# Patient Record
Sex: FEMALE | Race: WHITE | Employment: UNEMPLOYED | URBAN - METROPOLITAN AREA
[De-identification: names, ages, dates, MRNs, and addresses within clinical notes are randomized per-mention and may not be internally consistent; named-entity substitution may affect disease eponyms.]

---

## 2017-08-04 ENCOUNTER — TRANSFERRED RECORDS (OUTPATIENT)
Dept: HEALTH INFORMATION MANAGEMENT | Facility: CLINIC | Age: 37
End: 2017-08-04

## 2017-08-06 ENCOUNTER — TRANSFERRED RECORDS (OUTPATIENT)
Dept: HEALTH INFORMATION MANAGEMENT | Facility: CLINIC | Age: 37
End: 2017-08-06

## 2017-08-10 ENCOUNTER — TRANSFERRED RECORDS (OUTPATIENT)
Dept: HEALTH INFORMATION MANAGEMENT | Facility: CLINIC | Age: 37
End: 2017-08-10

## 2017-08-12 ENCOUNTER — TRANSFERRED RECORDS (OUTPATIENT)
Dept: HEALTH INFORMATION MANAGEMENT | Facility: CLINIC | Age: 37
End: 2017-08-12

## 2017-08-16 ENCOUNTER — TRANSFERRED RECORDS (OUTPATIENT)
Dept: HEALTH INFORMATION MANAGEMENT | Facility: CLINIC | Age: 37
End: 2017-08-16

## 2017-09-22 ENCOUNTER — TRANSFERRED RECORDS (OUTPATIENT)
Dept: HEALTH INFORMATION MANAGEMENT | Facility: CLINIC | Age: 37
End: 2017-09-22

## 2017-11-07 ENCOUNTER — CARE COORDINATION (OUTPATIENT)
Dept: SURGERY | Facility: CLINIC | Age: 37
End: 2017-11-07

## 2017-11-07 NOTE — PROGRESS NOTES
Called and left message for patient. We do not have any records, we should reschedule her appointment.     Writer did call:     Dr. Zoraida Medina MD  Doctor in [place], [place]  Address: [address], [place] [address]  Phone: [phone]    They are sending records today however they cannot send images electronically over to us. They have to mail a CD of the images.     If patient would like to hand carry her records, she can also do this. We need to have her call back to discuss.

## 2017-11-08 ENCOUNTER — OFFICE VISIT (OUTPATIENT)
Dept: SURGERY | Facility: CLINIC | Age: 37
End: 2017-11-08

## 2017-11-08 VITALS
DIASTOLIC BLOOD PRESSURE: 79 MMHG | WEIGHT: 289.5 LBS | OXYGEN SATURATION: 97 % | TEMPERATURE: 98.5 F | SYSTOLIC BLOOD PRESSURE: 134 MMHG | HEIGHT: 62 IN | BODY MASS INDEX: 53.27 KG/M2 | HEART RATE: 76 BPM

## 2017-11-08 DIAGNOSIS — K43.2 INCISIONAL HERNIA, WITHOUT OBSTRUCTION OR GANGRENE: Primary | ICD-10-CM

## 2017-11-08 RX ORDER — UBIDECARENONE 100 MG
3000 CAPSULE ORAL EVERY MORNING
COMMUNITY
Start: 2016-10-10

## 2017-11-08 RX ORDER — FLUOXETINE 40 MG/1
40 CAPSULE ORAL EVERY MORNING
COMMUNITY
Start: 2017-10-10 | End: 2020-05-19

## 2017-11-08 RX ORDER — ACETAMINOPHEN 325 MG/1
650 TABLET ORAL
COMMUNITY
Start: 2017-08-22 | End: 2018-07-10

## 2017-11-08 RX ORDER — CHOLECALCIFEROL (VITAMIN D3) 50 MCG
TABLET ORAL
COMMUNITY
Start: 2016-10-10 | End: 2018-07-10

## 2017-11-08 RX ORDER — HYDROXYZINE HYDROCHLORIDE 50 MG/1
50-100 TABLET, FILM COATED ORAL PRN
COMMUNITY

## 2017-11-08 RX ORDER — LEVOTHYROXINE SODIUM 75 UG/1
75 TABLET ORAL EVERY MORNING
COMMUNITY
Start: 2017-10-19

## 2017-11-08 ASSESSMENT — PAIN SCALES - GENERAL: PAINLEVEL: SEVERE PAIN (6)

## 2017-11-08 NOTE — NURSING NOTE
"Chief Complaint   Patient presents with     Consult     Abdominal wall hernia, appt per Cherelle. Dr. Doss referring. BMI 53 CT scan from [facility]       Vitals:    11/08/17 1104   BP: 134/79   Pulse: 76   Temp: 98.5  F (36.9  C)   TempSrc: Oral   SpO2: 97%   Weight: 289 lb 8 oz   Height: 5' 2.21\"       Body mass index is 52.6 kg/(m^2).    Bee Manzano CMA                       "

## 2017-11-08 NOTE — PROGRESS NOTES
"MIS    RE: Brianna Brewer  MR#: [id number]  : 1980      Referring provider: Dr. Doss    Chief Complaint/Reason for visit: Abdominal ventral wall hernia      HISTORY OF PRESENT ILLNESS:  Brianna is a 35 yo female with a hx of obesity (BMI 53), hypothyroidism, and anxiety who presents with abdominal ventral wall hernia after caesarian section (Aug 6). Her  has been complicated by wound dehiscence, recurrent c.diff, hematochezia, and now ventral wall hernia that was diagnosed on CT.     She reports persistent LLQ abdominal pain that is a 5-6 out of 10 at baseline. At its worst, the pain is a 10 out of 10. The pain feels like stabbing and is worst during activity that increase her abdominal pressure such as coughing and holding her baby. She says that she can feel something \"sliding in an out.\" This pain has caused her to have decreased appetite and interrupted sleep. She has intermittent nausea and vomiting. No longer endorses hematochezia. Soft stools, not watery. She was taking oxycodone for her pain but ran out of the medication. Now, she takes 2 Aleve pills a day which provide some, but not complete relief.     Additionally, she complains of RUQ pain that started during 7 mos of pregnancy and has become more persistent for the past few weeks. This also feels like a stabbing with radiation to her right back. She reports that the pain may correlate with eating but she is unsure.     PAST MEDICAL HISTORY:  Obesity BMI 53  Hypothyroidism  Anxiety    PAST SURGICAL HISTORY:   (Aug 6, 2017)    FAMILY HISTORY:   Obesity on both sides of family    SOCIAL HISTORY:   From [place], [place]    ROS:  10 point ROS negative except for what is mentioned in HPI.    MEDICATIONS:  Current Outpatient Prescriptions   Medication     Cholecalciferol (D3-1000) 1000 UNITS CAPS     FLUoxetine (PROZAC) 40 MG capsule     levothyroxine (SYNTHROID/LEVOTHROID) 75 MCG tablet     Prenatal MV-Min-Fe Fum-FA-DHA " "(PRENATAL MULTIVITAMIN + DHA) 28-0.8 & 200 MG MISC     acetaminophen (TYLENOL) 325 MG tablet     hydrOXYzine (ATARAX) 50 MG tablet     No current facility-administered medications for this visit.        ALLERGIES: No known allergies    LABS/IMAGING/MEDICAL RECORDS REVIEW:   CT Abdomen from OS ([facility]) - 10/26/17  Large ventral abdominal hernia similar to the 10/02/2017 exam. No interval acute inflammatory change within the abdomen or pelvis. No bowel obstruction.      CT Abdomen from OS ([facility]) - 10/2/17  Infraumbilical ventral herniation without evidence of incarceration. There are however inflammatory changes about the hernia sac extending caudally with potential fistula to the skin.    PHYSICAL EXAM:  /79  Pulse 76  Temp 98.5  F (36.9  C) (Oral)  Ht 1.58 m (5' 2.21\")  Wt 131.3 kg (289 lb 8 oz)  SpO2 97%  BMI 52.6 kg/m2    General: Alert and oriented, NAD  HEENT: no jaundice, no scleral icterus  CV: regular rate and rhythm  Resp: CTAB  Abdomen: soft, obese, mildly tender to palpation chary in LLQ, no masses felt, RUQ tenderness +Pillai's  Extrem: warm, no edema or obvious joint deformities    Assessment & Plan:  Brianna is a 35 yo female with a hx of obesity (BMI 53), hypothyroidism, and recent C. Diff who presents with abdominal ventral wall hernia after caesarian section (Aug 6).    - Recommend 50 lb weight loss prior to surgical intervention. Will schedule appt with [facility] NPs to discuss pharmaceutical weight loss options  - F/u in MIS clinic in 1 month  - Meet with plastic surgery outpt in 1 month    BONIFACIO De Leon      Patient seen and examined by me. The above noted by the medical student much appreciated and reflects her capacity as described in my seeing this patient. The patient understands the need for significant weight loss and we will have her see plastic surgery as we approached this from a multidisciplinary perspective. The patient understands signs symptoms of " incarceration and strangulation and will see us if necessary earlier. I will see her in 1 month.

## 2017-11-08 NOTE — MR AVS SNAPSHOT
After Visit Summary   11/8/2017    Brianna Brewer    MRN: [id number]           Patient Information     Date Of Birth          1980        Visit Information        Provider Department      11/8/2017 12:00 PM Gwyn Finch MD [facility] Surgery        Care Instructions    It was a pleasure meeting with you today.     Thank you for allowing us the privilege of caring for you. We hope we provided you with the excellent service you deserve.     Please let us know if there is anything else we can do for you so that we can be sure you are leaving completely satisfied with your care experience.      You saw Dr Finch today.     Instructions per today's visit:     Plan to lose 50 lbs from today's weight of 289 lbs. Goal weight is 249 lbs.     This may help with your symptoms.     Please take Ibuprofen for pain. Take stool softeners to help with constipation and discomfort.    Please call to set up with Medical weight management team to assist with weight loss with medications: [phone]- please ask for non-surgical weight management.     Please see the Plastic surgery team to discuss pulling your abdominal wall.    Please see both the plastic surgery team and Dr. Finch on the same day.       **If you have emergent symptoms, please call the hospital at [phone] and ask for the on-call surgeon, or present to the hospital. **    To schedule appointments with our team, please call [phone] option #1    Please call during clinic hours Monday through Friday 8:00a - 4:00p if you have questions or you can contact us via X[employer]ilon at anytime.      Nurses: [phone] Option # 3 for nurse advice line.  Fax: [phone]  Surgery Scheduler: [phone]    Please call the hospital at [phone] to speak with our on call MDs if you have urgent needs after hours, during weekends, or holidays.              Follow-ups after your visit        Who to contact     Please call your  "clinic at [phone] to:    Ask questions about your health    Make or cancel appointments    Discuss your medicines    Learn about your test results    Speak to your doctor   If you have compliments or concerns about an experience at your clinic, or if you wish to file a complaint, please contact [facility] Patient Relations at [phone] or email us at [email or web address]         Additional Information About Your Visit        MyChart Information     [employer] gives you secure access to your electronic health record. If you see a primary care provider, you can also send messages to your care team and make appointments. If you have questions, please call your primary care clinic.  If you do not have a primary care provider, please call [phone] and they will assist you.      Talkbits is an electronic gateway that provides easy, online access to your medical records. With [employer]bits, you can request a clinic appointment, read your test results, renew a prescription or communicate with your care team.     To access your existing account, please contact your [facility] Clinic or call [phone] for assistance.        Care EveryWhere ID     This is your Care EveryWhere ID. This could be used by other organizations to access your [facility] medical records  [id number]        Your Vitals Were     Pulse Temperature Height Pulse Oximetry BMI (Body Mass Index)       76 98.5  F (36.9  C) (Oral) 1.58 m (5' 2.21\") 97% 52.6 kg/m2        Blood Pressure from Last 3 Encounters:   11/08/17 134/79    Weight from Last 3 Encounters:   11/08/17 131.3 kg (289 lb 8 oz)              Today, you had the following     No orders found for display       Primary Care Provider Office Phone # Fax #    Yaya Kramer [phone]       [address] [address]        Equal Access to Services     JANICE SRIVASTAVA: Nabila watts " leonila Zuñiga, julian colindresangelicaha, kwabena jcnicholas hernestoalesha, cydney mirthain hayaan hernestoaidan shreyateresita laskyeludmila aidee. So [facility] [phone].    ATENCIÓN: Si habla español, tiene a parish disposición servicios gratuitos de asistencia lingüística. Des al [phone].    We comply with applicable federal civil rights laws and [place] laws. We do not discriminate on the basis of race, color, national origin, age, disability, sex, sexual orientation, or gender identity.            Thank you!     Thank you for choosing [facility]  for your care. Our goal is always to provide you with excellent care. Hearing back from our patients is one way we can continue to improve our services. Please take a few minutes to complete the written survey that you may receive in the mail after your visit with us. Thank you!             Your Updated Medication List - Protect others around you: Learn how to safely use, store and throw away your medicines at www.disposemymeds.org.          This list is accurate as of: 11/8/17  1:12 PM.  Always use your most recent med list.                   Brand Name Dispense Instructions for use Diagnosis    acetaminophen 325 MG tablet    TYLENOL     Take 650 mg by mouth        D3-1000 1000 UNITS Caps           FLUoxetine 40 MG capsule    PROzac          hydrOXYzine 50 MG tablet    ATARAX     Take  mg by mouth        levothyroxine 75 MCG tablet    SYNTHROID/LEVOTHROID          PRENATAL MULTIVITAMIN + DHA 28-0.8 & 200 MG Misc

## 2017-11-08 NOTE — LETTER
"2017       RE: Brianna Brewer  [address] [place] [id number]     Dear Colleague,    Thank you for referring your patient, Brianna Brewer, to [facility] Please see a copy of my visit note below.    MIS    RE: Brianna Brewer  MR#: [id number]  : 1980      Referring provider: Dr. Doss    Chief Complaint/Reason for visit: Abdominal ventral wall hernia      HISTORY OF PRESENT ILLNESS:  Brianna is a 37 yo female with a hx of obesity (BMI 53), hypothyroidism, and anxiety who presents with abdominal ventral wall hernia after caesarian section (Aug 6). Her  has been complicated by wound dehiscence, recurrent c.diff, hematochezia, and now ventral wall hernia that was diagnosed on CT.     She reports persistent LLQ abdominal pain that is a 5-6 out of 10 at baseline. At its worst, the pain is a 10 out of 10. The pain feels like stabbing and is worst during activity that increase her abdominal pressure such as coughing and holding her baby. She says that she can feel something \"sliding in an out.\" This pain has caused her to have decreased appetite and interrupted sleep. She has intermittent nausea and vomiting. No longer endorses hematochezia. Soft stools, not watery. She was taking oxycodone for her pain but ran out of the medication. Now, she takes 2 Aleve pills a day which provide some, but not complete relief.     Additionally, she complains of RUQ pain that started during 7 mos of pregnancy and has become more persistent for the past few weeks. This also feels like a stabbing with radiation to her right back. She reports that the pain may correlate with eating but she is unsure.     PAST MEDICAL HISTORY:  Obesity BMI 53  Hypothyroidism  Anxiety    PAST SURGICAL HISTORY:   (Aug 6, 2017)    FAMILY HISTORY:   Obesity on both sides of family    SOCIAL HISTORY:   From [place], [place]    ROS:  10 " "point ROS negative except for what is mentioned in HPI.    MEDICATIONS:  Current Outpatient Prescriptions   Medication     Cholecalciferol (D3-1000) 1000 UNITS CAPS     FLUoxetine (PROZAC) 40 MG capsule     levothyroxine (SYNTHROID/LEVOTHROID) 75 MCG tablet     Prenatal MV-Min-Fe Fum-FA-DHA (PRENATAL MULTIVITAMIN + DHA) 28-0.8 & 200 MG MISC     acetaminophen (TYLENOL) 325 MG tablet     hydrOXYzine (ATARAX) 50 MG tablet     No current facility-administered medications for this visit.        ALLERGIES: No known allergies    LABS/IMAGING/MEDICAL RECORDS REVIEW:   CT Abdomen from OS ([facility]) - 10/26/17  Large ventral abdominal hernia similar to the 10/02/2017 exam. No interval acute inflammatory change within the abdomen or pelvis. No bowel obstruction.      CT Abdomen from OS ([facility]) - 10/2/17  Infraumbilical ventral herniation without evidence of incarceration. There are however inflammatory changes about the hernia sac extending caudally with potential fistula to the skin.    PHYSICAL EXAM:  /79  Pulse 76  Temp 98.5  F (36.9  C) (Oral)  Ht 1.58 m (5' 2.21\")  Wt 131.3 kg (289 lb 8 oz)  SpO2 97%  BMI 52.6 kg/m2    General: Alert and oriented, NAD  HEENT: no jaundice, no scleral icterus  CV: regular rate and rhythm  Resp: CTAB  Abdomen: soft, obese, mildly tender to palpation chary in LLQ, no masses felt, RUQ tenderness +Pillai's  Extrem: warm, no edema or obvious joint deformities    Assessment & Plan:  Brianna is a 37 yo female with a hx of obesity (BMI 53), hypothyroidism, and recent C. Diff who presents with abdominal ventral wall hernia after caesarian section (Aug 6).    - Recommend 50 lb weight loss prior to surgical intervention. Will schedule appt with [facility] NPs to discuss pharmaceutical weight loss options  - F/u in MIS clinic in 1 month  - Meet with plastic surgery outpt in 1 month    Mi Gant MS3      Patient seen and examined by me. The above noted by the medical student " much appreciated and reflects her capacity as described in my seeing this patient. The patient understands the need for significant weight loss and we will have her see plastic surgery as we approached this from a multidisciplinary perspective. The patient understands signs symptoms of incarceration and strangulation and will see us if necessary earlier. I will see her in 1 month.      Again, thank you for allowing me to participate in the care of your patient.      Sincerely,    Gwyn Finch MD

## 2017-11-09 ASSESSMENT — ENCOUNTER SYMPTOMS
SEIZURES: 0
HEARTBURN: 1
CHILLS: 1
LOSS OF CONSCIOUSNESS: 0
TREMORS: 0
DIARRHEA: 1
NERVOUS/ANXIOUS: 1
ALTERED TEMPERATURE REGULATION: 1
SWOLLEN GLANDS: 0
SPEECH CHANGE: 0
DOUBLE VISION: 0
DIZZINESS: 1
POLYDIPSIA: 1
INSOMNIA: 1
POLYPHAGIA: 1
WEIGHT GAIN: 1
NAUSEA: 1
DEPRESSION: 1
EYE IRRITATION: 0
SKIN CHANGES: 0
NAIL CHANGES: 1
BLOATING: 1
BOWEL INCONTINENCE: 1
RECTAL PAIN: 1
TINGLING: 1
MEMORY LOSS: 0
EYE WATERING: 0
POOR WOUND HEALING: 0
CONSTIPATION: 1
NUMBNESS: 1
FATIGUE: 1
INCREASED ENERGY: 1
ABDOMINAL PAIN: 1
WEAKNESS: 1
EYE PAIN: 0
HEADACHES: 1
FEVER: 0
DECREASED APPETITE: 0
PARALYSIS: 0
DECREASED CONCENTRATION: 1
EYE REDNESS: 0
WEIGHT LOSS: 0
BRUISES/BLEEDS EASILY: 0
HALLUCINATIONS: 0
DISTURBANCES IN COORDINATION: 0
JAUNDICE: 0
BLOOD IN STOOL: 1
VOMITING: 1
NIGHT SWEATS: 1
PANIC: 1

## 2017-11-17 ENCOUNTER — ALLIED HEALTH/NURSE VISIT (OUTPATIENT)
Dept: SURGERY | Facility: CLINIC | Age: 37
End: 2017-11-17

## 2017-11-17 ENCOUNTER — OFFICE VISIT (OUTPATIENT)
Dept: ENDOCRINOLOGY | Facility: CLINIC | Age: 37
End: 2017-11-17

## 2017-11-17 VITALS
WEIGHT: 290.1 LBS | OXYGEN SATURATION: 98 % | BODY MASS INDEX: 53.39 KG/M2 | DIASTOLIC BLOOD PRESSURE: 77 MMHG | HEART RATE: 80 BPM | HEIGHT: 62 IN | SYSTOLIC BLOOD PRESSURE: 120 MMHG

## 2017-11-17 DIAGNOSIS — E66.01 MORBID OBESITY (H): Primary | ICD-10-CM

## 2017-11-17 RX ORDER — TOPIRAMATE 25 MG/1
TABLET, FILM COATED ORAL
Qty: 90 TABLET | Refills: 1 | Status: SHIPPED | OUTPATIENT
Start: 2017-11-17 | End: 2018-01-19

## 2017-11-17 RX ORDER — TOPIRAMATE 25 MG/1
TABLET, FILM COATED ORAL
Qty: 90 TABLET | Refills: 3 | Status: SHIPPED | OUTPATIENT
Start: 2017-11-17 | End: 2018-01-19
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
## 2017-11-17 NOTE — PROGRESS NOTES
"Brianna RebeccaAnetaKenny Brewer is a 36 year old female presents today for new weight management nutrition consultation.  Patient referred by Dr Huang(11/17/17).    Estimated body mass index is 52.7 kg/(m^2) as calculated from the following:    Height as of an earlier encounter on 11/17/17: 1.58 m (5' 2.21\").    Weight as of an earlier encounter on 11/17/17: 131.6 kg (290 lb 1.6 oz).     Goal to loose 50 pounds for surgery (goal 240 pounds)    Nutrition history  See MD note for details.  Weight watchers, atkins    B: Breakfast sandwich at [employer], cheese and pretzels  Chips or crackers  L: snacking throughout the day  D 5-6: mac and cheese, frozen pizza, chicken, cheese tacos, cereal(1% milk)  Popcorn or chips  Beverages: pop 8 cans per day on average or nestea, water, occ milk, rarely juice  Dislikes fruit and vegetables    Nutrition Prescription  Volumetric diet (per MD)    Nutrition Diagnosis  Food and nutrition related knowledge deficit r/t lack of prior exposure to volumetric diet and nutrition education aeb pt unable to verbalize understanding of volumetric diet for weight loss.    Nutrition Intervention  Materials/education provided on Volumetric eating to help satiety level on fewer calories; portion control and healthy food choices (Volumetrics handouts), 100 calorie snack choices, sources of protein, meal and snack planning and websites, sample meal plans    Patient Understanding: good  Expected Compliance: good  Follow-Up Plans: Physical activity     Nutrition Goals  1) Cut out pop, cut back by half each week  2) Add protein at three meals  3) Three full meals, reduce snacking during the day  4) Weight loss    Follow-Up:  PRN    Time spent with patient: 45 minutes.  Bernie Dalton, RD, LD        "

## 2017-11-17 NOTE — MR AVS SNAPSHOT
MRN:[id number]                      After Visit Summary   11/17/2017    Brianna Brewer    MRN: [id number]           Visit Information        Provider Department      11/17/2017 2:00 PM Bernie Dalton RD [facility]        Your next 10 appointments already scheduled     Nov 17, 2017  2:00 PM CST   (Arrive by 1:45 PM)   NUTRITION VISIT with Bernie Dalton RD   [facility] ([facility])    [address] [place] [id number] [phone]            Dec 13, 2017 10:00 AM CST   (Arrive by 9:45 AM)   New Plastic Surgery with MAGDIEL Katz MD   [facility] ([facility])    [address] [place] [id number] [phone]           Do not wear perfume.            Dec 13, 2017 11:00 AM CST   (Arrive by 10:45 AM)   NEW HERNIA SURGERY with Gwyn Finch MD   [facility] General Surgery ([facility])    [address] [place] [id number] [phone]           Do not wear perfume.              Care Instructions    Nutrition Goals  1) Cut out pop, cut back by half each week  2) Add protein at three meals  3) Three full meals, reduce snacking during the day  4) Weight loss    Bernie Dalton RD,     If you need to schedule or reschedule with a dietitian please call [phone].           [employer] Information     [employer] gives you secure access to your electronic health record. If you see a primary care provider, you can also send messages to your care team and make appointments. If you have questions, please call your primary care clinic.  If you do not have a primary care provider, please call [phone] and they will assist you.      [employer] is an electronic gateway that provides easy, online access to your medical records. With [employer], you can request a  clinic appointment, read your test results, renew a prescription or communicate with your care team.     To access your existing account, please contact your [facility] or call [phone] for assistance.        Care EveryWhere ID     This is your Care EveryWhere ID. This could be used by other organizations to access your [facility] medical records  [id number]        Equal Access to Services     JANICE HARDIN : Nabila Zuñiga, julian crockett, cydney tinsley. So [facility] [phone].    ATENCIÓN: Si habla español, tiene a parish disposición servicios gratuitos de asistencia lingüística. Llame al [phone].    We comply with applicable federal civil rights laws and [place] laws. We do not discriminate on the basis of race, color, national origin, age, disability, sex, sexual orientation, or gender identity.
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
## 2017-11-17 NOTE — NURSING NOTE
"Chief Complaint   Patient presents with     Weight Problem     NMWM       Vitals:    11/17/17 1123   BP: 120/77   Pulse: 80   SpO2: 98%   Weight: 290 lb 1.6 oz   Height: 5' 2.21\"       Body mass index is 52.7 kg/(m^2).    Bee Manzano CMA                       "

## 2017-11-17 NOTE — PATIENT INSTRUCTIONS
Dietitian visit for education.  Return in about 2-3 months with Dr. Huang or with Renetta Rossi      EDICATION STARTED AT THIS APPOINTMENT  We are starting topiramate at bedtime.  Start one tab, 25 mg, for a week. Go up to 50 mg (2 tabs) for the next week. At the third week, take   3 tabs (75 mg).  Stay at 3 tabs until you are seen again. Call the nurse at [phone] if you have any questions or concerns. (Do not stop taking it if you don't think it's working. For some people it works even though they do not feel much different.)    Topiramate (Topamax) is a medication that is used most often to treat migraine headaches or for seizures. It has also been found to help with weight loss. Although it's not currently FDA approved for weight loss, it has been used safely for a number of years to help people who are carrying extra weight.     Just how topiramate helps with weight loss has not been exactly determined. However it seems to work on areas of the brain to quiet down signals related to eating.      Topiramate may make you:    >feel less interest in eating in between meals   >think less about food and eating   >find it easier to push the plate away   >find giving up pop easier    >have an easier time eating less    For some of our patients, the pills work right away. They feel and think quite differently about food. Other patients don't feel much of a change but find in fact they have lost weight! Like all weight loss medications, topiramate works best when you help it work.  This means:    1) Have less tempting high calorie (fattening) food around the house or office    2) Have lower calorie food (fruits, vegetables,low fat meats and dairy) for snacks    3) Eat out only one time or less each week.   4) Eat your meals at a table with the TV or computer off.    Side-effects. Topiramate is generally well tolerated. The main side-effects we see are:   Tingling in hands,feet, or face (usually not very  troublesome)   Mental confusion and word finding trouble (about 10% of patients have this.)     Feeling sleepy or a bit dopey- this goes away very soon after starting.    One of the dangers of topiramate is the possibility of birth defects--if you get pregnant when you are on it, there is the risk that your baby will be born with a cleft lip or palate.  If you are on topiramate and of child bearing age, you need to be on a reliable form of birth control or refrain from sexual intercourse.     Please refer to the pharmacy insert for more information on side-effects. Since many pharmacists are not familiar with the use of topiramate in weight loss, calling the clinic will get you the most accurate information on the use of this medication for weight loss.     In order to get refills of this or any medication we prescribe you must be seen in the medical weight mgmt clinic every 2-3 months. Please have your pharmacy fax a refill request to [phone].

## 2017-11-17 NOTE — MR AVS SNAPSHOT
After Visit Summary   11/17/2017    Brianna Brewer    MRN: [id number]           Patient Information     Date Of Birth          1980        Visit Information        Provider Department      11/17/2017 12:00 PM Eloina Huang MD [facility] Medical Weight Management        Today's Diagnoses     Morbid obesity (H)    -  1      Care Instructions    Dietitian visit for education.  Return in about 2-3 months with Dr. Huang or with Renetta Rossi      EDICATION STARTED AT THIS APPOINTMENT  We are starting topiramate at bedtime.  Start one tab, 25 mg, for a week. Go up to 50 mg (2 tabs) for the next week. At the third week, take   3 tabs (75 mg).  Stay at 3 tabs until you are seen again. Call the nurse at [phone] if you have any questions or concerns. (Do not stop taking it if you don't think it's working. For some people it works even though they do not feel much different.)    Topiramate (Topamax) is a medication that is used most often to treat migraine headaches or for seizures. It has also been found to help with weight loss. Although it's not currently FDA approved for weight loss, it has been used safely for a number of years to help people who are carrying extra weight.     Just how topiramate helps with weight loss has not been exactly determined. However it seems to work on areas of the brain to quiet down signals related to eating.      Topiramate may make you:    >feel less interest in eating in between meals   >think less about food and eating   >find it easier to push the plate away   >find giving up pop easier    >have an easier time eating less    For some of our patients, the pills work right away. They feel and think quite differently about food. Other patients don't feel much of a change but find in fact they have lost weight! Like all weight loss medications, topiramate works best when you help it work.  This means:    1) Have less tempting high calorie (fattening)  food around the house or office    2) Have lower calorie food (fruits, vegetables,low fat meats and dairy) for snacks    3) Eat out only one time or less each week.   4) Eat your meals at a table with the TV or computer off.    Side-effects. Topiramate is generally well tolerated. The main side-effects we see are:   Tingling in hands,feet, or face (usually not very troublesome)   Mental confusion and word finding trouble (about 10% of patients have this.)     Feeling sleepy or a bit dopey- this goes away very soon after starting.    One of the dangers of topiramate is the possibility of birth defects--if you get pregnant when you are on it, there is the risk that your baby will be born with a cleft lip or palate.  If you are on topiramate and of child bearing age, you need to be on a reliable form of birth control or refrain from sexual intercourse.     Please refer to the pharmacy insert for more information on side-effects. Since many pharmacists are not familiar with the use of topiramate in weight loss, calling the clinic will get you the most accurate information on the use of this medication for weight loss.     In order to get refills of this or any medication we prescribe you must be seen in the medical weight mgmt clinic every 2-3 months. Please have your pharmacy fax a refill request to [phone].                  Follow-ups after your visit        Follow-up notes from your care team     Return in about 2 months (around 1/17/2018).      Your next 10 appointments already scheduled     Nov 17, 2017  2:00 PM CST   (Arrive by 1:45 PM)   NUTRITION VISIT with Bernie Dalton RD   [facility] Surgical Weight Management ([facility])    [address] [place] [id number]   [phone]            Dec 13, 2017 10:00 AM CST   (Arrive by 9:45 AM)   New Plastic Surgery with MAGDIEL Katz MD   [facility] Plastic and Reconstructive Surgery ([facility])    [address] [facility] [id number] [phone]           Do not wear perfume.            Dec 13, 2017 11:00 AM CST   (Arrive by 10:45 AM)   NEW HERNIA SURGERY with Gwyn Finch MD   [facility] General Surgery ([facility])    [address]  [address] [facility] [id number] [phone]           Do not wear perfume.              Who to contact     Please call your clinic at [phone] to:    Ask questions about your health    Make or cancel appointments    Discuss your medicines    Learn about your test results    Speak to your doctor   If you have compliments or concerns about an experience at your clinic, or if you wish to file a complaint, please contact [facility] Patient Relations at [phone] or email us at [email or web address]         Additional Information About Your Visit        [email or web address] Information     [employer] gives you secure access to your electronic health record. If you see a primary care provider, you can also send messages to your care team and make appointments. If you have questions, please call your primary care clinic.  If you do not have a primary care provider, please call [phone] and they will assist you.      [employer] is an electronic gateway that provides easy, online access to your medical records. With [employer], you can request a clinic appointment, read your test results, renew a prescription or communicate with your care team.     To access your existing account, please contact your [facility] Clinic or call [phone] for assistance.        Care EveryWhere ID     This is your Care EveryWhere ID. This could be used by other organizations to access your [place] medical records  [id number]        Your Vitals Were     Pulse Height Pulse Oximetry BMI (Body Mass Index)          80 1.58 m (5' 2.21\") 98% 52.7 kg/m2         Blood " Pressure from Last 3 Encounters:   11/17/17 120/77   11/08/17 134/79    Weight from Last 3 Encounters:   11/17/17 131.6 kg (290 lb 1.6 oz)   11/08/17 131.3 kg (289 lb 8 oz)              Today, you had the following     No orders found for display         Today's Medication Changes          These changes are accurate as of: 11/17/17  1:09 PM.  If you have any questions, ask your nurse or doctor.               Start taking these medicines.        Dose/Directions    topiramate 25 MG tablet   Commonly known as:  TOPAMAX   Used for:  Morbid obesity (H)   Started by:  Eloina Huang MD        25mg at bedtime for week 1, 50mg at bedtime for 1 week, and 75mg at bedtime thereafter   Quantity:  90 tablet   Refills:  1            Where to get your medicines      These medications were sent to [id number] - [place], [place] - [address]  [address] [facility], [place], [facility] [address]     Phone:  [phone]     topiramate 25 MG tablet                Primary Care Provider Office Phone # Fax #    Yaya PAYNE [place] [phone]       [address] [address]        Equal Access to Services     JANICE HARDIN AH: Hadii jazmine watts hadasho Soomaali, waaxda luqadaha, qaybta kaalmada adeegyada, cydney hoskins. So [facility] [phone].    ATENCIÓN: Si habla español, tiene a parish disposición servicios gratuitos de asistencia lingüística. Llame al [phone].    We comply with applicable federal civil rights laws and [place] laws. We do not discriminate on the basis of race, color, national origin, age, disability, sex, sexual orientation, or gender identity.            Thank you!     Thank you for choosing [facility] WEIGHT MANAGEMENT  for your care. Our goal is always to provide you with excellent care. Hearing back from our patients is one way we can continue to improve our services. Please take a few minutes to complete the written survey that  you may receive in the mail after your visit with us. Thank you!             Your Updated Medication List - Protect others around you: Learn how to safely use, store and throw away your medicines at www.disposemymeds.org.          This list is accurate as of: 11/17/17  1:09 PM.  Always use your most recent med list.                   Brand Name Dispense Instructions for use Diagnosis    acetaminophen 325 MG tablet    TYLENOL     Take 650 mg by mouth        D3-1000 1000 UNITS Caps           FLUoxetine 40 MG capsule    PROzac          hydrOXYzine 50 MG tablet    ATARAX     Take  mg by mouth        levothyroxine 75 MCG tablet    SYNTHROID/LEVOTHROID          PRENATAL MULTIVITAMIN + DHA 28-0.8 & 200 MG Misc           topiramate 25 MG tablet    TOPAMAX    90 tablet    25mg at bedtime for week 1, 50mg at bedtime for 1 week, and 75mg at bedtime thereafter    Morbid obesity (H)

## 2017-11-17 NOTE — PATIENT INSTRUCTIONS
Nutrition Goals  1) Cut out pop, cut back by half each week  2) Add protein at three meals  3) Three full meals, reduce snacking during the day  4) Weight loss    Bernie Dalton, SREEKANTH, LD    If you need to schedule or reschedule with a dietitian please call [phone].

## 2017-12-13 ENCOUNTER — OFFICE VISIT (OUTPATIENT)
Dept: SURGERY | Facility: CLINIC | Age: 37
End: 2017-12-13
Payer: COMMERCIAL

## 2017-12-13 ENCOUNTER — OFFICE VISIT (OUTPATIENT)
Dept: PLASTIC SURGERY | Facility: CLINIC | Age: 37
End: 2017-12-13
Payer: COMMERCIAL

## 2017-12-13 VITALS
HEIGHT: 62 IN | TEMPERATURE: 97.7 F | SYSTOLIC BLOOD PRESSURE: 135 MMHG | DIASTOLIC BLOOD PRESSURE: 79 MMHG | HEART RATE: 97 BPM | OXYGEN SATURATION: 96 % | BODY MASS INDEX: 53.92 KG/M2 | WEIGHT: 293 LBS

## 2017-12-13 VITALS
HEIGHT: 62 IN | BODY MASS INDEX: 53.92 KG/M2 | TEMPERATURE: 97.7 F | OXYGEN SATURATION: 96 % | DIASTOLIC BLOOD PRESSURE: 79 MMHG | SYSTOLIC BLOOD PRESSURE: 135 MMHG | WEIGHT: 293 LBS | HEART RATE: 97 BPM

## 2017-12-13 DIAGNOSIS — K43.2 INCISIONAL HERNIA, WITHOUT OBSTRUCTION OR GANGRENE: Primary | ICD-10-CM

## 2017-12-13 DIAGNOSIS — K43.9 VENTRAL HERNIA WITHOUT OBSTRUCTION OR GANGRENE: Primary | ICD-10-CM

## 2017-12-13 ASSESSMENT — PAIN SCALES - GENERAL: PAINLEVEL: SEVERE PAIN (6)

## 2017-12-13 NOTE — LETTER
2017       RE: Brianna Brewer  [address] [place] [id number]     Dear Colleague,    Thank you for referring your patient, Brianna Brewer, to the [facility] PLASTIC AND RECONSTRUCTIVE SURGERY at [facility]. Please see a copy of my visit note below.    REFERRING PROVIDER:  Gwyn Finch MD, [facility]      PRESENTING COMPLAINT:  Consultation for ventral hernia repair and abdominal wall reconstruction.      HISTORY OF PRESENTING COMPLAINT:  Ms. Brewer is 37 years old.  She underwent a  in 2017 and after that developed a wound infection and ultimately a hernia has developed in the  scar area.  She was seen by Dr. Finch who has recommended weight loss of about 50 pounds and then potential repair.  She is here to discuss options for that combined surgical repair in the future.      PAST MEDICAL HISTORY:  Hypothyroidism.      PAST SURGICAL HISTORY:  .      MEDICATIONS:     1. Topamax.   2. Prozac.   3. Synthroid.      ALLERGIES:  Nil.      SOCIAL HISTORY:  Does not currently smoke.  She used to smoke 1 pack a day for 20 years, quit 2 years ago.  Rarely drinks alcohol.  Works at a desk.      REVIEW OF SYSTEMS:  Denies chest pain, shortness of breath, MI, CVA, DVT and PE.      PHYSICAL EXAMINATION:  On exam vital signs stable.  She is afebrile in no obvious distress.  She is 5 feet 2-1/2 inches, 294 pounds with a BMI of 54 kg.  On examination of her abdomen, she has a rotund abdomen with a lower abdominal panniculus.  Difficult to assess the hernia given her size.  CAT scan shows a large ventral hernia in the lower abdomen with loss of domain.      ASSESSMENT AND PLAN:  Based on above findings, a diagnosis of a ventral hernia was made with a background of morbid obesity.  Had a long discussion with the patient about ventral hernias, their repair, abdominal wall reconstruction with or without component separation with  the help of buttressing with mesh.  Had a long conversation about the risk factors in her case being morbid obesity and the loss of domain and how important it is to lose weight.  Her target is 50 pounds.  Once she reaches that target, either through medical or surgical weight loss I will be happy to help out with coordinating abdominal wall repair.  I will see her back when the timing is right.  She understood the plan and agreed.  All questions were answered.  All exam and discussion done in the presence of my nurse.      Total time spent with patient 20 minutes, more than half was counseling.         MAGDIEL KATZ MD             D: 2017 19:11   T: 2017 20:24   MT: nh      Name:     TRISTIN ERVIN   MRN:      [id number]        Account:      [id number]   :      1980           Service Date: 2017      Document: [id number]       Again, thank you for allowing me to participate in the care of your patient.      Sincerely,    MAGDIEL Katz MD    cc:   Gwyn Finch MD   [facility]

## 2017-12-13 NOTE — PROGRESS NOTES
MIS    RE: Brianna Brewer  MR#: [id number]  : 1980  VISIT DATE: Dec 13, 2017      CHIEF COMPLAINT: Abdominal ventral pichardo hernia without obstruction or gangrene    HISTORY OF PRESENT ILLNESS:  Brianna is a 35 yo female with a hx of obesity (BMI 53), hypothyroidism, and anxiety who presents with abdominal ventral wall hernia after caesarian section (Aug 6). Her  has been complicated by wound dehiscence, recurrent c.diff, hematochezia, and now ventral wall hernia that was diagnosed on CT.     Since her last clinic visit in 17, the patient has not reported any changes in her symptoms. She continues to have 5/10 abdominal pain at baseline with intermittent episodes of 10/10 pain. She reports some nausea and has vomiting once a week. She is currently taking 2 pills of hydrocodone/day prescribed by her PCP for pain relief. She has had one day of hematochezia after taking a stool softener, but this has resolved after she stopped the medication.    Since starting Topiramate on , she has endorsed decreased appetite and cravings. Additionally, she has stopped drinking soda, increased her water intake, and made healthier dietary changes. However, she is frustrated because she has gained 5 pounds since her last visit.      Weight History:  Last clinic weight: 289 lb 8oz 17  Current Weight: Weight: 133.6 kg (294 lb 9.6 oz)    Medications:  Current Outpatient Prescriptions   Medication     topiramate (TOPAMAX) 25 MG tablet     topiramate (TOPAMAX) 25 MG tablet     Cholecalciferol (D3-1000) 1000 UNITS CAPS     FLUoxetine (PROZAC) 40 MG capsule     levothyroxine (SYNTHROID/LEVOTHROID) 75 MCG tablet     Prenatal MV-Min-Fe Fum-FA-DHA (PRENATAL MULTIVITAMIN + DHA) 28-0.8 & 200 MG MISC     acetaminophen (TYLENOL) 325 MG tablet     hydrOXYzine (ATARAX) 50 MG tablet     No current facility-administered medications for this visit.      ROS:  10 point ROS was negative except for what was mentioned  "in HPI    LABS/IMAGING/MEDICAL RECORDS REVIEW: none to review    PHYSICAL EXAMINATION:  /79  Pulse 97  Temp 97.7  F (36.5  C) (Oral)  Ht 1.58 m (5' 2.21\")  Wt 133.6 kg (294 lb 9.6 oz)  LMP 11/30/2017 (Approximate)  SpO2 96%  BMI 53.52 kg/m2   General: alert & oriented x3, NAD  HEENT: no jaundice, no scleral icterus, moist mucous membranes  Resp: breathing comfortably on room air  Abdomen: soft, obese  Extrem: warm, no edema or obvious joint abnormalities    ASSESSMENT AND PLAN:      1. Schedule to meet with Renetta for new bariatric surgery at next available opening  2. Stop taking opioids for pain control. Encourage NSAIDs 400mg 2-3x/day.   3. Continue to lose 30-50 lbs before ventral hernia repair surgery.    Mi Gant, MS3    Scribe Disclosure:   I, Mi Gant MS3, am serving as a scribe; to document services personally performed by Dr. Finch- -based on data collection and the provider's statements to me.     Provider Disclosure:  I agree with above History, Review of Systems, Physical exam and Plan.  I have reviewed the content of the documentation and have edited it as needed. I have personally performed the services documented here and the documentation accurately represents those services and the decisions I have made.      Electronically signed by:    Gwyn Finch    "

## 2017-12-13 NOTE — NURSING NOTE
"Chief Complaint   Patient presents with     Consult     abdominal hernia per Shelby       Vitals:    12/13/17 1035   BP: 135/79   BP Location: Left arm   Patient Position: Sitting   Cuff Size: Adult Large   Pulse: 97   Temp: 97.7  F (36.5  C)   TempSrc: Oral   SpO2: 96%   Weight: 294 lb 9.6 oz   Height: 5' 2.21\"       Body mass index is 53.52 kg/(m^2).      Darlyn Robertson                          "

## 2017-12-13 NOTE — MR AVS SNAPSHOT
After Visit Summary   12/13/2017    Brianna Brewer    MRN: [id number]           Patient Information     Date Of Birth          1980        Visit Information        Provider Department      12/13/2017 10:00 AM MAGDIEL Katz MD [facility] Plastic and Reconstructive Surgery        Today's Diagnoses     Ventral hernia without obstruction or gangrene    -  1       Follow-ups after your visit        Follow-up notes from your care team     Return if symptoms worsen or fail to improve.      Your next 10 appointments already scheduled     Jan 19, 2018  9:15 AM CST   (Arrive by 9:00 AM)   Return Weight Management Visit with VERONICA Pascal [facility] Medical Weight Management ([facility])    [address]  [address] [facility] [address] [phone]              Who to contact     Please call your clinic at [phone] to:    Ask questions about your health    Make or cancel appointments    Discuss your medicines    Learn about your test results    Speak to your doctor   If you have compliments or concerns about an experience at your clinic, or if you wish to file a complaint, please contact [facility] Patient Relations at [phone] or email us at [email or web address]         Additional Information About Your Visit        MyChart Information     [employer] gives you secure access to your electronic health record. If you see a primary care provider, you can also send messages to your care team and make appointments. If you have questions, please call your primary care clinic.  If you do not have a primary care provider, please call [phone] and they will assist you.      [employer] is an electronic gateway that provides easy, online access to your medical records. With [employer], you can request a clinic appointment, read your test results, renew a prescription or communicate with your care team.     To  "access your existing account, please contact your [facility] or call [phone] for assistance.        Care EveryWhere ID     This is your Care EveryWhere ID. This could be used by other organizations to access your [place] medical records  [id number]        Your Vitals Were     Pulse Temperature Height Last Period Pulse Oximetry BMI (Body Mass Index)    97 97.7  F (36.5  C) (Oral) 5' 2.21\" 11/30/2017 (Approximate) 96% 53.52 kg/m2       Blood Pressure from Last 3 Encounters:   12/13/17 135/79   12/13/17 135/79   11/17/17 120/77    Weight from Last 3 Encounters:   12/13/17 294 lb 9.6 oz   12/13/17 294 lb 9.6 oz   11/17/17 290 lb 1.6 oz              Today, you had the following     No orders found for display       Primary Care Provider Office Phone # Fax #    Yaya ELMER Kramer [phone]       [address] [address]        Equal Access to Services     [facility]: Hadii aad ku hadasho Soomaali, waaxda luqadaha, qaybta kaalmada adeegyada, waxay toi haydarnell han . So [facility] [phone].    ATENCIÓN: Si habla español, tiene a parish disposición servicios gratuitos de asistencia lingüística. [facility] [phone].    We comply with applicable federal civil rights laws and [place] laws. We do not discriminate on the basis of race, color, national origin, age, disability, sex, sexual orientation, or gender identity.            Thank you!     Thank you for choosing [facility] PLASTIC AND RECONSTRUCTIVE SURGERY  for your care. Our goal is always to provide you with excellent care. Hearing back from our patients is one way we can continue to improve our services. Please take a few minutes to complete the written survey that you may receive in the mail after your visit with us. Thank you!             Your Updated Medication List - Protect others around you: Learn how to safely use, store and throw away your medicines " at www.disposemymeds.org.          This list is accurate as of: 12/13/17 11:59 PM.  Always use your most recent med list.                   Brand Name Dispense Instructions for use Diagnosis    acetaminophen 325 MG tablet    TYLENOL     Take 650 mg by mouth        D3-1000 1000 UNITS Caps           FLUoxetine 40 MG capsule    PROzac          hydrOXYzine 50 MG tablet    ATARAX     Take  mg by mouth        levothyroxine 75 MCG tablet    SYNTHROID/LEVOTHROID          PRENATAL MULTIVITAMIN + DHA 28-0.8 & 200 MG Misc           * topiramate 25 MG tablet    TOPAMAX    90 tablet    25mg at bedtime for week 1, 50mg at bedtime for 1 week, and 75mg at bedtime thereafter    Morbid obesity (H)       * topiramate 25 MG tablet    TOPAMAX    90 tablet    25 mg at bedtime for 1 week, 50 mg at bedtime for 1 week and 75 mg daily at bedtime thereafter    Morbid obesity (H)       * Notice:  This list has 2 medication(s) that are the same as other medications prescribed for you. Read the directions carefully, and ask your doctor or other care provider to review them with you.

## 2017-12-13 NOTE — LETTER
2017       RE: Brianna Brewer  [address] [place] [address]     Dear Colleague,    Thank you for referring your patient, Brianna Brewer, to the [facility] GENERAL SURGERY at [facility]. Please see a copy of my visit note below.    MIS    RE: Brianna Brewer  MR#: [id number]  : 1980  VISIT DATE: Dec 13, 2017      CHIEF COMPLAINT: Abdominal ventral pichardo hernia without obstruction or gangrene    HISTORY OF PRESENT ILLNESS:  Brianna is a 37 yo female with a hx of obesity (BMI 53), hypothyroidism, and anxiety who presents with abdominal ventral wall hernia after caesarian section (Aug 6). Her  has been complicated by wound dehiscence, recurrent c.diff, hematochezia, and now ventral wall hernia that was diagnosed on CT.     Since her last clinic visit in 17, the patient has not reported any changes in her symptoms. She continues to have 5/10 abdominal pain at baseline with intermittent episodes of 10/10 pain. She reports some nausea and has vomiting once a week. She is currently taking 2 pills of hydrocodone/day prescribed by her PCP for pain relief. She has had one day of hematochezia after taking a stool softener, but this has resolved after she stopped the medication.    Since starting Topiramate on , she has endorsed decreased appetite and cravings. Additionally, she has stopped drinking soda, increased her water intake, and made healthier dietary changes. However, she is frustrated because she has gained 5 pounds since her last visit.      Weight History:  Last clinic weight: 289 lb 8oz 17  Current Weight: Weight: 133.6 kg (294 lb 9.6 oz)    Medications:  Current Outpatient Prescriptions   Medication     topiramate (TOPAMAX) 25 MG tablet     topiramate (TOPAMAX) 25 MG tablet     Cholecalciferol (D3-1000) 1000 UNITS CAPS     FLUoxetine (PROZAC) 40 MG capsule     levothyroxine (SYNTHROID/LEVOTHROID) 75 MCG tablet      "Prenatal MV-Min-Fe Fum-FA-DHA (PRENATAL MULTIVITAMIN + DHA) 28-0.8 & 200 MG MISC     acetaminophen (TYLENOL) 325 MG tablet     hydrOXYzine (ATARAX) 50 MG tablet     No current facility-administered medications for this visit.      ROS:  10 point ROS was negative except for what was mentioned in HPI    LABS/IMAGING/MEDICAL RECORDS REVIEW: none to review    PHYSICAL EXAMINATION:  /79  Pulse 97  Temp 97.7  F (36.5  C) (Oral)  Ht 1.58 m (5' 2.21\")  Wt 133.6 kg (294 lb 9.6 oz)  LMP 11/30/2017 (Approximate)  SpO2 96%  BMI 53.52 kg/m2   General: alert & oriented x3, NAD  HEENT: no jaundice, no scleral icterus, moist mucous membranes  Resp: breathing comfortably on room air  Abdomen: soft, obese  Extrem: warm, no edema or obvious joint abnormalities    ASSESSMENT AND PLAN:      1. Schedule to meet with Renetta for new bariatric surgery at next available opening  2. Stop taking opioids for pain control. Encourage NSAIDs 400mg 2-3x/day.   3. Continue to lose 30-50 lbs before ventral hernia repair surgery.    Mi Gant, MS3    Scribe Disclosure:   I, Mi Gant MS3, am serving as a scribe; to document services personally performed by Dr. Finch- -based on data collection and the provider's statements to me.     Provider Disclosure:  I agree with above History, Review of Systems, Physical exam and Plan.  I have reviewed the content of the documentation and have edited it as needed. I have personally performed the services documented here and the documentation accurately represents those services and the decisions I have made.        Again, thank you for allowing me to participate in the care of your patient.      Sincerely,    Gwyncorinna Finch MD      "

## 2017-12-13 NOTE — NURSING NOTE
"Chief Complaint   Patient presents with     RECHECK     Abdominal wall hernia, CT scan from [facility]. BMI 53       Vitals:    12/13/17 1042   BP: 135/79   Pulse: 97   Temp: 97.7  F (36.5  C)   TempSrc: Oral   SpO2: 96%   Weight: 294 lb 9.6 oz   Height: 5' 2.21\"       Body mass index is 53.52 kg/(m^2).    Patient medication list, drug allergies and history was reviewed today [facility].  Bee Manzano CMA                            "

## 2017-12-14 NOTE — PROGRESS NOTES
REFERRING PROVIDER:  Gwyn Finch MD, [facility] Surgery      PRESENTING COMPLAINT:  Consultation for ventral hernia repair and abdominal wall reconstruction.      HISTORY OF PRESENTING COMPLAINT:  Ms. Brewer is 37 years old.  She underwent a  in 2017 and after that developed a wound infection and ultimately a hernia has developed in the  scar area.  She was seen by Dr. Finch who has recommended weight loss of about 50 pounds and then potential repair.  She is here to discuss options for that combined surgical repair in the future.      PAST MEDICAL HISTORY:  Hypothyroidism.      PAST SURGICAL HISTORY:  .      MEDICATIONS:     1. Topamax.   2. Prozac.   3. Synthroid.      ALLERGIES:  Nil.      SOCIAL HISTORY:  Does not currently smoke.  She used to smoke 1 pack a day for 20 years, quit 2 years ago.  Rarely drinks alcohol.  Works at a desk.      REVIEW OF SYSTEMS:  Denies chest pain, shortness of breath, MI, CVA, DVT and PE.      PHYSICAL EXAMINATION:  On exam vital signs stable.  She is afebrile in no obvious distress.  She is 5 feet 2-1/2 inches, 294 pounds with a BMI of 54 kg.  On examination of her abdomen, she has a rotund abdomen with a lower abdominal panniculus.  Difficult to assess the hernia given her size.  CAT scan shows a large ventral hernia in the lower abdomen with loss of domain.      ASSESSMENT AND PLAN:  Based on above findings, a diagnosis of a ventral hernia was made with a background of morbid obesity.  Had a long discussion with the patient about ventral hernias, their repair, abdominal wall reconstruction with or without component separation with the help of buttressing with mesh.  Had a long conversation about the risk factors in her case being morbid obesity and the loss of domain and how important it is to lose weight.  Her target is 50 pounds.  Once she reaches that target, either through medical or surgical weight loss I will be happy to help out with  coordinating abdominal wall repair.  I will see her back when the timing is right.  She understood the plan and agreed.  All questions were answered.  All exam and discussion done in the presence of my nurse.      Total time spent with patient 20 minutes, more than half was counseling.      cc:   Gwyn Finch MD   [facility]          MARGARITA DUNLAP MD             D: 2017 19:11   T: 2017 20:24   MT: nh      Name:     TRISTIN ERVIN   MRN:      [id number]        Account:      [id number]   :      1980           Service Date: 2017      Document: [id number]

## 2017-12-19 ENCOUNTER — TELEPHONE (OUTPATIENT)
Dept: ENDOCRINOLOGY | Facility: CLINIC | Age: 37
End: 2017-12-19

## 2017-12-19 NOTE — TELEPHONE ENCOUNTER
Called patient to discuss follow up from opthamologist and plan of care. Will await for further orders from Dr Huang

## 2017-12-26 ENCOUNTER — E-VISIT (OUTPATIENT)
Dept: SURGERY | Facility: CLINIC | Age: 37
End: 2017-12-26

## 2017-12-26 DIAGNOSIS — Z53.9 ERRONEOUS ENCOUNTER--DISREGARD: Primary | ICD-10-CM

## 2017-12-27 ENCOUNTER — HOSPITAL ENCOUNTER (EMERGENCY)
Facility: CLINIC | Age: 37
Discharge: HOME | End: 2017-12-28
Attending: EMERGENCY MEDICINE | Admitting: EMERGENCY MEDICINE
Payer: COMMERCIAL

## 2017-12-27 ENCOUNTER — APPOINTMENT (OUTPATIENT)
Dept: ULTRASOUND IMAGING | Facility: CLINIC | Age: 37
End: 2017-12-27
Attending: EMERGENCY MEDICINE
Payer: COMMERCIAL

## 2017-12-27 DIAGNOSIS — R10.13 ABDOMINAL PAIN, EPIGASTRIC: ICD-10-CM

## 2017-12-27 DIAGNOSIS — R10.84 ABDOMINAL PAIN, GENERALIZED: ICD-10-CM

## 2017-12-27 LAB
ALBUMIN SERPL-MCNC: 3.2 G/DL (ref 3.4–5)
ALBUMIN UR-MCNC: 10 MG/DL
ALP SERPL-CCNC: 93 U/L (ref 40–150)
ALT SERPL W P-5'-P-CCNC: 33 U/L (ref 0–50)
ANION GAP SERPL CALCULATED.3IONS-SCNC: 7 MMOL/L (ref 3–14)
APPEARANCE UR: CLEAR
AST SERPL W P-5'-P-CCNC: 20 U/L (ref 0–45)
BACTERIA #/AREA URNS HPF: ABNORMAL /HPF
BASOPHILS # BLD AUTO: 0 10E9/L (ref 0–0.2)
BASOPHILS NFR BLD AUTO: 0.4 %
BILIRUB SERPL-MCNC: 0.3 MG/DL (ref 0.2–1.3)
BILIRUB UR QL STRIP: NEGATIVE
BUN SERPL-MCNC: 17 MG/DL (ref 7–30)
C DIFF TOX B STL QL: NEGATIVE
CALCIUM SERPL-MCNC: 8.4 MG/DL (ref 8.5–10.1)
CHLORIDE SERPL-SCNC: 114 MMOL/L (ref 94–109)
CO2 SERPL-SCNC: 24 MMOL/L (ref 20–32)
COLOR UR AUTO: YELLOW
CREAT SERPL-MCNC: 0.95 MG/DL (ref 0.52–1.04)
DIFFERENTIAL METHOD BLD: NORMAL
EOSINOPHIL # BLD AUTO: 0.1 10E9/L (ref 0–0.7)
EOSINOPHIL NFR BLD AUTO: 2.3 %
ERYTHROCYTE [DISTWIDTH] IN BLOOD BY AUTOMATED COUNT: 14.4 % (ref 10–15)
GFR SERPL CREATININE-BSD FRML MDRD: 67 ML/MIN/1.7M2
GLUCOSE SERPL-MCNC: 83 MG/DL (ref 70–99)
GLUCOSE UR STRIP-MCNC: NEGATIVE MG/DL
HCG SERPL QL: NEGATIVE
HCT VFR BLD AUTO: 40.8 % (ref 35–47)
HGB BLD-MCNC: 13.1 G/DL (ref 11.7–15.7)
HGB UR QL STRIP: ABNORMAL
IMM GRANULOCYTES # BLD: 0 10E9/L (ref 0–0.4)
IMM GRANULOCYTES NFR BLD: 0.2 %
INR PPP: 1.01 (ref 0.86–1.14)
KETONES UR STRIP-MCNC: NEGATIVE MG/DL
LACTATE BLD-SCNC: 0.6 MMOL/L (ref 0.7–2)
LEUKOCYTE ESTERASE UR QL STRIP: NEGATIVE
LIPASE SERPL-CCNC: 144 U/L (ref 73–393)
LYMPHOCYTES # BLD AUTO: 1.9 10E9/L (ref 0.8–5.3)
LYMPHOCYTES NFR BLD AUTO: 36.6 %
MCH RBC QN AUTO: 27.6 PG (ref 26.5–33)
MCHC RBC AUTO-ENTMCNC: 32.1 G/DL (ref 31.5–36.5)
MCV RBC AUTO: 86 FL (ref 78–100)
MONOCYTES # BLD AUTO: 0.6 10E9/L (ref 0–1.3)
MONOCYTES NFR BLD AUTO: 11.8 %
MUCOUS THREADS #/AREA URNS LPF: PRESENT /LPF
NEUTROPHILS # BLD AUTO: 2.5 10E9/L (ref 1.6–8.3)
NEUTROPHILS NFR BLD AUTO: 48.7 %
NITRATE UR QL: NEGATIVE
NRBC # BLD AUTO: 0 10*3/UL
NRBC BLD AUTO-RTO: 0 /100
PH UR STRIP: 6.5 PH (ref 5–7)
PLATELET # BLD AUTO: 255 10E9/L (ref 150–450)
POTASSIUM SERPL-SCNC: 3.8 MMOL/L (ref 3.4–5.3)
PROT SERPL-MCNC: 7.4 G/DL (ref 6.8–8.8)
RBC # BLD AUTO: 4.74 10E12/L (ref 3.8–5.2)
RBC #/AREA URNS AUTO: 3 /HPF (ref 0–2)
SODIUM SERPL-SCNC: 144 MMOL/L (ref 133–144)
SOURCE: ABNORMAL
SP GR UR STRIP: 1.02 (ref 1–1.03)
SPECIMEN SOURCE: NORMAL
SQUAMOUS #/AREA URNS AUTO: 4 /HPF (ref 0–1)
TRANS CELLS #/AREA URNS HPF: <1 /HPF (ref 0–1)
UROBILINOGEN UR STRIP-MCNC: 2 MG/DL (ref 0–2)
WBC # BLD AUTO: 5.2 10E9/L (ref 4–11)
WBC #/AREA URNS AUTO: 1 /HPF (ref 0–2)

## 2017-12-27 PROCEDURE — 85025 COMPLETE CBC W/AUTO DIFF WBC: CPT | Performed by: EMERGENCY MEDICINE

## 2017-12-27 PROCEDURE — 76705 ECHO EXAM OF ABDOMEN: CPT

## 2017-12-27 PROCEDURE — 25000125 ZZHC RX 250: Performed by: EMERGENCY MEDICINE

## 2017-12-27 PROCEDURE — 99285 EMERGENCY DEPT VISIT HI MDM: CPT | Mod: Z6 | Performed by: EMERGENCY MEDICINE

## 2017-12-27 PROCEDURE — 80053 COMPREHEN METABOLIC PANEL: CPT | Performed by: EMERGENCY MEDICINE

## 2017-12-27 PROCEDURE — 83605 ASSAY OF LACTIC ACID: CPT | Performed by: EMERGENCY MEDICINE

## 2017-12-27 PROCEDURE — 84703 CHORIONIC GONADOTROPIN ASSAY: CPT | Performed by: EMERGENCY MEDICINE

## 2017-12-27 PROCEDURE — 25000132 ZZH RX MED GY IP 250 OP 250 PS 637: Performed by: EMERGENCY MEDICINE

## 2017-12-27 PROCEDURE — 81001 URINALYSIS AUTO W/SCOPE: CPT | Performed by: EMERGENCY MEDICINE

## 2017-12-27 PROCEDURE — 87493 C DIFF AMPLIFIED PROBE: CPT | Performed by: EMERGENCY MEDICINE

## 2017-12-27 PROCEDURE — 85610 PROTHROMBIN TIME: CPT | Performed by: EMERGENCY MEDICINE

## 2017-12-27 PROCEDURE — 83690 ASSAY OF LIPASE: CPT | Performed by: EMERGENCY MEDICINE

## 2017-12-27 PROCEDURE — 87506 IADNA-DNA/RNA PROBE TQ 6-11: CPT | Performed by: EMERGENCY MEDICINE

## 2017-12-27 PROCEDURE — 99285 EMERGENCY DEPT VISIT HI MDM: CPT | Mod: 25 | Performed by: EMERGENCY MEDICINE

## 2017-12-27 RX ADMIN — LIDOCAINE HYDROCHLORIDE 30 ML: 20 SOLUTION ORAL; TOPICAL at 22:19

## 2017-12-27 ASSESSMENT — ENCOUNTER SYMPTOMS
NAUSEA: 1
CHILLS: 0
ABDOMINAL PAIN: 1
DIARRHEA: 1
BLOOD IN STOOL: 0
FEVER: 0
SHORTNESS OF BREATH: 0
VOMITING: 1

## 2017-12-27 NOTE — ED AVS SNAPSHOT
[facility], [place], Emergency Department    [address] [place] [address]    Phone:  [phone]                                       Brianna Brewer   MRN: [id number]    Department:  [facility], Emergency Department   Date of Visit:  12/27/2017           After Visit Summary Signature Page     I have received my discharge instructions, and my questions have been answered. I have discussed any challenges I see with this plan with the nurse or doctor.    ..........................................................................................................................................  Patient/Patient Representative Signature      ..........................................................................................................................................  Patient Representative Print Name and Relationship to Patient    ..................................................               ................................................  Date                                            Time    ..........................................................................................................................................  Reviewed by Signature/Title    ...................................................              ..............................................  Date                                                            Time

## 2017-12-27 NOTE — ED AVS SNAPSHOT
[facility], Emergency Department    [address] [address]    Phone:  [phone]                                       Brianna Brewer   MRN: [id number]    Department:  [facility], Emergency Department   Date of Visit:  12/27/2017           Patient Information     Date Of Birth          1980        Your diagnoses for this visit were:     Abdominal pain, generalized     Abdominal pain, epigastric        You were seen by Daniela Delvalle MD.        Discharge Instructions       TODAY'S VISIT:  You were seen today for abdominal pain.   - Your labs and imaging were generally reassuring today, and the Surgical team also thought it was safe for your to be able to go home and follow-up with them in their clinic.   - Your additional stool testing beyond the C. Diff test that was negative is still pending. Someone from the hospital should call you if your stool studies come back positive.     FOLLOW-UP:  Please make an appointment to follow up with:  - Your Primary Care Provider and  Surgery (General) (phone: [phone]) as soon as possible.    OTHER INSTRUCTIONS:  - Do your best to stay hydrated.     RETURN TO THE EMERGENCY DEPARTMENT  Return to the Emergency Department at any time for new/worsening symptoms.         *Abdominal Pain, Unknown Cause (Female)    The exact cause of your abdominal (stomach) pain is not certain. This does not mean that this is something to worry about, or the right tests were not done. Everyone likes to know the exact cause of the problem, but sometimes with abdominal pain, there is no clear-cut cause, and this could be a good thing. The good news is that your symptoms can be treated, and you will feel better.   Your condition does not seem serious now; however, sometimes the signs of a serious problem may take more time to appear. For this reason, it is important for you to watch for any new symptoms, problems, or worsening of your condition.  Over the next  few days, the abdominal pain may come and go, or be continuous. Other common symptoms can include nausea and vomiting. Sometimes it can be difficult to tell if you feel nauseous, you may just feel bad and not associate that feeling with nausea. Constipation, diarrhea, and a fever may go along with the pain.  The pain may continue even if treated correctly over the following days. Depending on how things go, sometimes the cause can become clear and may require further or different treatment. Additional evaluations, medications, or tests may be needed.  Home care  Your health care provider may prescribe medications for pain, symptoms, or an infection.  Follow the health care provider's instructions for taking these medications.  General care    Rest until your next exam. No strenuous activities.    Try to find positions that ease discomfort. A small pillow placed on the abdomen may help relieve pain.    Something warm on your abdomen (such as a heating pad) may help, but be careful not to burn yourself.  Diet    Do not force yourself to eat, especially if having cramps, vomiting, or diarrhea.    Water is important so you do not get dehydrated. Soup may also be good. Sports drinks may also help, especially if they are not too acidic. Make sure you don't drink sugary drinks as this can make things worse. Take liquids in small amounts. Do not guzzle them.    Caffeine sometimes makes the pain and cramping worse.    Avoid dairy products if you have vomiting or diarrhea.    Don't eat large amounts at a time. Wait a few minutes between bites.    Eat a diet low in fiber (called a low-residue diet). Foods allowed include refined breads, white rice, fruit and vegetable juices without pulp, tender meats. These foods will pass more easily through the intestine.    Avoid fried or fatty foods, dairy, alcohol and spicy foods until your symptoms go away.  Follow-up care  Follow up with your health care provider as instructed, or if  your pain does not begin to improve in the next 24 hours.  When to seek medical care  Seek prompt medical care if any of the following occur:    Pain gets worse or moves to the right lower abdomen    New or worsening vomiting or diarrhea    Swelling of the abdomen    Unable to pass stool for more than three days    New fever over 101  F (38.3 C), or rising fever    Blood in vomit or bowel movements (dark red or black color)    Jaundice (yellow color of eyes and skin)    Weakness, dizziness    Chest, arm, back, neck or jaw pain    Unexpected vaginal bleeding or missed period  Call 911  Call emergency services if any of the following occur:    Trouble breathing    Confusion    Fainting or loss of consciousness    Rapid heart rate    Seizure    [id number] [facility], [address] [place] [address], [place] [id number]. All rights reserved. This information is not intended as a substitute for professional medical care. Always follow your healthcare professional's instructions.      Epigastric Pain (Uncertain Cause)     Epigastric pain can be a sign of disease in the upper abdomen. Common causes include:    Acid reflux (stomach acid flowing up into the esophagus)    Gastritis (irritation of the stomach lining)    Peptic Ulcer Disease    Inflammation of the pancreas    Gallstone    Infection in the gallbladder  Pain may be dull or burning. It may spread upward to the chest or to the back. There may be other symptoms such as belching, bloating, cramps or hunger pains. There may be weight loss or poor appetite, nausea or vomiting.  Since the diagnosis of your pain is not certain yet, further tests may sometimes be needed. Sometimes the doctor will treat you for the most likely condition to see if there is improvement before doing further tests.  Home care  Medicines    Antacids help neutralize the normal acids in your stomach. Examples are Maalox, Mylanta, Rolaids, and Tums. If you don t like the liquid, you can also try a  chewable one. You may find one works better than another for you. Overuse can cause diarrhea or constipation.    Acid blockers (H2 blockers) decrease acid production. Examples are cimetidine (Tagamet), famotidine (Pepcid) and ranitidine (Zantac).    Acid inhibitors (PPIs) decrease acid production in a different way than the blockers. You may find they work better, but can take a little longer to take effect.  Examples are omeprazole (Prilosec), lansoprazole (Prevacid), pantoprazole (Protonix), rabeprazole (Aciphex), and esomeprazole (Nexium).    Take an antacid 30-60 minutes after eating and at bedtime, but not at the same time as an acid blocker.    Try not to take NSAIDs. Aspirin may also cause problems, but if taking it for your heart or other medical reasons, talk to your doctor before stopping it; you do not want to cause a worse problem, like a heart attack or stroke.  Diet    If certain foods seem to cause your spasm, try to avoid them.     Eat slowly and chew food well before swallowing. Symptoms of gastritis can be worsened by certain foods. Limit or avoid fatty, fried, and spicy foods, as well as coffee, chocolate, mint, and foods with high acid content such as tomatoes and citrus fruit and juices (orange, grapefruit, lemon).    Avoid alcohol, caffeine, and tobacco, which can delay healing and worsen your problem.    Try eating smaller meals with snacks in between  Follow-up care  Follow up with your healthcare provider or as advised.  When to seek medical advice  Call your healthcare provider right away if any of the following occur:    Stomach pain worsens or moves to the right lower part of the abdomen    Chest pain appears, or if it worsens or spreads to the chest, back, neck, shoulder, or arm    Frequent vomiting (can t keep down liquids)    Blood in the stool or vomit (red or black color)    Feeling weak or dizzy, fainting, or having trouble breathing    Fever of 100.4 F (38 C) or higher, or as  directed by your healthcare provider    Abdominal swelling  Date Last Reviewed: 9/25/2015 2000-2017 [employer] [address] [place] [address], [place] [address]. All rights reserved. This information is not intended as a substitute for professional medical care. Always follow your healthcare professional's instructions.              Future Appointments        Provider Department Dept Phone Center    1/19/2018 9:15 AM VERONICA Pascal [facility] Medical Weight Management [phone] [facility]      24 Hour Appointment Hotline       To make an appointment at any [facility], call [phone] ([phone]). If you don't have a family doctor or clinic, we will help you find one. [facility] are conveniently located to serve the needs of you and your family.             Review of your medicines      Our records show that you are taking the medicines listed below. If these are incorrect, please call your family doctor or clinic.        Dose / Directions Last dose taken    acetaminophen 325 MG tablet   Commonly known as:  TYLENOL   Dose:  650 mg        Take 650 mg by mouth   Refills:  0        D3-1000 1000 UNITS Caps        Refills:  0        FLUoxetine 40 MG capsule   Commonly known as:  PROzac        Refills:  0        hydrOXYzine 50 MG tablet   Commonly known as:  ATARAX   Dose:   mg        Take  mg by mouth   Refills:  0        levothyroxine 75 MCG tablet   Commonly known as:  SYNTHROID/LEVOTHROID        Refills:  0        PRENATAL MULTIVITAMIN + DHA 28-0.8 & 200 MG Misc        Refills:  0        * topiramate 25 MG tablet   Commonly known as:  TOPAMAX   Quantity:  90 tablet        25mg at bedtime for week 1, 50mg at bedtime for 1 week, and 75mg at bedtime thereafter   Refills:  1        * topiramate 25 MG tablet   Commonly known as:  TOPAMAX   Quantity:  90 tablet        25 mg at bedtime for 1 week, 50 mg at bedtime for 1 week and 75 mg daily at bedtime thereafter   Refills:   3        * Notice:  This list has 2 medication(s) that are the same as other medications prescribed for you. Read the directions carefully, and ask your doctor or other care provider to review them with you.            Procedures and tests performed during your visit     Abdomen US, limited (RUQ only)    CBC with platelets differential    Clostridium difficile toxin B PCR    Comprehensive metabolic panel    Enteric Bacteria and Virus Panel by LEANA Stool    HCG qualitative    INR    Lactic acid whole blood    Lipase    UA with Microscopic reflex to Culture      Orders Needing Specimen Collection     None      Pending Results     Date and Time Order Name Status Description    12/27/2017 2153 Abdomen US, limited (RUQ only) Preliminary     12/27/2017 2153 Enteric Bacteria and Virus Panel by LEANA Stool In process             Pending Culture Results     Date and Time Order Name Status Description    12/27/2017 2153 Enteric Bacteria and Virus Panel by LEANA Stool In process             Pending Results Instructions     If you had any lab results that were not finalized at the time of your Discharge, you can call the ED Lab Result RN at [phone]. You will be contacted by this team for any positive Lab results or changes in treatment. The nurses are available 7 days a week from 10A to 6:30P.  You can leave a message 24 hours per day and they will return your call.        Thank you for choosing [facility]       Thank you for choosing [facility] for your care. Our goal is always to provide you with excellent care. Hearing back from our patients is one way we can continue to improve our services. Please take a few minutes to complete the written survey that you may receive in the mail after you visit with us. Thank you!        [employer] Information     [employer] gives you secure access to your electronic health record. If you see a primary care provider, you can also send messages to your care team and make appointments. If you have  questions, please call your primary care clinic.  If you do not have a primary care provider, please call [phone] and they will assist you.        Care EveryWhere ID     This is your Care EveryWhere ID. This could be used by other organizations to access your [facility] medical records  [id number]        Equal Access to Services     JANICE HARDIN : Nabila Zuñiga, wafito crockett, kwabena greenealnic minor, cydney hoskins. So [facility] [phone].    ATENCIÓN: Si habla español, tiene a parish disposición servicios gratuitos de asistencia lingüística. Llame al [phone].    We comply with applicable federal civil rights laws and [place] laws. We do not discriminate on the basis of race, color, national origin, age, disability, sex, sexual orientation, or gender identity.            After Visit Summary       This is your record. Keep this with you and show to your community pharmacist(s) and doctor(s) at your next visit.

## 2017-12-27 NOTE — ED NOTES
"Triage Assessment & Note:    /76  Pulse 90  Temp 97.4  F (36.3  C) (Oral)  Resp 16  Ht 1.575 m (5' 2\")  Wt 133.8 kg (295 lb)  LMP 12/23/2017 (Exact Date)  SpO2 98%  BMI 53.96 kg/m2    Patient presents with: c/o increasing abdominal pain since the 19th. Vomiting x 1 week. Was advised to come to ED my \"My Chart MD\"    Home Treatments/Remedies: Pain meds.    Febrile / Afebrile? Afebrile    Duration of C/o: 1-2 weeks    Dane Moore  December 27, 2017      "

## 2017-12-28 ENCOUNTER — TELEPHONE (OUTPATIENT)
Dept: EMERGENCY MEDICINE | Facility: CLINIC | Age: 37
End: 2017-12-28

## 2017-12-28 VITALS
RESPIRATION RATE: 20 BRPM | OXYGEN SATURATION: 100 % | HEIGHT: 62 IN | BODY MASS INDEX: 53.92 KG/M2 | HEART RATE: 90 BPM | DIASTOLIC BLOOD PRESSURE: 74 MMHG | TEMPERATURE: 98.3 F | WEIGHT: 293 LBS | SYSTOLIC BLOOD PRESSURE: 125 MMHG

## 2017-12-28 LAB
C COLI+JEJUNI+LARI FUSA STL QL NAA+PROBE: NOT DETECTED
EC STX1 GENE STL QL NAA+PROBE: NOT DETECTED
EC STX2 GENE STL QL NAA+PROBE: NOT DETECTED
ENTERIC PATHOGEN COMMENT: ABNORMAL
NOROV GI+II ORF1-ORF2 JNC STL QL NAA+PR: NOT DETECTED
RVA NSP5 STL QL NAA+PROBE: ABNORMAL
SALMONELLA SP RPOD STL QL NAA+PROBE: NOT DETECTED
SHIGELLA SP+EIEC IPAH STL QL NAA+PROBE: NOT DETECTED
V CHOL+PARA RFBL+TRKH+TNAA STL QL NAA+PR: NOT DETECTED
Y ENTERO RECN STL QL NAA+PROBE: NOT DETECTED

## 2017-12-28 NOTE — ED PROVIDER NOTES
"  History     Chief Complaint   Patient presents with     Abdominal Pain     Vomiting     HPI  Brianna Brewer is a 37 year old female with a history of morbid obesity, hypothyroidism, anxiety and ventral wall hernia following  section (with associated subsequent  wound dehiscence that also need repair), who presents to the emergency department today for abdominal pain and vomiting.      Patient reports that she has \"usual hernia pain\" in the setting of a known ventral hernia that is throughout most of her midline abdomen.  What is new is a upper abdominal discomfort, located in the epigastric region that is in addition to her usual mild midline \"hernia pain\" throughout the whole of her abdomen. Additionally reports that she began having nonbloody vomiting and watery stools on 2017 accompanied by nausea, dry heaving, and the new upper abdominal pain abdominal pain.  She notes that her abdominal pain is epigastric and notes that she has had a \"pulling sensation\" on the abdomen; she has had this for the past 1 week.  She reports that this pain is different than her past hernia pain which has been \"annoying\", constant, worse with standing, worse with extended exertion.  She had an episode of a \"shart\" yesterday.  Patient denies chance of pregnancy and notes that her LMP was just this week.  She denies having any blood in her stool or vomit.  However, she does have a history of bloody stools and last had this a couple weeks ago.  She has a history of C. Difficile, and now thinks maybe this reminds her somewhat of that.  She denies fever, chills, shortness of breath.  She denies vaginal bleeding or discharge.  She has had no falls or trauma.  She denies any skin changes or rashes.  No other new symptoms or complaints at this time.  Please see ROS for further details. Her pain is well controlled, taking her home hydrocodone that was previously prescribed.      Patient was seen in the [facility] Emergency Department on 17 for epigastric abdominal pain.  Patient was discharged home with recommendation to follow-up with the [facility] surgery to discuss her symptoms.    CT abdomen/pelvis with contrast on 17:  Impression: Stable large ventral hernia.  Small hiatal hernia.  No acute inflammatory changes within the abdomen or pelvis.    I have reviewed the Medications, Allergies, Past Medical and Surgical History, and Social History in the [employer] system.  Past Medical History:   Diagnosis Date     Anxiety      Depressive disorder      History of blood transfusion 2017    3 units of blood during      Hypothyroidism      Morbid obesity with BMI of 50.0-59.9, adult (H)        Past Surgical History:   Procedure Laterality Date     ABDOMEN SURGERY  2017         COLONOSCOPY  2017       Family History   Problem Relation Age of Onset     Thyroid Disease Mother      Obesity Mother      Thyroid Disease Maternal Grandmother        Social History   Substance Use Topics     Smoking status: Former Smoker     Packs/day: 1.00     Years: 20.00     Types: Cigarettes     Start date: 1998     Quit date: 2016     Smokeless tobacco: Never Used     Alcohol use Yes       No current facility-administered medications for this encounter.      Current Outpatient Prescriptions   Medication     topiramate (TOPAMAX) 25 MG tablet     topiramate (TOPAMAX) 25 MG tablet     Cholecalciferol (D3-1000) 1000 UNITS CAPS     FLUoxetine (PROZAC) 40 MG capsule     levothyroxine (SYNTHROID/LEVOTHROID) 75 MCG tablet     Prenatal MV-Min-Fe Fum-FA-DHA (PRENATAL MULTIVITAMIN + DHA) 28-0.8 & 200 MG MISC     acetaminophen (TYLENOL) 325 MG tablet     hydrOXYzine (ATARAX) 50 MG tablet        No Known Allergies   Review of Systems   Constitutional: Negative for chills and fever.   Respiratory: Negative for cough and shortness of breath.    Cardiovascular: Negative for chest pain and  "palpitations.   Gastrointestinal: Positive for abdominal pain (epigastric), diarrhea, nausea and vomiting. Negative for blood in stool.   Genitourinary: Negative for vaginal bleeding and vaginal discharge.   Musculoskeletal: Negative.    Skin: Negative for color change and rash.   Allergic/Immunologic: Negative for immunocompromised state.   All other systems reviewed and are negative.      Physical Exam   BP: 131/76  Pulse: 90  Temp: 97.4  F (36.3  C)  Resp: 16  Height: 157.5 cm (5' 2\")  Weight: 133.8 kg (295 lb)  SpO2: 98 %      Physical Exam  CONSTITUTIONAL: Well-developed and well-nourished. Awake and alert. Non-toxic appearance. No acute distress.   HENT:   - Head: Normocephalic and atraumatic.   - Ears: Hearing and external ear grossly normal.   - Nose: Nose normal. No rhinorrhea. No epistaxis.   - Mouth/Throat: Oropharynx is clear and MMM  EYES: Conjunctivae and lids are normal. No scleral icterus.   NECK: Normal range of motion and phonation normal. Neck supple.  No tracheal deviation, no stridor. No edema or erythema noted.  CARDIOVASCULAR: Normal rate, regular rhythm and no appreciable abnormal heart sounds.  PULMONARY/CHEST: Effort normal. No accessory muscle usage or stridor. No respiratory distress.  No appreciable abnormal breath sounds.  ABDOMEN: Soft, obese but not frankly non-distended or peritonitic. No rigidity, rebound or guarding. Mild upper abdominal pain to discomfort, but no palpable masses or peritoneal findings. No apparent incarcerated/strangulated hernia findings currently.  MUSCULOSKELETAL: Extremities warm and seemingly well perfused. No edema or calf tenderness.  NEUROLOGIC: Awake, alert. Not disoriented. Normal tone. No seizure activity. Coordination normal. GCS 15  SKIN: Skin is warm and dry. No rash noted. No diaphoresis. No pallor.   PSYCHIATRIC: Normal mood and affect. Speech and behavior normal. Thought processes linear. Cognition and memory are normal.     ED Course   9:34 PM  " "The patient was seen and examined by Daniela Delvalle MD in Room 11.     ED Course     Procedures           Labs Ordered and Resulted from Time of ED Arrival Up to the Time of Departure from the ED - No data to display         Assessments & Plan (with Medical Decision Making)   IMPRESSION: PMH notable for large ventral hernia without without obstruction based on most recent CT, small umbilical hernia, again without evidence of obstruction or acute findings, previous  5 months ago with subsequent  wound dehiscence about 9 days later which needed operative repair, who subsequently developed multiple bouts of C. Diff; presenting today at recommendation from her local facility for evaluation and possible surgical consult for acute on chronic abdominal discomfort, now worse in the upper abdomen over the last week associated with nausea, non-bloody emesis, watery diarrhea (non-bloody), worsening upper abdominal pain in addition to her chronic \"hernia pain\" as described above in the HPI/ROS. Clinically, patient appears non-toxic, NAD. Vitals grossly WNL. Per care everywhere,CT A/P with contrast on  (3 days ago) showed a stable large ventral hernia, small hiatal hernia.  No acute inflammatory changes.    Patient is obese but abdomen is soft, is tender particularly in the upper abdomen but no focal/point tenderness, diffusely tender throughout the rest of the abdomen the mostly midline. No peritoneal findings, no palpable masses or pulsatility.    DDX includes but not limited to recurrent C. diff colitis, other infectious gastroenteritis, gastritis/PUD, pancreatitis, hepatobiliary discomfort, biliary colic, abdominal wall discomfort from hernia, pain related to the hernia itself, though there is no clear symptomatolgy for strangulation, incarceration, or bowel obstruction.  Does not sound consistent with an occult thoracic cause. Also consider other infectious etiologies consider, gastritis, neurovirus, " that be a bit less likely though it is making the rounds in the community currently.  No urinary symptoms for pyelonephritis.    PLAN: Laboratory studies, urine studies, abdominal imaging with ultrasound given that was not yet evaluated and does have the new epigastric component to her symptomatology. Discussed with Surgery. Symptom management as needed with these pain under control now with patient homehydrocodone being taken.  Will continue to monitor.     RESULTS:  - Labs: CBC and CMP unremarkable, lipase normal, lactate 0.6.  Negative pregnancy.  --- See ED Course section above for particular pertinent findings and comments  - Urine: 1 WBC, 3 RBC, few bacteria, 4 squamous epithelia cells, no ketones  - Stool: C. diff and enteric panel pending    - Imaging: Images and written preliminary reports reviewed by myself and revealed:  --- Abdominal US: No acute findings    INTERVENTIONS:   - The patient took one of her home hydrocodone at beginning of visit   -- GI cocktail  -- Surgical Consult    RE-EVALUATION:  See ED Course section above for particular pertinent findings and comments  - The patient continued to do well here in the ED. No concerning change in clinical appearance, exam or vitals. Patient reports comfortable going home. Even jokes that she was hoping to get out in time before bar close.     DISCUSSIONS:  - w/ Surgery: Resident and attending have both seen/evaluated the patient here in the ED. They think OK to go home and F/U w/ Surgery as an outpatient. No emergent recommendations at this time.  - w/ Patient: I have reviewed the available findings, plan, need for close follow up, and strict return instructions with the patient and her family/loved one. They expressed understanding and agreement with this plan. All questions answered to the best of our ability at this time.     DISPOSITION/PLANNING:  - FINAL IMPRESSION: Abdominal pain  - DISPOSITION: D/C to home  --- Follow-up: Surgery Clinic and  PCP  --- Pending: Stool enteric panel  --- Recommendations: Conservative symptom management, strict return instructions      ______________________________________________________________________________    - I have reviewed the available nursing notes.      New Prescriptions    No medications on file       Final diagnoses:   None   Kaleigh DYER, am serving as a trained medical scribe to document services personally performed by Daniela Delvalle MD, based on the provider's statements to me.      Daniela DYRE MD, was physically present and have reviewed and verified the accuracy of this note documented by Kaleigh Hernandez.         12/27/2017   [facility] [place], EMERGENCY DEPARTMENT     Daniela Delvalle MD  12/29/17 0252

## 2017-12-28 NOTE — TELEPHONE ENCOUNTER
"[facility] Lab result notification:    [place] ED lab result protocol used  Enteric bacteria and virus panel    Reason for call  Notify of lab results, assess symptoms,  review ED providers recommendations/discharge instructions (if necessary) and advise per ED lab result f/u protocol    Lab Result  Rotavirus A by LEANA NDET^Not Detected Detected, Abnormal Result (A)     Information table from ED Provider visit on 17  Symptoms reported at ED visit (Chief complaint, HPI) Patient presents with     Abdominal Pain     Vomiting      HPI  Brianna Brewer is a 37 year old female with a history of morbid obesity, hypothyroidism, anxiety and ventral wall hernia following  section who presents to the emergency department today for abdominal pain and vomiting.  Patient reports that she began having vomiting and watery stools on 2017 accompanied by nausea, dry heaving, and abdominal pain.  She notes that her abdominal pain is epigastric and notes that she has had a \"pulling sensation\" on the abdomen; she has had this for the past 1 week.  She reports that this pain is different than her past hernia pain which has been \"annoying\", constant, worse with standing, worse with extended exertion.  She had an episode of a \"shart\" yesterday.  Patient denies chance of pregnancy and notes that her LMP was just this week.  He denies having any blood in her stool or vomit.  However, she does have a history of bloody stools and last had this a couple weeks ago.  She has a history of C. difficile.  She denies fever, chills, shortness of breath.  She denies vaginal bleeding or discharge.  She has had no falls or trauma.  She denies any skin changes or rashes.  Her pain is well controlled, taking her home hydrocodone here in the ED.      Patient was seen in the [facility] Emergency Department on 17 for epigastric abdominal pain.  Patient was discharged home with recommendation " "to follow-up with the [facility] surgery to discuss her symptoms.     ED providers Impression and Plan (applicable information) IMPRESSION: PMH notable for large ventral hernia without without obstruction based on most recent CT, small umbilical hernia, again without evidence of obstruction or acute findings, the previous hysterectomy , previous  5 months ago with the birth of her child, with subsequent wound dehiscence about 9 days later which needed operative repair, who subsequently developed multiple bouts of C. diff presenting today at recommendation from her local facility for evaluation and possible surgical consult for acute on chronic abdominal discomfort, now worse in the upper abdomen over the last week associated with nausea, non-bloody emesis, watery diarrhea (non-bloody), worsening upper abdominal pain in addition to her chronic \"hernia pain\" as described above in the HPI/ROS. Clinically, patient appears non-toxic, NAD. Vitals grossly WNL. Per care everywhere,CT A/P with contrast on  (3 days ago) showed a stable large ventral hernia, small hiatal hernia.  No acute inflammatory changes.     Patient is obese but abdomen is soft, is tender particularly in the upper abdomen but no focal/point tenderness, diffusely tender throughout the rest of the abdomen the mostly midline. No peritoneal findings, no palpable masses or pulsatility.     DDX includes but not limited to recurrent C. diff colitis, gastric/PUD, pancreatitis, hepatobiliary discomfort, biliary colic, abdominal wall discomfort from hernia, pain related to the hernia itself, though there is no clear symptomatolgy for strangulation, incarceration, or bowel obstruction.  Does not sound consistent with an occult thoracic cause. Also consider other infectious etiologies consider, gastritis, neurovirus, that be a bit less likely though it is making the rounds in the community currently.  No urinary symptoms for " pyelonephritis.     PLAN: Laboratory studies, urine studies, abdominal imaging with ultrasound given that was not yet evaluated and does have the new epigastric component to her symptomatology. Discussed with Surgery. Symptom management as needed with these pain under control now with patient homehydrocodone being taken.  Will continue to monitor.   Miscellaneous information DISPOSITION/PLANNING:  - FINAL IMPRESSION: Abdominal pain  - DISPOSITION: D/C to home  --- Follow-up: Surgery Clinic and PCP  --- Recommendations: Conservative symptom management, strict return instructions     RN Assessment (Patient s current Symptoms), include time called.  [Insert Left message here if message left]  12:17 pm Message left to call us back at [phone], between 10 am and 6 pm, seven days a week. May leave a message 24/7, if no one available.     PCP follow-up Questions asked: NO    [RN Name]  Dominique Lopez RN  [facility] RN  Lung Nodule and ED Lab Result F/u RN  Epic pool (ED late result f/u RN): [id number]  # [phone]

## 2017-12-28 NOTE — DISCHARGE INSTRUCTIONS
TODAY'S VISIT:  You were seen today for abdominal pain.   - Your labs and imaging were generally reassuring today, and the Surgical team also thought it was safe for your to be able to go home and follow-up with them in their clinic.   - Your additional stool testing beyond the C. Diff test that was negative is still pending. Someone from the hospital should call you if your stool studies come back positive.     FOLLOW-UP:  Please make an appointment to follow up with:  - Your Primary Care Provider and  Surgery (General) (phone: [phone]) as soon as possible.    OTHER INSTRUCTIONS:  - Do your best to stay hydrated.     RETURN TO THE EMERGENCY DEPARTMENT  Return to the Emergency Department at any time for new/worsening symptoms.         *Abdominal Pain, Unknown Cause (Female)    The exact cause of your abdominal (stomach) pain is not certain. This does not mean that this is something to worry about, or the right tests were not done. Everyone likes to know the exact cause of the problem, but sometimes with abdominal pain, there is no clear-cut cause, and this could be a good thing. The good news is that your symptoms can be treated, and you will feel better.   Your condition does not seem serious now; however, sometimes the signs of a serious problem may take more time to appear. For this reason, it is important for you to watch for any new symptoms, problems, or worsening of your condition.  Over the next few days, the abdominal pain may come and go, or be continuous. Other common symptoms can include nausea and vomiting. Sometimes it can be difficult to tell if you feel nauseous, you may just feel bad and not associate that feeling with nausea. Constipation, diarrhea, and a fever may go along with the pain.  The pain may continue even if treated correctly over the following days. Depending on how things go, sometimes the cause can become clear and may require further or different treatment. Additional  evaluations, medications, or tests may be needed.  Home care  Your health care provider may prescribe medications for pain, symptoms, or an infection.  Follow the health care provider's instructions for taking these medications.  General care    Rest until your next exam. No strenuous activities.    Try to find positions that ease discomfort. A small pillow placed on the abdomen may help relieve pain.    Something warm on your abdomen (such as a heating pad) may help, but be careful not to burn yourself.  Diet    Do not force yourself to eat, especially if having cramps, vomiting, or diarrhea.    Water is important so you do not get dehydrated. Soup may also be good. Sports drinks may also help, especially if they are not too acidic. Make sure you don't drink sugary drinks as this can make things worse. Take liquids in small amounts. Do not guzzle them.    Caffeine sometimes makes the pain and cramping worse.    Avoid dairy products if you have vomiting or diarrhea.    Don't eat large amounts at a time. Wait a few minutes between bites.    Eat a diet low in fiber (called a low-residue diet). Foods allowed include refined breads, white rice, fruit and vegetable juices without pulp, tender meats. These foods will pass more easily through the intestine.    Avoid fried or fatty foods, dairy, alcohol and spicy foods until your symptoms go away.  Follow-up care  Follow up with your health care provider as instructed, or if your pain does not begin to improve in the next 24 hours.  When to seek medical care  Seek prompt medical care if any of the following occur:    Pain gets worse or moves to the right lower abdomen    New or worsening vomiting or diarrhea    Swelling of the abdomen    Unable to pass stool for more than three days    New fever over 101  F (38.3 C), or rising fever    Blood in vomit or bowel movements (dark red or black color)    Jaundice (yellow color of eyes and skin)    Weakness, dizziness    Chest,  arm, back, neck or jaw pain    Unexpected vaginal bleeding or missed period  Call 911  Call emergency services if any of the following occur:    Trouble breathing    Confusion    Fainting or loss of consciousness    Rapid heart rate    Seizure    [id number] Vesna Mcdonald, [address] [facility], [place] [address]. All rights reserved. This information is not intended as a substitute for professional medical care. Always follow your healthcare professional's instructions.      Epigastric Pain (Uncertain Cause)     Epigastric pain can be a sign of disease in the upper abdomen. Common causes include:    Acid reflux (stomach acid flowing up into the esophagus)    Gastritis (irritation of the stomach lining)    Peptic Ulcer Disease    Inflammation of the pancreas    Gallstone    Infection in the gallbladder  Pain may be dull or burning. It may spread upward to the chest or to the back. There may be other symptoms such as belching, bloating, cramps or hunger pains. There may be weight loss or poor appetite, nausea or vomiting.  Since the diagnosis of your pain is not certain yet, further tests may sometimes be needed. Sometimes the doctor will treat you for the most likely condition to see if there is improvement before doing further tests.  Home care  Medicines    Antacids help neutralize the normal acids in your stomach. Examples are Maalox, Mylanta, Rolaids, and Tums. If you don t like the liquid, you can also try a chewable one. You may find one works better than another for you. Overuse can cause diarrhea or constipation.    Acid blockers (H2 blockers) decrease acid production. Examples are cimetidine (Tagamet), famotidine (Pepcid) and ranitidine (Zantac).    Acid inhibitors (PPIs) decrease acid production in a different way than the blockers. You may find they work better, but can take a little longer to take effect.  Examples are omeprazole (Prilosec), lansoprazole (Prevacid), pantoprazole (Protonix), rabeprazole  (Aciphex), and esomeprazole (Nexium).    Take an antacid 30-60 minutes after eating and at bedtime, but not at the same time as an acid blocker.    Try not to take NSAIDs. Aspirin may also cause problems, but if taking it for your heart or other medical reasons, talk to your doctor before stopping it; you do not want to cause a worse problem, like a heart attack or stroke.  Diet    If certain foods seem to cause your spasm, try to avoid them.     Eat slowly and chew food well before swallowing. Symptoms of gastritis can be worsened by certain foods. Limit or avoid fatty, fried, and spicy foods, as well as coffee, chocolate, mint, and foods with high acid content such as tomatoes and citrus fruit and juices (orange, grapefruit, lemon).    Avoid alcohol, caffeine, and tobacco, which can delay healing and worsen your problem.    Try eating smaller meals with snacks in between  Follow-up care  Follow up with your healthcare provider or as advised.  When to seek medical advice  Call your healthcare provider right away if any of the following occur:    Stomach pain worsens or moves to the right lower part of the abdomen    Chest pain appears, or if it worsens or spreads to the chest, back, neck, shoulder, or arm    Frequent vomiting (can t keep down liquids)    Blood in the stool or vomit (red or black color)    Feeling weak or dizzy, fainting, or having trouble breathing    Fever of 100.4 F (38 C) or higher, or as directed by your healthcare provider    Abdominal swelling  Date Last Reviewed: 9/25/2015 2000-2017 [employer]. [address] [place] [address], [place] [address]. All rights reserved. This information is not intended as a substitute for professional medical care. Always follow your healthcare professional's instructions.

## 2017-12-28 NOTE — CONSULTS
SURGERY CONSULT NOTE  2017    ASSESSMENT: Brianna Brewer is a 37 year old female h/o DMI 53, hypothyroidism, who underwent a low transverse  in August of this year, complicated by dehisence and recurrent C diff. Went on to develop a low ventral hernia without obstructive symptoms. Presents today with intermittent nausea and epigastric abdominal pain    RECOMMENDATIONS:   -no obstructive symptoms or concern for compromised bowel  -needs further optimization and weight loss before ventral hernia repair  -can follow up with Dr. Finch as planned    -no evidence of cholecystitis on US  -C diff was negative, symptoms may be related to gastroenteritis    Patient seen, findings and plan discussed with staff Dr. Sanchez, unable to reach Dr. Swanson.    Melodie Foote MD  PGY-3 General Surgery  [phone]    HISTORY PRESENTING ILLNESS: This is a 37 year old female h/o DMI 53, hypothyroidism, who underwent a low transverse  in August of this year, complicated by dehiscence and recurrent C diff. Went on to develop a low ventral hernia without obstructive symptoms. Seen in Dr. Finch's clinic in November of this year for possible repair. She was recommended to lose 50 lbs before repair and was being worked up for a possible sleeve gastrectomy.      she presented to [facility] with epigastric abdominal pain, CT scan showed stable large ventral hernia without obstructed bowel. Labs normal. (called [facility] to have these CT images pushed to us)    Since that time she has developed nausea worse with standing and sitting then laying. Denies any lower abdominal pain, has mainly periodic flares of epigastric abdominal pain. Still having soft BMs, one episodes of nausea. Less PO intake, can tolerate water.     Review of systems: 10 point ROS neg other than the symptoms noted above in the HPI.     PAST MEDICAL HISTORY:  Past Medical History:   Diagnosis Date     Anxiety      Depressive  disorder 2000     History of blood transfusion 2017    3 units of blood during      Hypothyroidism      Morbid obesity with BMI of 50.0-59.9, adult (H)        PAST SURGICAL HISTORY:  Past Surgical History:   Procedure Laterality Date     ABDOMEN SURGERY  2017         COLONOSCOPY  2017       FAMILY HISTORY:  Obesity, thyroid disease    SOCIAL HISTORY:  - 20 pack year history of smoking, quit two years ago. No ETOH  Has two children    ALLERGIES:  No Known Allergies    MEDICATIONS:    No current facility-administered medications on file prior to encounter.   Current Outpatient Prescriptions on File Prior to Encounter:  topiramate (TOPAMAX) 25 MG tablet 25mg at bedtime for week 1, 50mg at bedtime for 1 week, and 75mg at bedtime thereafter   topiramate (TOPAMAX) 25 MG tablet 25 mg at bedtime for 1 week, 50 mg at bedtime for 1 week and 75 mg daily at bedtime thereafter   Cholecalciferol (D3-1000) 1000 UNITS CAPS    FLUoxetine (PROZAC) 40 MG capsule    levothyroxine (SYNTHROID/LEVOTHROID) 75 MCG tablet    Prenatal MV-Min-Fe Fum-FA-DHA (PRENATAL MULTIVITAMIN + DHA) 28-0.8 & 200 MG MISC    acetaminophen (TYLENOL) 325 MG tablet Take 650 mg by mouth   hydrOXYzine (ATARAX) 50 MG tablet Take  mg by mouth     PHYSICAL EXAMINATION:  Temp:  [97.4  F (36.3  C)] 97.4  F (36.3  C)  Pulse:  [90] 90  Heart Rate:  [76] 76  Resp:  [16] 16  BP: (111-131)/(64-78) 111/64  SpO2:  [97 %-98 %] 97 %  General: no acute distress   Neuro: AAOx3  CV: RRR  Pulm: Non labored breathing on RA  Abd: soft, obese, non-tender, non-distended, no rebound or guarding  Pfannenstiel incision hypertrophic, no surrounding erythema edema or exudate  Ex: wwp, no pedal edema, 2+ peripheral pulses b/l    LABS:  LABS: Reviewed.   Arterial Blood Gases   No lab results found in last 7 days.  Complete Blood Count     Recent Labs  Lab 17   WBC 5.2   HGB 13.1        Basic Metabolic Panel    Recent Labs  Lab 17       POTASSIUM 3.8   CHLORIDE 114*   CO2 24   BUN 17   CR 0.95   GLC 83     Liver Function Tests    Recent Labs  Lab 12/27/17 2028   AST 20   ALT 33   ALKPHOS 93   BILITOTAL 0.3   ALBUMIN 3.2*   INR 1.01     Pancreatic Enzymes    Recent Labs  Lab 12/27/17 2028   LIPASE 144     Coagulation Profile    Recent Labs  Lab 12/27/17 2028   INR 1.01     Lactate  Invalid input(s): LACTATE    IMAGING:  Recent Results (from the past 24 hour(s))   Abdomen US, limited (RUQ only)    Narrative    EXAMINATION: Limited Abdominal Ultrasound, 12/27/2017 11:00 PM     COMPARISON: Outside CT abdomen pelvis on 9/22/2017    HISTORY: epigastric pain, ? hepatobiliary cause, epigastric pain, N/V,  loose stools;     FINDINGS:   Fluid: No evidence of ascites or pleural effusions.    Liver: The liver demonstrates normal echotexture, measuring 16.1 cm in  craniocaudal dimension. There is no focal mass.     Gallbladder: There is no wall thickening, pericholecystic fluid,  positive sonographic Pillai's sign or evidence for cholelithiasis.    Bile Ducts: Both the intra- and extrahepatic biliary system are of  normal caliber.  The common bile duct measures 2 mm in diameter.    Pancreas: Partially obscured by overlying bowel gas    Kidney: The right kidney measures 10.3 cm long. There is no  hydronephrosis or hydroureter, no shadowing renal calculi, cystic  lesion or mass.       Impression    IMPRESSION: Normal right upper quadrant ultrasound.

## 2017-12-29 ASSESSMENT — ENCOUNTER SYMPTOMS
PALPITATIONS: 0
COUGH: 0
MUSCULOSKELETAL NEGATIVE: 1
COLOR CHANGE: 0

## 2017-12-29 NOTE — TELEPHONE ENCOUNTER
"[facility]  Emergency Department Lab result notification     Patient/parent Name  Brianna Osman GALLOWAY Assessment (Patient s current Symptoms), include time called.  [Insert Left message here if message left]  Today \"feeling not too bad\".  Did review results, home care and infection control related to rotavirus.     Please Contact your PCP clinic or return to the Emergency department if your:    Symptoms return.    Symptoms worsen or other concerning symptom's.    PCP follow-up Questions asked: YES       Nara Castle RN    [facility] RN  Lung Nodule and ED Lab Results F/U RN  Epic pool (ED late result f/u RN) : [id number]   # [phone]  "

## 2018-01-04 ENCOUNTER — TELEPHONE (OUTPATIENT)
Dept: ENDOCRINOLOGY | Facility: CLINIC | Age: 38
End: 2018-01-04

## 2018-01-09 ASSESSMENT — ENCOUNTER SYMPTOMS
PANIC: 1
FEVER: 0
WEIGHT GAIN: 1
EYE PAIN: 0
EYE REDNESS: 0
ABDOMINAL PAIN: 1
HEARTBURN: 1
INCREASED ENERGY: 1
JAUNDICE: 0
NIGHT SWEATS: 1
EYE IRRITATION: 0
WEIGHT LOSS: 0
DOUBLE VISION: 1
DEPRESSION: 1
NERVOUS/ANXIOUS: 1
INSOMNIA: 1
POLYPHAGIA: 1
EYE WATERING: 0
BLOOD IN STOOL: 1
DECREASED APPETITE: 1
POLYDIPSIA: 1
RECTAL PAIN: 1
NAUSEA: 1
VOMITING: 1
BOWEL INCONTINENCE: 1
DIARRHEA: 1
HALLUCINATIONS: 0
CONSTIPATION: 0
ALTERED TEMPERATURE REGULATION: 1
FATIGUE: 1
BLOATING: 1
DECREASED CONCENTRATION: 1
CHILLS: 0

## 2018-01-19 ENCOUNTER — OFFICE VISIT (OUTPATIENT)
Dept: ENDOCRINOLOGY | Facility: CLINIC | Age: 38
End: 2018-01-19
Payer: COMMERCIAL

## 2018-01-19 ENCOUNTER — ALLIED HEALTH/NURSE VISIT (OUTPATIENT)
Dept: SURGERY | Facility: CLINIC | Age: 38
End: 2018-01-19
Payer: COMMERCIAL

## 2018-01-19 VITALS
DIASTOLIC BLOOD PRESSURE: 82 MMHG | WEIGHT: 293 LBS | OXYGEN SATURATION: 100 % | SYSTOLIC BLOOD PRESSURE: 139 MMHG | HEIGHT: 62 IN | HEART RATE: 68 BPM | BODY MASS INDEX: 53.92 KG/M2

## 2018-01-19 DIAGNOSIS — E66.01 MORBID OBESITY (H): ICD-10-CM

## 2018-01-19 RX ORDER — PHENTERMINE HYDROCHLORIDE 15 MG/1
15 CAPSULE ORAL EVERY MORNING
Qty: 30 CAPSULE | Refills: 3 | Status: SHIPPED | OUTPATIENT
Start: 2018-01-19 | End: 2018-05-18

## 2018-01-19 RX ORDER — TOPIRAMATE 25 MG/1
50 TABLET, FILM COATED ORAL 2 TIMES DAILY
Qty: 120 TABLET | Refills: 3 | Status: SHIPPED | OUTPATIENT
Start: 2018-01-19 | End: 2018-05-16

## 2018-01-19 ASSESSMENT — ENCOUNTER SYMPTOMS
PARALYSIS: 0
WEIGHT GAIN: 1
JOINT SWELLING: 0
TINGLING: 0
DEPRESSION: 1
DIZZINESS: 0
POSTURAL DYSPNEA: 0
LOSS OF CONSCIOUSNESS: 0
BLOATING: 1
DECREASED LIBIDO: 0
PANIC: 1
NIGHT SWEATS: 1
FATIGUE: 1
SMELL DISTURBANCE: 0
SKIN CHANGES: 0
ABDOMINAL PAIN: 1
MUSCLE CRAMPS: 0
SWOLLEN GLANDS: 0
STIFFNESS: 0
NAUSEA: 1
HYPOTENSION: 0
DECREASED APPETITE: 1
EXTREMITY NUMBNESS: 0
CHILLS: 0
NUMBNESS: 0
DECREASED CONCENTRATION: 1
BREAST PAIN: 0
FLANK PAIN: 0
LEG PAIN: 0
LIGHT-HEADEDNESS: 0
CONSTIPATION: 0
NERVOUS/ANXIOUS: 1
RECTAL PAIN: 1
SINUS CONGESTION: 0
EYE REDNESS: 0
HOT FLASHES: 0
SYNCOPE: 0
TREMORS: 0
ALTERED TEMPERATURE REGULATION: 1
NAIL CHANGES: 0
SORE THROAT: 0
HEMATURIA: 0
TROUBLE SWALLOWING: 0
SINUS PAIN: 0
BLOOD IN STOOL: 1
EYE PAIN: 0
DIFFICULTY URINATING: 0
EYE WATERING: 0
WEAKNESS: 0
CLAUDICATION: 0
BACK PAIN: 0
DYSURIA: 0
HOARSE VOICE: 0
NECK MASS: 0
HEARTBURN: 1
NECK PAIN: 0
BREAST MASS: 0
ARTHRALGIAS: 0
COUGH: 0
PALPITATIONS: 0
EYE IRRITATION: 0
HALLUCINATIONS: 0
BOWEL INCONTINENCE: 1
SPUTUM PRODUCTION: 0
COUGH DISTURBING SLEEP: 0
JAUNDICE: 0
WHEEZING: 0
VOMITING: 1
POOR WOUND HEALING: 0
DISTURBANCES IN COORDINATION: 0
WEIGHT LOSS: 0
TACHYCARDIA: 0
POLYPHAGIA: 1
RESPIRATORY PAIN: 0
DIARRHEA: 1
FEVER: 0
INSOMNIA: 1
EXERCISE INTOLERANCE: 0
SHORTNESS OF BREATH: 0
BRUISES/BLEEDS EASILY: 0
HEMOPTYSIS: 0
ORTHOPNEA: 0
MEMORY LOSS: 0
POLYDIPSIA: 1
HYPERTENSION: 0
DYSPNEA ON EXERTION: 0
DOUBLE VISION: 1
MYALGIAS: 0
INCREASED ENERGY: 1
TASTE DISTURBANCE: 0
LEG SWELLING: 0
SEIZURES: 0
SLEEP DISTURBANCES DUE TO BREATHING: 0
SNORES LOUDLY: 0
SPEECH CHANGE: 0
MUSCLE WEAKNESS: 0
HEADACHES: 0

## 2018-01-19 ASSESSMENT — PAIN SCALES - GENERAL: PAINLEVEL: NO PAIN (0)

## 2018-01-19 NOTE — PATIENT INSTRUCTIONS
"Nutrition Goals  1) Add protein at three meals  2) Three full meals, reduce snacking during the day  3) Eat slowly (20-30 minutes per meal), chewing foods well (25 chews per bite)  4) Eliminate calorie-containing beverages(sweet tea) and  from meals   5) 9\" Plate method (1/2 non-starchy vegetables/fruit, 1/4 lean protein, 1/4 whole grain starch - no more than 1 cup carb/meal)    Bernie Dalton, SREEKANTH, LD  If you need to schedule or reschedule with a dietitian please call [phone].    "

## 2018-01-19 NOTE — MR AVS SNAPSHOT
After Visit Summary   1/19/2018    Brianna Brewer    MRN: [id number]           Patient Information     Date Of Birth          1980        Visit Information        Provider Department      1/19/2018 9:15 AM Renetta Rossi PA-C [facility]        Today's Diagnoses     Morbid obesity (H)          Care Instructions    Increase topiramate to 50mg twice daily  Start phentermine 15mg every morning  Need to watch bariatric seminar [email or web address]  See dietitian in 1 month  See Renetta Rossi in 1 month for NBS visit   Goal weight is 290 lbs.   Psychiatric evaluation    MEDICATION STARTED AT THIS APPOINTMENT    We are starting Phentermine. Take one tablet in the morning.  Call the nurse at [phone] if you have any questions or concerns. (Do not stop taking it if you don't think it's working. For some people it works without them knowing it.)    Phentermine is being prescribed because you identified hunger as one of the main causes for your extra weight.      Our patients on Phentermine find that they:    >feel less hunger    >find it easier to push the plate away   >have an easier time eating less    For some of our patients, these feelings are very real and immediate. For other patients, the feelings are less obvious. They don't feel much of a change but find they've lost weight. Like all weight loss medications, Phentermine  works best when you help it work. This means:  1. Having less tempting high calorie (fattening) food around the house or office. (For people with strong cravings this is very important.)   2. Staying away from situations or people that may trigger your cravings .   3. Eating out only one time or less each week.  4. Eating your meals at a table with the TV or computer off.    Side-effects. Phentermine is generally well tolerated. The main side-effects we see are feelings of racing pulse or rapid heart beat. Some people can get an elevated  blood pressure. Because of this we may have you come back within a week or so of starting the medication for a blood pressure check.         In order to get refills of this or any medication we prescribe you must be seen in the medical weight mgmt clinic every 2-3 months. Please have your pharmacy fax a refill request to [phone].              Follow-ups after your visit        Your next 10 appointments already scheduled     Jan 19, 2018  9:15 AM CST   (Arrive by 9:00 AM)   Return Weight Management Visit with Renetta Rossi PA-C   [facility] ([facility] [place])    [address]  [address] [facility] [address] [phone]              Who to contact     Please call your clinic at [phone] to:    Ask questions about your health    Make or cancel appointments    Discuss your medicines    Learn about your test results    Speak to your doctor   If you have compliments or concerns about an experience at your clinic, or if you wish to file a complaint, please contact [facility] Patient Relations at [phone] or email us at [email or web address]         Additional Information About Your Visit        [employer] Information     [facility] gives you secure access to your electronic health record. If you see a primary care provider, you can also send messages to your care team and make appointments. If you have questions, please call your primary care clinic.  If you do not have a primary care provider, please call [phone] and they will assist you.      [facility] is an electronic gateway that provides easy, online access to your medical records. With [facility], you can request a clinic appointment, read your test results, renew a prescription or communicate with your care team.     To access your existing account, please contact your [facility] or call [phone] for assistance.       "  Care EveryWhere ID     This is your Care EveryWhere ID. This could be used by other organizations to access your [facility] medical records  [id number]        Your Vitals Were     Pulse Height Last Period Pulse Oximetry BMI (Body Mass Index)       68 5' 2\" 12/23/2017 (Exact Date) 100% 56.26 kg/m2        Blood Pressure from Last 3 Encounters:   01/19/18 139/82   12/28/17 125/74   12/13/17 135/79    Weight from Last 3 Encounters:   01/19/18 (!) 307 lb 9.6 oz   12/27/17 295 lb   12/13/17 294 lb 9.6 oz              Today, you had the following     No orders found for display         Today's Medication Changes          These changes are accurate as of: 1/19/18  9:07 AM.  If you have any questions, ask your nurse or doctor.               Start taking these medicines.        Dose/Directions    phentermine 15 MG capsule   Used for:  Morbid obesity (H)   Started by:  Renetta Rossi PA-C        Dose:  15 mg   Take 1 capsule (15 mg) by mouth every morning   Quantity:  30 capsule   Refills:  3         These medicines have changed or have updated prescriptions.        Dose/Directions    topiramate 25 MG tablet   Commonly known as:  TOPAMAX   This may have changed:    - how much to take  - how to take this  - when to take this  - additional instructions  - Another medication with the same name was removed. Continue taking this medication, and follow the directions you see here.   Used for:  Morbid obesity (H)   Changed by:  Renetta Rossi PA-C        Dose:  50 mg   Take 2 tablets (50 mg) by mouth 2 times daily   Quantity:  120 tablet   Refills:  3            Where to get your medicines      These medications were sent to [id number] - [place], [place] - [address] [facility]  [address] [facility] [place] [address]     Phone:  [phone]     topiramate 25 MG tablet         Some of these will need a paper prescription and others can be bought over the counter.  Ask your nurse if you have questions.     Bring " a paper prescription for each of these medications     phentermine 15 MG capsule                Primary Care Provider Office Phone # Fax #    Yaya Kramer [phone]       [address] [address]        Equal Access to Services     JANICE HARDIN : Hadii jazmine watts hadjayesho Soomaali, waaxda luqadaha, qaybta kaalmada adeegyada, waxashley cm karoludmila cashdebidebbie hoskins. So [facility] [phone].    ATENCIÓN: Si habla español, tiene a parish disposición servicios gratuitos de asistencia lingüística. Llame al [phone].    We comply with applicable federal civil rights laws and [place] laws. We do not discriminate on the basis of race, color, national origin, age, disability, sex, sexual orientation, or gender identity.            Thank you!     Thank you for choosing [facility]  for your care. Our goal is always to provide you with excellent care. Hearing back from our patients is one way we can continue to improve our services. Please take a few minutes to complete the written survey that you may receive in the mail after your visit with us. Thank you!             Your Updated Medication List - Protect others around you: Learn how to safely use, store and throw away your medicines at www.disposemymeds.org.          This list is accurate as of: 1/19/18  9:07 AM.  Always use your most recent med list.                   Brand Name Dispense Instructions for use Diagnosis    acetaminophen 325 MG tablet    TYLENOL     Take 650 mg by mouth        D3-1000 1000 UNITS Caps           FLUoxetine 40 MG capsule    PROzac          hydrOXYzine 50 MG tablet    ATARAX     Take  mg by mouth        levothyroxine 75 MCG tablet    SYNTHROID/LEVOTHROID          phentermine 15 MG capsule     30 capsule    Take 1 capsule (15 mg) by mouth every morning    Morbid obesity (H)       PRENATAL MULTIVITAMIN + DHA 28-0.8 & 200 MG Misc           topiramate 25 MG tablet     TOPAMAX    120 tablet    Take 2 tablets (50 mg) by mouth 2 times daily    Morbid obesity (H)

## 2018-01-19 NOTE — MR AVS SNAPSHOT
"                  MRN:[id number]                      After Visit Summary   1/19/2018    Brianna Brewer    MRN: [id number]           Visit Information        Provider Department      1/19/2018 10:00 AM Bernie Dalton RD [facility]        Your next 10 appointments already scheduled     Mar 02, 2018 10:15 AM CST   (Arrive by 10:00 AM)   Return Weight Management Visit with VERONICA Pascal [facility] ([facility] [place])    [address] [place] [id number] [phone]            Mar 02, 2018 11:00 AM CST   (Arrive by 10:45 AM)   NUTRITION VISIT with SREEKANTH Wakefield [facility] ([facility])    [address] [place] [id number] [phone]              Care Instructions    Nutrition Goals  1) Add protein at three meals  2) Three full meals, reduce snacking during the day  3) Eat slowly (20-30 minutes per meal), chewing foods well (25 chews per bite)  4) Eliminate calorie-containing beverages(sweet tea) and  from meals   5) 9\" Plate method (1/2 non-starchy vegetables/fruit, 1/4 lean protein, 1/4 whole grain starch - no more than 1 cup carb/meal)    Bernie Dalton RD, LD  If you need to schedule or reschedule with a dietitian please call [phone].           [employer] Information     [employer] gives you secure access to your electronic health record. If you see a primary care provider, you can also send messages to your care team and make appointments. If you have questions, please call your primary care clinic.  If you do not have a primary care provider, please call [phone] and they will assist you.      [employer] is an electronic gateway that provides easy, online access to your medical records. With [employer] you can request a clinic appointment, read your test results, renew a prescription or communicate " with your care team.     To access your existing account, please contact your [facility] or call [phone] for assistance.        Care EveryWhere ID     This is your Care EveryWhere ID. This could be used by other organizations to access your [facility] medical records  [id number]        Equal Access to Services     JANICE HARDIN : Nabila Zuñiga, julian crockett, kwabena minor, cydney hoskins. So [facility] [phone].    ATENCIÓN: Si habla español, tiene a parish disposición servicios gratuitos de asistencia lingüística. Llame al [phone].    We comply with applicable federal civil rights laws and [place] laws. We do not discriminate on the basis of race, color, national origin, age, disability, sex, sexual orientation, or gender identity.

## 2018-01-19 NOTE — PROGRESS NOTES
"Return Medical Weight Management Note     Brianna Brewer  MRN:  [id number]  :  1980  SINCERE:  2018    Dear Yaya Kramer,    I had the pleasure of seeing your patient Brianna Brewer.  She is a 37 year old female who I am continuing to see for treatment of obesity related to:       2017   I have the following co-morbidities associated with obesity: Lower Extremity Edema, GERD (Reflux)     \"37 year old female h/o BMI 53, hypothyroidism, who underwent a low transverse  in August of this year, complicated by dehisence and recurrent C diff. Went on to develop a low ventral hernia without obstructive symptoms.\"    INTERVAL HISTORY:  Return MW visit.  Last saw Dr Finch in Dec and plan for weight loss before ventral hernia repair.  Saw Dr Huang 17 and topiramate started.    CURRENT WEIGHT:   307 lbs 9.6 oz    Wt Readings from Last 4 Encounters:   18 (!) 307 lb 9.6 oz   17 295 lb   17 294 lb 9.6 oz   17 294 lb 9.6 oz       Height:  5' 2\"  Body Mass Index:  Body mass index is 56.26 kg/(m^2).  Vitals:  /82  Pulse 68  Ht 5' 2\"  Wt (!) 307 lb 9.6 oz  LMP 2017 (Exact Date)  SpO2 100%  BMI 56.26 kg/m2    Initial consult weight was 290 on 17.  Weight change since last seen is up 17 pounds.   Total gain is 17 pounds.    Diet and Activity Changes Since Last Visit Reviewed With Patient 2018   I have made the following changes to my diet since my last visit: Stopped drinking soda completely   With regards to my diet, I am still struggling with: Eating snack foods   For breakfast, I typically eat: Peanut butter toast   For lunch, I typically eat: Smart one microwave meal   For supper, I typically eat: Snack food   For snack(s), I typically eat: Chips and dip   I have made the following changes to my activity/exercise since my last visit: None   With regards to my activity/exercise, I am still struggling with: I can't exercise "       Review of Systems     Constitutional:  Positive for weight gain, fatigue, decreased appetite, night sweats, recent stressors, post-operative complications, incisional pain and increased energy. Negative for fever, chills, weight loss, height loss, hallucinations, hyperactivity and confused.   HENT:  Negative for ear pain, hearing loss, tinnitus, nosebleeds, trouble swallowing, hoarse voice, mouth sores, sore throat, ear discharge, tooth pain, gum tenderness, taste disturbance, smell disturbance, hearing aid, bleeding gums, dry mouth, sinus pain, sinus congestion and neck mass.    Eyes:  Positive for double vision, spots and floaters. Negative for pain, redness, eye pain, decreased vision, eye watering, eye bulging, eye dryness, flashing lights, strabismus, tunnel vision, jaundice and eye irritation.   Respiratory:   Negative for cough, hemoptysis, sputum production, shortness of breath, wheezing, sleep disturbances due to breathing, snores loudly, respiratory pain, dyspnea on exertion, cough disturbing sleep and postural dyspnea.    Cardiovascular:  Negative for chest pain, dyspnea on exertion, palpitations, orthopnea, claudication, leg swelling, fingers/toes turn blue, hypertension, hypotension, syncope, history of heart murmur, chest pain on exertion, chest pain at rest, pacemaker, few scattered varicosities, leg pain, sleep disturbances due to breathing, tachycardia, light-headedness, exercise intolerance and edema.   Gastrointestinal:  Positive for heartburn, nausea, vomiting, abdominal pain, diarrhea, blood in stool, rectal pain, bloating, bowel incontinence and change in stool. Negative for constipation, melena, jaundice and coffee ground emesis.   Genitourinary:  Negative for bladder incontinence, dysuria, urgency, hematuria, flank pain, vaginal discharge, difficulty urinating, genital sores, dyspareunia, decreased libido, nocturia, voiding less frequently, arousal difficulty, abnormal vaginal bleeding,  excessive menstruation, menstrual changes, hot flashes, vaginal dryness and postmenopausal bleeding.   Musculoskeletal:  Negative for myalgias, back pain, joint swelling, arthralgias, stiffness, muscle cramps, neck pain, bone pain, muscle weakness and fracture.   Skin:  Negative for nail changes, itching, poor wound healing, rash, hair changes, skin changes, acne, warts, poor wound healing, scarring, flaky skin, Raynaud's phenomenon, sensitivity to sunlight and skin thickening.   Neurological:  Negative for dizziness, tingling, tremors, speech change, seizures, loss of consciousness, weakness, light-headedness, numbness, headaches, disturbances in coordination, extremity numbness, memory loss, difficulty walking and paralysis.   Endo/Heme:  Negative for anemia, swollen glands and bruises/bleeds easily.   Psychiatric/Behavioral:  Positive for depression, decreased concentration, mood swings and panic attacks. Negative for hallucinations and memory loss.    Breast:  Negative for breast discharge, breast mass, breast pain and nipple retraction.   Endocrine:  Positive for altered temperature regulation, polyphagia and polydipsia.Negative for unwanted hair growth and change in facial hair.      MEDICATIONS:   Current Outpatient Prescriptions   Medication     topiramate (TOPAMAX) 25 MG tablet     topiramate (TOPAMAX) 25 MG tablet     Cholecalciferol (D3-1000) 1000 UNITS CAPS     FLUoxetine (PROZAC) 40 MG capsule     levothyroxine (SYNTHROID/LEVOTHROID) 75 MCG tablet     Prenatal MV-Min-Fe Fum-FA-DHA (PRENATAL MULTIVITAMIN + DHA) 28-0.8 & 200 MG MISC     acetaminophen (TYLENOL) 325 MG tablet     hydrOXYzine (ATARAX) 50 MG tablet     No current facility-administered medications for this visit.        Weight Loss Medication History Reviewed With Patient 1/9/2018   Which weight loss medications are you currently taking on a regular basis?  Topamax (topiramate)   Are you having any side effects from the weight loss medication  that we have prescribed you? No       ASSESSMENT:   37 y.o. Female here for MWM follow up and to discuss possible sleeve gastrectomy.  She has gained weight and hasn't noticed a difference in hunger with the topiramate but she has stopped drinking soda completely.      PLAN:   Increase topiramate to 50mg twice daily  Start phentermine 15mg every morning. Risks and side effects reviewed  Need to watch bariatric seminar [email or web address]  See dietitian in 1 month  See Renetta Rossi in 1 month for NBS visit   Goal weight is 290 lbs.   Psychiatric evaluation, schedule as soon as possible  Surgery timing will be dependent on weight loss, see RD today and possible partial liquid diet?      FOLLOW-UP:    4 weeks.    Time: 20 min spent on evaluation, management, counseling, education, & motivational interviewing with greater than 50 % of the total time was spent on counseling and coordinating care    Sincerely,    Renetta Rossi PA-C

## 2018-01-19 NOTE — NURSING NOTE
"(   Chief Complaint   Patient presents with     RECHECK     f/u     )    ( Weight: (!) 307 lb 9.6 oz )  ( Height: 5' 2\" )  ( BMI (Calculated): 56.38 )  (   )  (   )  (   )  (   )  (   )  (   )    ( BP: 139/82 )  (   )  (   )  (   )  ( Pulse: 68 )  (   )  ( SpO2: 100 % )    (   Patient Active Problem List   Diagnosis     Ventral hernia without obstruction or gangrene    )  (   Current Outpatient Prescriptions   Medication Sig Dispense Refill     topiramate (TOPAMAX) 25 MG tablet 25mg at bedtime for week 1, 50mg at bedtime for 1 week, and 75mg at bedtime thereafter 90 tablet 1     topiramate (TOPAMAX) 25 MG tablet 25 mg at bedtime for 1 week, 50 mg at bedtime for 1 week and 75 mg daily at bedtime thereafter 90 tablet 3     Cholecalciferol (D3-1000) 1000 UNITS CAPS        FLUoxetine (PROZAC) 40 MG capsule        levothyroxine (SYNTHROID/LEVOTHROID) 75 MCG tablet        Prenatal MV-Min-Fe Fum-FA-DHA (PRENATAL MULTIVITAMIN + DHA) 28-0.8 & 200 MG MISC        acetaminophen (TYLENOL) 325 MG tablet Take 650 mg by mouth       hydrOXYzine (ATARAX) 50 MG tablet Take  mg by mouth      )  ( Diabetes Eval:    )    ( Pain Eval:  No Pain (0) )    ( Wound Eval:       )    (   History   Smoking Status     Former Smoker     Packs/day: 1.00     Years: 20.00     Types: Cigarettes     Start date: 1/1/1998     Quit date: 5/8/2016   Smokeless Tobacco     Never Used    )    ( Signed By:  Lenin Marsh; January 19, 2018; 8:41 AM )    "

## 2018-01-19 NOTE — PROGRESS NOTES
"Brianna Brewer (Jen) is a 36 year old female presents today for return weight management nutrition consultation.  Patient referred by Dr Huang(11/17/17).  Patient is interested in a gastric sleeve, patient needs three visits this is patient's second visit.      Estimated body mass index is 56.26 kg/(m^2) as calculated from the following:    Height as of an earlier encounter on 1/19/18: 1.575 m (5' 2\").    Weight as of an earlier encounter on 1/19/18: 139.5 kg (307 lb 9.6 oz).     Goal weight for bariatric surgery of 290 lbs or less    Goal to loose 50 pounds for hernia surgery (goal 240 pounds)    Nutrition history  Dislikes fruit and vegetables  Recent diet recall:  Breakfast- pb toast  Lunch- (at work) smart ones, bagels with cheese  Dinner- mac and cheese, tortillas and cheese, microwave dinners (smart ones)  Snack- turkey, cheese, pepperoni     Progress with previous goals:  1) Cut out pop, cut back by half each week - stopped completely(started topiramate)  2) Add protein at three meals - struggling with this, has a hard time planning a meal and eats bites of different foods throughout the evening  3) Three full meals, reduce snacking during the day - continues  4) Weight loss    Nutrition Prescription  Volumetric diet (per MD)    Nutrition Diagnosis  Obesity r/t long history of self-monitoring deficit and excessive energy intake aeb BMI >30.    Nutrition Intervention  Intervention Provided/Education Provided on post-op diet guidelines, vitamins/minerals essential post-operatively, GI anatomy of bariatric surgeries, ways to help prepare for post-op diet guidelines pre-operatively, portion/calorie-control, and sources of protein.  Discussed the modified liquid diet with patient, patient is interested in trying the modified liquid det over the next month.  Patient purchased one box of meal replacements from clinic today.  Provided pt with list of goals and RD contact information.    Patient Understanding: " "good  Expected Compliance: good  Follow-Up Plans: Physical activity -none     Nutrition Goals  1) Add protein at three meals  2) Three full meals, reduce snacking during the day  3) Eat slowly (20-30 minutes per meal), chewing foods well (25 chews per bite)  4) Eliminate calorie-containing beverages(sweet tea) and  from meals   5) 9\" Plate method (1/2 non-starchy vegetables/fruit, 1/4 lean protein, 1/4 whole grain starch - no more than 1 cup carb/meal)    Follow the Modified Liquid Diet for weight loss:  Breakfast: Protein Shake  Lunch: Protein Shake  Supper: 3 oz lean protein + non-starchy vegetables  Snack: non-starchy vegetables (no calorie-containing dips/condiments)  Beverages: at least 48-64 oz water between meals daily    *Protein Shake Criteria: no more than 250 Calories, at least 20 grams of protein, and less than 10 grams of sugar     Meal Replacement Shake Options:   [facility] Meal Replacement (250 Calories, 35 g protein)   Premier Protein (160 Calories, 30 g protein)  Slim Fast Advanced Nutrition (180 Calories, 20 g protein)  Muscle Milk, lactose-free, 17 oz bottle (210 Calories, 30 g protein)  Integrated Supplements, no artificial sugars (110 Calories, 20 g protein)  Quest Protein Bars (190 Calories, 20 g protein)  No Cow Protein Bar, gluten, dairy, and soy free (200 Calories, 20 g protein)    Frozen Meal Replacements  Healthy Choice  Lean Cuisine  Atkins Meals  Smart Ones      Follow-Up:  PRN    Time spent with patient: 30 minutes.  Bernie Dalton, RD, LD          "

## 2018-01-26 PROBLEM — E66.01 MORBID OBESITY (H): Status: ACTIVE | Noted: 2018-01-26

## 2018-01-26 PROBLEM — F32.A DEPRESSION: Status: ACTIVE | Noted: 2018-01-26

## 2018-01-28 ENCOUNTER — HEALTH MAINTENANCE LETTER (OUTPATIENT)
Age: 38
End: 2018-01-28

## 2018-02-01 NOTE — TELEPHONE ENCOUNTER
APPT INFO    Date /Time: 2/15/18 at 1:30PM   Reason for Appt: NBS   Ref Provider/Clinic: Renetta Rossi   Are there internal records? Yes/No?  IF YES, list clinic names: [facility]  [facility] ED 12/27/17   Are there outside records? Yes/No? [facility] - records scanned into [employer]   Patient Contact (Y/N) & Call Details: No   Action: Chart reviewed

## 2018-02-14 ENCOUNTER — CARE COORDINATION (OUTPATIENT)
Dept: SURGERY | Facility: CLINIC | Age: 38
End: 2018-02-14

## 2018-02-15 ENCOUNTER — APPOINTMENT (OUTPATIENT)
Dept: LAB | Facility: CLINIC | Age: 38
End: 2018-02-15
Payer: COMMERCIAL

## 2018-02-15 ENCOUNTER — ALLIED HEALTH/NURSE VISIT (OUTPATIENT)
Dept: SURGERY | Facility: CLINIC | Age: 38
End: 2018-02-15
Payer: COMMERCIAL

## 2018-02-15 ENCOUNTER — PRE VISIT (OUTPATIENT)
Dept: SURGERY | Facility: CLINIC | Age: 38
End: 2018-02-15

## 2018-02-15 ENCOUNTER — OFFICE VISIT (OUTPATIENT)
Dept: SURGERY | Facility: CLINIC | Age: 38
End: 2018-02-15
Payer: COMMERCIAL

## 2018-02-15 VITALS
DIASTOLIC BLOOD PRESSURE: 76 MMHG | SYSTOLIC BLOOD PRESSURE: 125 MMHG | HEART RATE: 90 BPM | HEIGHT: 62 IN | WEIGHT: 287.3 LBS | BODY MASS INDEX: 52.87 KG/M2 | OXYGEN SATURATION: 96 % | TEMPERATURE: 98.4 F

## 2018-02-15 DIAGNOSIS — E66.01 MORBID OBESITY (H): Primary | ICD-10-CM

## 2018-02-15 LAB
ALBUMIN SERPL-MCNC: 3.7 G/DL (ref 3.4–5)
ALP SERPL-CCNC: 84 U/L (ref 40–150)
ALT SERPL W P-5'-P-CCNC: 18 U/L (ref 0–50)
ANION GAP SERPL CALCULATED.3IONS-SCNC: 10 MMOL/L (ref 3–14)
AST SERPL W P-5'-P-CCNC: 12 U/L (ref 0–45)
BILIRUB SERPL-MCNC: 0.2 MG/DL (ref 0.2–1.3)
BUN SERPL-MCNC: 18 MG/DL (ref 7–30)
CALCIUM SERPL-MCNC: 9 MG/DL (ref 8.5–10.1)
CHLORIDE SERPL-SCNC: 109 MMOL/L (ref 94–109)
CO2 SERPL-SCNC: 21 MMOL/L (ref 20–32)
CREAT SERPL-MCNC: 0.94 MG/DL (ref 0.52–1.04)
DEPRECATED CALCIDIOL+CALCIFEROL SERPL-MC: 33 UG/L (ref 20–75)
ERYTHROCYTE [DISTWIDTH] IN BLOOD BY AUTOMATED COUNT: 14.5 % (ref 10–15)
GFR SERPL CREATININE-BSD FRML MDRD: 67 ML/MIN/1.7M2
GLUCOSE SERPL-MCNC: 84 MG/DL (ref 70–99)
HCT VFR BLD AUTO: 42.5 % (ref 35–47)
HGB BLD-MCNC: 13.9 G/DL (ref 11.7–15.7)
MCH RBC QN AUTO: 28.1 PG (ref 26.5–33)
MCHC RBC AUTO-ENTMCNC: 32.7 G/DL (ref 31.5–36.5)
MCV RBC AUTO: 86 FL (ref 78–100)
PLATELET # BLD AUTO: 326 10E9/L (ref 150–450)
POTASSIUM SERPL-SCNC: 4.1 MMOL/L (ref 3.4–5.3)
PROT SERPL-MCNC: 8 G/DL (ref 6.8–8.8)
PTH-INTACT SERPL-MCNC: 88 PG/ML (ref 12–72)
RBC # BLD AUTO: 4.95 10E12/L (ref 3.8–5.2)
SODIUM SERPL-SCNC: 139 MMOL/L (ref 133–144)
WBC # BLD AUTO: 6.9 10E9/L (ref 4–11)

## 2018-02-15 NOTE — MR AVS SNAPSHOT
"              After Visit Summary   2/15/2018    Brianna Brewer    MRN: [id number]           Patient Information     Date Of Birth          1980        Visit Information        Provider Department      2/15/2018 1:30 PM Renetta Rossi PA-C [facility] Surgical Weight Management        Today's Diagnoses     Morbid obesity (H)    -  1      Care Instructions    See dietitian in 1 month    See Dr Swanson after psych eval complete. Call Esdras De Jesus to help schedule if needed.    Call to schedule psych eval ASAP    Continue topiramate and phentermine current doses.    Bariatric Task List  Status:  Is patient a candidate for bariatric surgery?:  Yes -     Cleared to schedule surgeon consult?:    -     Status:  surgery evaluation in process -     Surgeon: Dr Swanson -     Tentative surgery month/year: May 2018 -        Insurance: Insurance:  [employer] -        Patient Info: Initial Weight:  289 when seeing Dr Finch for hernia consult. 287 at [facility]  -     Date of Initial Weight/Height:  11/8/2017 -     Goal Weight (lbs):  279 -     Required Weight Loss:  10 -     Surgery Type:  sleeve gastrectomy -        Dietician Visits: Structured weight loss required by insurance?:  Yes -     Dietician Visit 1:  Completed - 11/17/17   Dietician Visit 2:  Completed - 1/19/18   Dietician Visit 3:  Completed - 2/15/18      Psychological Evaluation: Psych eval:  Needed -        Lab Work: Complete Blood Count:  Needed -     Comprehensive Metabolic Panel:  Needed -     Vitamin D:  Needed -     Hgb A1c:  Needed -     PTH:  Needed -        PCP: Establish care with PCP:  Completed -     PCP letter of support:  Needed -        Patient Education:  Information Session:  Needed -     Given \"Making your decision\" handout?:  Yes -     Given support group information?:  Yes -     Support plan in place?:  Completed -     Research consents signed?:  Yes -        Final Tasks:  Before surgery online class:  Needed -     Before surgery online " class website link:  [email or web address]   After surgery online class:  Needed -     After surgery online class website link:  [email or web address]   Nurse visit for weigh-in and information:  Needed -     Pre-assessment clinic visit with anesthesia team for H&P:  Needed -     Final labs (Hgb, plt, T&S, UA):  Needed -        Notes:   -                     Follow-ups after your visit        Your next 10 appointments already scheduled     Feb 15, 2018  2:00 PM CST   (Arrive by 1:45 PM)   NUTRITION VISIT with SREEKANTH [facility] Surgical [facility] Management ([facility])    [address] [place] [id number] [phone]            Mar 15, 2018 10:00 AM CDT   (Arrive by 9:45 AM)   NUTRITION VISIT with SREEKANTH [facility] Management ([facility])    [address] [place] [address] [phone]              Who to contact     Please call your clinic at [phone] to:    Ask questions about your health    Make or cancel appointments    Discuss your medicines    Learn about your test results    Speak to your doctor            Additional Information About Your Visit        [employer] Information     [employer] gives you secure access to your electronic health record. If you see a primary care provider, you can also send messages to your care team and make appointments. If you have questions, please call your primary care clinic.  If you do not have a primary care provider, please call [phone] and they will assist you.      [employer] is an electronic gateway that provides easy, online access to your medical records. With [employer], you can request a clinic appointment, read your test results, renew a prescription or communicate with your care team.     To access your existing account, please contact your [facility] or call  "[phone] for assistance.        Care EveryWhere ID     This is your Care EveryWhere ID. This could be used by other organizations to access your [facility] medical records  [id number]        Your Vitals Were     Pulse Temperature Height Pulse Oximetry BMI (Body Mass Index)       90 98.4  F (36.9  C) (Oral) 5' 2.4\" 96% 51.87 kg/m2        Blood Pressure from Last 3 Encounters:   02/15/18 125/76   01/19/18 139/82   12/28/17 125/74    Weight from Last 3 Encounters:   02/15/18 287 lb 4.8 oz   01/19/18 (!) 307 lb 9.6 oz   12/27/17 295 lb              We Performed the Following     CBC with platelets     Comprehensive metabolic panel     Parathyroid Hormone Intact     Vitamin D Deficiency        Primary Care Provider Office Phone # Fax #    Yaya PAYNE Williams [phone]       [address] [address]        Equal Access to Services     JANICE HARDIN : Hadii jazmine ku hadasho Soomaali, waaxda luqadaha, qaybta kaalmada adeegyada, waxay mirthain tyrese han . So [facility] [phone].    ATENCIÓN: Si habla español, tiene a parish disposición servicios gratuitos de asistencia lingüística. Llame al [phone].    We comply with applicable federal civil rights laws and [place] laws. We do not discriminate on the basis of race, color, national origin, age, disability, sex, sexual orientation, or gender identity.            Thank you!     Thank you for choosing [facility] SURGICAL WEIGHT MANAGEMENT  for your care. Our goal is always to provide you with excellent care. Hearing back from our patients is one way we can continue to improve our services. Please take a few minutes to complete the written survey that you may receive in the mail after your visit with us. Thank you!             Your Updated Medication List - Protect others around you: Learn how to safely use, store and throw away your medicines at www.disposemymeds.org.          This list is accurate as of " 2/15/18  1:47 PM.  Always use your most recent med list.                   Brand Name Dispense Instructions for use Diagnosis    acetaminophen 325 MG tablet    TYLENOL     Take 650 mg by mouth        D3-1000 1000 UNITS Caps           FLUoxetine 40 MG capsule    PROzac          hydrOXYzine 50 MG tablet    ATARAX     Take  mg by mouth        levothyroxine 75 MCG tablet    SYNTHROID/LEVOTHROID          phentermine 15 MG capsule     30 capsule    Take 1 capsule (15 mg) by mouth every morning    Morbid obesity (H)       PRENATAL MULTIVITAMIN + DHA 28-0.8 & 200 MG Misc           topiramate 25 MG tablet    TOPAMAX    120 tablet    Take 2 tablets (50 mg) by mouth 2 times daily    Morbid obesity (H)

## 2018-02-15 NOTE — NURSING NOTE
"(   Chief Complaint   Patient presents with     Consult     [facility]    )    ( Weight: 287 lb 4.8 oz )  ( Height: 5' 2.4\" )  ( BMI (Calculated): 51.98 )  ( Initial Weight: 287 lb 4.8 oz )  ( Cumulative weight loss (lbs): 0 )  (   )  (   )  ( Waist Circumference (cm): 151.5 cm )  (   )    ( BP: 125/76 )  (   )  ( Temp: 98.4  F (36.9  C) )  ( Temp src: Oral )  ( Pulse: 90 )  (   )  ( SpO2: 96 % )    (   Patient Active Problem List   Diagnosis     Ventral hernia without obstruction or gangrene     Morbid obesity (H)     Depression    )  (   Current Outpatient Prescriptions   Medication Sig Dispense Refill     topiramate (TOPAMAX) 25 MG tablet Take 2 tablets (50 mg) by mouth 2 times daily 120 tablet 3     phentermine 15 MG capsule Take 1 capsule (15 mg) by mouth every morning 30 capsule 3     Cholecalciferol (D3-1000) 1000 UNITS CAPS        FLUoxetine (PROZAC) 40 MG capsule        levothyroxine (SYNTHROID/LEVOTHROID) 75 MCG tablet        Prenatal MV-Min-Fe Fum-FA-DHA (PRENATAL MULTIVITAMIN + DHA) 28-0.8 & 200 MG MISC        acetaminophen (TYLENOL) 325 MG tablet Take 650 mg by mouth       hydrOXYzine (ATARAX) 50 MG tablet Take  mg by mouth      )  ( Diabetes Eval:    )    ( Pain Eval:  Data Unavailable )    ( Wound Eval:       )    (   History   Smoking Status     Former Smoker     Packs/day: 1.00     Years: 20.00     Types: Cigarettes     Start date: 1/1/1998     Quit date: 5/8/2016   Smokeless Tobacco     Never Used    )    ( Signed By:  Bee Manzano; February 15, 2018; 1:16 PM )    "

## 2018-02-15 NOTE — PATIENT INSTRUCTIONS
"See dietitian in 1 month    See Dr Swanson after psych eval complete. Call Esdras De Jesus to help schedule if needed.    Call to schedule psych eval ASAP    Continue topiramate and phentermine current doses.    Bariatric Task List  Status:  Is patient a candidate for bariatric surgery?:  Yes -     Cleared to schedule surgeon consult?:    -     Status:  surgery evaluation in process -     Surgeon: Dr Swanson -     Tentative surgery month/year: May 2018 -        Insurance: Insurance:  [employer] -        Patient Info: Initial Weight:  289 when seeing Dr Ficnh for hernia consult. 287 at [facility]  -     Date of Initial Weight/Height:  11/8/2017 -     Goal Weight (lbs):  279 -     Required Weight Loss:  10 -     Surgery Type:  sleeve gastrectomy -        Dietician Visits: Structured weight loss required by insurance?:  Yes -     Dietician Visit 1:  Completed - 11/17/17   Dietician Visit 2:  Completed - 1/19/18   Dietician Visit 3:  Completed - 2/15/18      Psychological Evaluation: Psych eval:  Needed -        Lab Work: Complete Blood Count:  Needed -     Comprehensive Metabolic Panel:  Needed -     Vitamin D:  Needed -     Hgb A1c:  Needed -     PTH:  Needed -        PCP: Establish care with PCP:  Completed -     PCP letter of support:  Needed -        Patient Education:  Information Session:  Needed -     Given \"Making your decision\" handout?:  Yes -     Given support group information?:  Yes -     Support plan in place?:  Completed -     Research consents signed?:  Yes -        Final Tasks:  Before surgery online class:  Needed -     Before surgery online class website link:  [email or web address]   After surgery online class:  Needed -     After surgery online class website link:  [email or web address]   Nurse visit for weigh-in and information:  Needed -     Pre-assessment clinic visit with anesthesia team for H&P:  Needed -     Final labs (Hgb, plt, T&S, UA):  Needed -        Notes:   -   "

## 2018-02-15 NOTE — LETTER
February 19, 2018        TO: Brianna EVELIN Brewer  [address] [place] [address]         Dear Ms. Brianna WATERS Osman,    We received and reviewed your test results done on 02/15/18.  You may receive more than one letter if we receive the results on multiple days.  Please share all lab and test results with your primary care provider and keep a copy for your own records.        Your test results are normal except for the following addressed below:    Your PTH is elevated and your Vitamin D level is normal.   -Please follow up with your primary care provider regarding these lab results.     If you have any questions, feel free contact us at the Call Center [phone].      Resulted Orders   Parathyroid Hormone Intact   Result Value Ref Range    Parathyroid Hormone Intact 88 (H) 12 - 72 pg/mL   Vitamin D Deficiency   Result Value Ref Range    Vitamin D Deficiency screening 33 20 - 75 ug/L      Comment:      Season, race, dietary intake, and treatment affect the concentration of   25-hydroxy-Vitamin D. Values may decrease during winter months and increase   during summer months. Values 20-29 ug/L may indicate Vitamin D insufficiency   and values <20 ug/L may indicate Vitamin D deficiency.  Vitamin D determination is routinely performed by an immunoassay specific for   25 hydroxyvitamin D3.  If an individual is on vitamin D2 (ergocalciferol)   supplementation, please specify 25 OH vitamin D2 and D3 level determination by   LCMSMS test [id number].     Comprehensive metabolic panel   Result Value Ref Range    Sodium 139 133 - 144 mmol/L    Potassium 4.1 3.4 - 5.3 mmol/L    Chloride 109 94 - 109 mmol/L    Carbon Dioxide 21 20 - 32 mmol/L    Anion Gap 10 3 - 14 mmol/L    Glucose 84 70 - 99 mg/dL    Urea Nitrogen 18 7 - 30 mg/dL    Creatinine 0.94 0.52 - 1.04 mg/dL    GFR Estimate 67 >60 mL/min/1.7m2      Comment:      Non  GFR Calc    GFR Estimate If Black 81 >60 mL/min/1.7m2      Comment:        GFR Calc    Calcium 9.0 8.5 - 10.1 mg/dL    Bilirubin Total 0.2 0.2 - 1.3 mg/dL    Albumin 3.7 3.4 - 5.0 g/dL    Protein Total 8.0 6.8 - 8.8 g/dL    Alkaline Phosphatase 84 40 - 150 U/L    ALT 18 0 - 50 U/L    AST 12 0 - 45 U/L   CBC with platelets   Result Value Ref Range    WBC 6.9 4.0 - 11.0 10e9/L    RBC Count 4.95 3.8 - 5.2 10e12/L    Hemoglobin 13.9 11.7 - 15.7 g/dL    Hematocrit 42.5 35.0 - 47.0 %    MCV 86 78 - 100 fl    MCH 28.1 26.5 - 33.0 pg    MCHC 32.7 31.5 - 36.5 g/dL    RDW 14.5 10.0 - 15.0 %    Platelet Count 326 150 - 450 10e9/L     Sincerely,      Renetta Rossi PA-C

## 2018-02-15 NOTE — PROGRESS NOTES
"Brianna Brewer (Jen) is a 36 year old female presents today for return bariatric nutrition consultation.  Patient referred by Dr Huang(11/17/17).  Patient is interested in a gastric sleeve, patient needs three visits this is patient's second consecutive visit, Pt referred by Renetta LINK (2/15/18).      Anthropometrics   Initial weight: 289 lbs(11/8/17)  Current weight: 287 lb 4.8 oz     Goal weight for bariatric surgery of 279 lbs or less    Goal to loose 50 pounds for hernia surgery (goal 240 pounds)    Nutrition history  Dislikes fruit and vegetables    Progress with previous goals:  1) Add protein at three meals - met  2) Three full meals, reduce snacking during the day - met  3) Eat slowly (20-30 minutes per meal), chewing foods well (25 chews per bite) - met/continues  4) Eliminate calorie-containing beverages(sweet tea) and  from meals - meeting, crystal light at least 48oz per day  5) 9\" Plate method (1/2 non-starchy vegetables/fruit, 1/4 lean protein, 1/4 whole grain starch - no more than 1 cup carb/meal) - using frozen meal replacements in the evenings    Follow the Modified Liquid Diet for weight loss: - met/continues  Breakfast: Protein Shake  Lunch: Protein Shake  Supper: 3 oz lean protein + non-starchy vegetables  Snack: non-starchy vegetables (no calorie-containing dips/condiments)  Beverages: at least 48-64 oz water between meals daily    Nutrition Prescription  Volumetric diet (per MD)    Nutrition Diagnosis  Obesity r/t long history of self-monitoring deficit and excessive energy intake aeb BMI >30.    Nutrition Intervention  Intervention Provided/Education Provided.  Praised patient on weight loss over the past month.  Patient plans to continue following the modified liquid diet this coming month.  Patient stated she tried different shakes and only tolerates the Atkins shake and bar.  Provided pt with list of goals and RD contact information.    Patient Understanding: " "good  Expected Compliance: good  Follow-Up Plans: Physical activity -none     Nutrition Goals  1) Add protein at three meals  2) Three full meals, reduce snacking during the day  3) Eat slowly (20-30 minutes per meal), chewing foods well (25 chews per bite)  4) Eliminate calorie-containing beverages(sweet tea) and  from meals   5) 9\" Plate method (1/2 non-starchy vegetables/fruit, 1/4 lean protein, 1/4 whole grain starch - no more than 1 cup carb/meal)    Follow the Modified Liquid Diet for weight loss:  Breakfast: Protein Shake  Lunch: Protein Shake  Supper: 3 oz lean protein + non-starchy vegetables  Snack: non-starchy vegetables (no calorie-containing dips/condiments)  Beverages: at least 48-64 oz water between meals daily    Follow-Up:  PRN    Time spent with patient: 30 minutes.  Bernie Dalton, RD, LD          "

## 2018-02-15 NOTE — PROGRESS NOTES
"New Bariatric Surgery Consultation Note    RE: Brianna Brewer  MR#: [id number]  : 1980      Referring provider:       2018   Who referred you? Dr. Finch       Chief Complaint/Reason for visit: evaluation for possible weight loss surgery    Dear Yaya Kramer (General),    I had the pleasure of seeing your patient, Brianna Brewer, to evaluate her obesity and consider her for possible weight loss surgery. As you know, Brianna Brewer is 37 year old.  She has a height of 5' 2.402\", a weight of 287 lbs 4.8 oz, and calculated Body mass index is 51.87 kg/(m^2).    HISTORY OF PRESENT ILLNESS:  Weight Loss History Reviewed with Patient 2018   How long have you been overweight? Since early childhood   What is the most that you have ever weighed? 340   What is the most weight you have lost? 110   I have tried the following methods to lose weight Watching portions or calories, Exercise, Weight Watchers, Atkins type diet (low carb/high protein), OTC Medications, Prescription Medications   I have tried the following weight loss medications? (Check all that apply) Topamax/Topiramate, Phentermine/Adipex-p/Suprenza   Have you ever had weight loss surgery? No       Wt Readings from Last 4 Encounters:   02/15/18 287 lb 4.8 oz   18 (!) 307 lb 9.6 oz   17 295 lb   17 294 lb 9.6 oz     CO-MORBIDITIES OF OBESITY INCLUDE:     2018   I have the following co-morbidities associated with obesity: GERD (Reflux)   Are you taking daily medication for heartburn, acid reflux, or GERD (acid reflux disease)? Yes       PAST MEDICAL HISTORY:  Past Medical History:   Diagnosis Date     Anxiety      Chronic diarrhea 2017    Contracted c-diff during surgery     Depressive disorder 2000     History of blood transfusion 2017    3 units of blood during      Hypertension 2017    During pregnancy pre-eclampsia     Hypothyroidism      Morbid obesity with BMI of 50.0-59.9, adult (H)  "       PAST SURGICAL HISTORY:  Past Surgical History:   Procedure Laterality Date     ABDOMEN SURGERY  2017         COLONOSCOPY  2017       FAMILY HISTORY:   Family History   Problem Relation Age of Onset     Thyroid Disease Mother      Obesity Mother      Thyroid Disease Maternal Grandmother      CEREBROVASCULAR DISEASE Father        SOCIAL HISTORY:   Social History Questions Reviewed With Patient 2018   Which best describes your employment status (select all that apply) I work full-time   If you work, what is your occupation? [employer]   Which best describes your marital status: single   Do you have children? Yes   Who do you have in your support network that can be available to help you for the first 2 weeks after surgery? Mother, friends, other family   Who can you count on for support throughout your weight loss surgery journey? Mother, friends, other family   Can you afford 3 meals a day?  Yes   Can you afford 50-60 dollars a month for vitamins? Yes       HABITS:     2018   How often do you drink alcohol? Never   Do you currently use any of the following Nicotine products? No   Have you ever used any of the following nicotine products? Cigarettes   If you previously used any of these products, what year did you quit? 2016   Have you or are you currently using street drugs or prescription strength medication for which you do not have a prescription for? No   Do you have a history of chemical dependency (alcohol or drug abuse)? No       PSYCHOLOGICAL HISTORY:   Psychological History Reviewed With Patient 2018   Have you ever attempted suicide? Never.   Have you had thoughts of suicide in the past year? No   Have you ever been hospitalized for mental illness or a suicide attempt? Never.   Do you have a history of chronic pain? No   Have you ever been diagnosed with fibromyalgia? No   Are you currently being treated for any of the following? (select all that apply) Depression  "  Are you currently seeing a therapist or counselor?  No   Are you currently seeing a psychiatrist? No       ROS:     2/12/2018   Skin:  None of the above   HEENT: None of these   Musculoskeletal: None of the above   Cardiovascular: None of the above   Pulmonary: Snoring   Gastrointestinal: Heartburn, Reflux, Constipation   Genitourinary: Stress incontinence (losing urine when coughing, sneezing, etc.)   Hematological: None of the above   Neurological: None of the above   Female only: Regular menstrual cycles       EATING BEHAVIORS:     2/12/2018   Have you or anyone else thought that you had an eating disorder? No   Do you currently binge eat (eat a large amount of food in a short time)? No   Are you an emotional eater? Yes   Do you get up to eat after falling asleep? No       EXERCISE:     2/12/2018   How often do you exercise? Never   What keeps you from being more active?  Pain, My ability to walk or move around is limited       MEDICATIONS:  Current Outpatient Prescriptions   Medication     topiramate (TOPAMAX) 25 MG tablet     phentermine 15 MG capsule     Cholecalciferol (D3-1000) 1000 UNITS CAPS     FLUoxetine (PROZAC) 40 MG capsule     levothyroxine (SYNTHROID/LEVOTHROID) 75 MCG tablet     Prenatal MV-Min-Fe Fum-FA-DHA (PRENATAL MULTIVITAMIN + DHA) 28-0.8 & 200 MG MISC     acetaminophen (TYLENOL) 325 MG tablet     hydrOXYzine (ATARAX) 50 MG tablet     No current facility-administered medications for this visit.        ALLERGIES:  No Known Allergies    LABS/IMAGING/MEDICAL RECORDS REVIEW:     PHYSICAL EXAM:  /76  Pulse 90  Temp 98.4  F (36.9  C) (Oral)  Ht 5' 2.4\"  Wt 287 lb 4.8 oz  SpO2 96%  BMI 51.87 kg/m2  General: NAD  Neurologic: A & O x 3, gait normal  Head: normocephalic, atraumatic  HEENT: PERRL, EOMI.   Respiratory: respirations unlabored  Abdomen: Obese, Soft NT ND   Extremities: No LE swelling   Skin: warm and dry.  No rashes on exposed skin  Psychiatric: Mentation and Affect appear " "normal      In summary, Brianna Brewer has Class III obesity with a body mass index of Body mass index is 51.87 kg/(m^2). kg/m2 and the comorbidities stated above. She completed an informational seminar and is a candidate for the laparoscopic gastric sleeve.  She will have to complete the following pre-requisites:  See dietitian in 1 month    See Dr Swanson after psych eval complete. Call Esdras De Jesus to help schedule if needed.    Call to schedule psych eval ASAP    Continue topiramate and phentermine current doses.  She has done very well and lost 20 lbs since last month.   Bariatric Task List  Status:  Is patient a candidate for bariatric surgery?:  Yes -     Cleared to schedule surgeon consult?:    -     Status:  surgery evaluation in process -     Surgeon: Dr Swanson -     Tentative surgery month/year: May 2018 -        Insurance: Insurance:  [employer] -        Patient Info: Initial Weight:  289 when seeing Dr Finch for hernia consult. 287 at [facility]  -     Date of Initial Weight/Height:  11/8/2017 -     Goal Weight (lbs):  279 -     Required Weight Loss:  10 -     Surgery Type:  sleeve gastrectomy -        Dietician Visits: Structured weight loss required by insurance?:  Yes -     Dietician Visit 1:  Completed - 11/17/17   Dietician Visit 2:  Completed - 1/19/18   Dietician Visit 3:  Completed - 2/15/18      Psychological Evaluation: Psych eval:  Needed -        Lab Work: Complete Blood Count:  Needed -     Comprehensive Metabolic Panel:  Needed -     Vitamin D:  Needed -     Hgb A1c:  Needed -     PTH:  Needed -        PCP: Establish care with PCP:  Completed -     PCP letter of support:  Needed -        Patient Education:  Information Session:  Needed -     Given \"Making your decision\" handout?:  Yes -     Given support group information?:  Yes -     Support plan in place?:  Completed -     Research consents signed?:  Yes -        Final Tasks:  Before surgery online class:  Needed -     Before surgery online " class website link:  [email or web address]   After surgery online class:  Needed -     After surgery online class website link:  [email or web address]   Nurse visit for weigh-in and information:  Needed -     Pre-assessment clinic visit with anesthesia team for H&P:  Needed -     Final labs (Hgb, plt, T&S, UA):  Needed -        Notes:   -       Today in the office we discussed gastric sleeve surgery. Preoperative, perioperative, and postoperative processes, management, and follow up were addressed.  Risks and benefits were outlined including the risk of death, staple line leak (1-2%), PE, DVT, ulcer, worsening GERD, N/V, stricture, hernia, wound infection, weight regain, and vitamin deficiencies. I emphasized exercise and activity along with appropriate food choice as the main foundation for weight loss with surgery providing surgical reinforcement of this.  All questions were answered.  A goal sheet and support group handout were given to the patient.    Once the patient has completed the requirements in their task list and there are no further recommendations, the pt will be allowed to see the surgeon of her choice for consultation on the laparoscopic gastric sleeve surgery. Patient verbalizes understanding of the process to surgery and expectations for the postoperative period including the need for lifelong lifestyle changes, vitamin supplementation, and laboratory monitoring.     Sincerely,    Renetta Rossi PA-C    I spent a total of 30 minutes face to face with Brianna during today's office visit. Over 50% of this time was spent counseling the patient and/or coordinating care.

## 2018-02-15 NOTE — LETTER
"2/15/2018     RE: Brianna Brewer  [address] [place] [id number]     Dear Colleague,    Thank you for referring your patient, Brianna Brewer, to the [facility] SURGICAL WE[facility] MANAGEMENT at [facility]. Please see a copy of my visit note below.    New Bariatric Surgery Consultation Note    RE: Brianna Brewer  MR#: [id number]  : 1980      Referring provider:       2018   Who referred you? Dr. Finch       Chief Complaint/Reason for visit: evaluation for possible weight loss surgery    Dear Yaya Kramer (General),    I had the pleasure of seeing your patient, Brianna Brewer, to evaluate her obesity and consider her for possible weight loss surgery. As you know, Brianna Brewer is 37 year old.  She has a height of 5' 2.402\", a weight of 287 lbs 4.8 oz, and calculated Body mass index is 51.87 kg/(m^2).    HISTORY OF PRESENT ILLNESS:  Weight Loss History Reviewed with Patient 2018   How long have you been overweight? Since early childhood   What is the most that you have ever weighed? 340   What is the most weight you have lost? 110   I have tried the following methods to lose weight Watching portions or calories, Exercise, Weight Watchers, Atkins type diet (low carb/high protein), OTC Medications, Prescription Medications   I have tried the following weight loss medications? (Check all that apply) Topamax/Topiramate, Phentermine/Adipex-p/Suprenza   Have you ever had weight loss surgery? No       Wt Readings from Last 4 Encounters:   02/15/18 287 lb 4.8 oz   18 (!) 307 lb 9.6 oz   17 295 lb   17 294 lb 9.6 oz     CO-MORBIDITIES OF OBESITY INCLUDE:     2018   I have the following co-morbidities associated with obesity: GERD (Reflux)   Are you taking daily medication for heartburn, acid reflux, or GERD (acid reflux disease)? Yes       PAST MEDICAL HISTORY:  Past Medical History:   Diagnosis Date     " Anxiety      Chronic diarrhea 2017    Contracted c-diff during surgery     Depressive disorder 2000     History of blood transfusion 2017    3 units of blood during      Hypertension 2017    During pregnancy pre-eclampsia     Hypothyroidism      Morbid obesity with BMI of 50.0-59.9, adult (H)        PAST SURGICAL HISTORY:  Past Surgical History:   Procedure Laterality Date     ABDOMEN SURGERY  2017         COLONOSCOPY  2017       FAMILY HISTORY:   Family History   Problem Relation Age of Onset     Thyroid Disease Mother      Obesity Mother      Thyroid Disease Maternal Grandmother      CEREBROVASCULAR DISEASE Father        SOCIAL HISTORY:   Social History Questions Reviewed With Patient 2018   Which best describes your employment status (select all that apply) I work full-time   If you work, what is your occupation? [employer]   Which best describes your marital status: single   Do you have children? Yes   Who do you have in your support network that can be available to help you for the first 2 weeks after surgery? Mother, friends, other family   Who can you count on for support throughout your weight loss surgery journey? Mother, friends, other family   Can you afford 3 meals a day?  Yes   Can you afford 50-60 dollars a month for vitamins? Yes       HABITS:     2018   How often do you drink alcohol? Never   Do you currently use any of the following Nicotine products? No   Have you ever used any of the following nicotine products? Cigarettes   If you previously used any of these products, what year did you quit?    Have you or are you currently using street drugs or prescription strength medication for which you do not have a prescription for? No   Do you have a history of chemical dependency (alcohol or drug abuse)? No       PSYCHOLOGICAL HISTORY:   Psychological History Reviewed With Patient 2018   Have you ever attempted suicide? Never.   Have you had thoughts  "of suicide in the past year? No   Have you ever been hospitalized for mental illness or a suicide attempt? Never.   Do you have a history of chronic pain? No   Have you ever been diagnosed with fibromyalgia? No   Are you currently being treated for any of the following? (select all that apply) Depression   Are you currently seeing a therapist or counselor?  No   Are you currently seeing a psychiatrist? No       ROS:     2/12/2018   Skin:  None of the above   HEENT: None of these   Musculoskeletal: None of the above   Cardiovascular: None of the above   Pulmonary: Snoring   Gastrointestinal: Heartburn, Reflux, Constipation   Genitourinary: Stress incontinence (losing urine when coughing, sneezing, etc.)   Hematological: None of the above   Neurological: None of the above   Female only: Regular menstrual cycles       EATING BEHAVIORS:     2/12/2018   Have you or anyone else thought that you had an eating disorder? No   Do you currently binge eat (eat a large amount of food in a short time)? No   Are you an emotional eater? Yes   Do you get up to eat after falling asleep? No       EXERCISE:     2/12/2018   How often do you exercise? Never   What keeps you from being more active?  Pain, My ability to walk or move around is limited       MEDICATIONS:  Current Outpatient Prescriptions   Medication     topiramate (TOPAMAX) 25 MG tablet     phentermine 15 MG capsule     Cholecalciferol (D3-1000) 1000 UNITS CAPS     FLUoxetine (PROZAC) 40 MG capsule     levothyroxine (SYNTHROID/LEVOTHROID) 75 MCG tablet     Prenatal MV-Min-Fe Fum-FA-DHA (PRENATAL MULTIVITAMIN + DHA) 28-0.8 & 200 MG MISC     acetaminophen (TYLENOL) 325 MG tablet     hydrOXYzine (ATARAX) 50 MG tablet     No current facility-administered medications for this visit.        ALLERGIES:  No Known Allergies    LABS/IMAGING/MEDICAL RECORDS REVIEW:     PHYSICAL EXAM:  /76  Pulse 90  Temp 98.4  F (36.9  C) (Oral)  Ht 5' 2.4\"  Wt 287 lb 4.8 oz  SpO2 96%  BMI " 51.87 kg/m2  General: NAD  Neurologic: A & O x 3, gait normal  Head: normocephalic, atraumatic  HEENT: PERRL, EOMI.   Respiratory: respirations unlabored  Abdomen: Obese, Soft NT ND   Extremities: No LE swelling   Skin: warm and dry.  No rashes on exposed skin  Psychiatric: Mentation and Affect appear normal      In summary, Brianna Brewer has Class III obesity with a body mass index of Body mass index is 51.87 kg/(m^2). kg/m2 and the comorbidities stated above. She completed an informational seminar and is a candidate for the laparoscopic gastric sleeve.  She will have to complete the following pre-requisites:  See dietitian in 1 month    See Dr Swanson after psych eval complete. Call Esdras De Jesus to help schedule if needed.    Call to schedule psych eval ASAP    Continue topiramate and phentermine current doses.  She has done very well and lost 20 lbs since last month.   Bariatric Task List  Status:  Is patient a candidate for bariatric surgery?:  Yes -     Cleared to schedule surgeon consult?:    -     Status:  surgery evaluation in process -     Surgeon: Dr Swanson -     Tentative surgery month/year: May 2018 -        Insurance: Insurance:  [employer] -        Patient Info: Initial Weight:  289 when seeing Dr Finch for hernia consult. 287 at [facility]  -     Date of Initial Weight/Height:  11/8/2017 -     Goal Weight (lbs):  279 -     Required Weight Loss:  10 -     Surgery Type:  sleeve gastrectomy -        Dietician Visits: Structured weight loss required by insurance?:  Yes -     Dietician Visit 1:  Completed - 11/17/17   Dietician Visit 2:  Completed - 1/19/18   Dietician Visit 3:  Completed - 2/15/18      Psychological Evaluation: Psych eval:  Needed -        Lab Work: Complete Blood Count:  Needed -     Comprehensive Metabolic Panel:  Needed -     Vitamin D:  Needed -     Hgb A1c:  Needed -     PTH:  Needed -        PCP: Establish care with PCP:  Completed -     PCP letter of support:  Needed -        Patient  "Education:  Information Session:  Needed -     Given \"Making your decision\" handout?:  Yes -     Given support group information?:  Yes -     Support plan in place?:  Completed -     Research consents signed?:  Yes -        Final Tasks:  Before surgery online class:  Needed -     Before surgery online class website link:  [email or web address]   After surgery online class:  Needed -     After surgery online class website link:  [email or web address]   Nurse visit for weigh-in and information:  Needed -     Pre-assessment clinic visit with anesthesia team for H&P:  Needed -     Final labs (Hgb, plt, T&S, UA):  Needed -        Notes:   -       Today in the office we discussed gastric sleeve surgery. Preoperative, perioperative, and postoperative processes, management, and follow up were addressed.  Risks and benefits were outlined including the risk of death, staple line leak (1-2%), PE, DVT, ulcer, worsening GERD, N/V, stricture, hernia, wound infection, weight regain, and vitamin deficiencies. I emphasized exercise and activity along with appropriate food choice as the main foundation for weight loss with surgery providing surgical reinforcement of this.  All questions were answered.  A goal sheet and support group handout were given to the patient.    Once the patient has completed the requirements in their task list and there are no further recommendations, the pt will be allowed to see the surgeon of her choice for consultation on the laparoscopic gastric sleeve surgery. Patient verbalizes understanding of the process to surgery and expectations for the postoperative period including the need for lifelong lifestyle changes, vitamin supplementation, and laboratory monitoring.     Sincerely,    Renetta Rossi PA-C    I spent a total of 30 minutes face to face with Brianna during today's office visit. Over 50% of this time was spent counseling the patient and/or " coordinating care.

## 2018-02-28 ENCOUNTER — TRANSFERRED RECORDS (OUTPATIENT)
Dept: HEALTH INFORMATION MANAGEMENT | Facility: CLINIC | Age: 38
End: 2018-02-28

## 2018-03-15 ENCOUNTER — TRANSFERRED RECORDS (OUTPATIENT)
Dept: HEALTH INFORMATION MANAGEMENT | Facility: CLINIC | Age: 38
End: 2018-03-15

## 2018-03-22 ENCOUNTER — ALLIED HEALTH/NURSE VISIT (OUTPATIENT)
Dept: SURGERY | Facility: CLINIC | Age: 38
End: 2018-03-22
Payer: COMMERCIAL

## 2018-03-22 VITALS — WEIGHT: 288.8 LBS | BODY MASS INDEX: 52.14 KG/M2

## 2018-03-22 NOTE — PROGRESS NOTES
"Brianna Brewer (Jen) is a 36 year old female presents today for return bariatric nutrition consultation.  Patient referred by Dr Huang(11/17/17).  Patient is interested in a gastric sleeve, patient needs three visits this is patient's third consecutive visit, Pt referred by Renetta LINK (2/15/18).      Psych last week(Thursday - 3/15/18) in [facility]    Anthropometrics   Initial weight: 289 lbs(11/8/17)  Current weight: 288.8 (+1.5 lbs in the past month, - 0.2 lbs since initial)     Goal weight for bariatric surgery of 279 lbs or less    Goal to loose 50 pounds for hernia surgery (goal 240 pounds)    Nutrition history  Dislikes fruit and vegetables    Progress with previous goals:  1) Add protein at three meals - continues 3 meals per day(modified liquid diet)  2) Three full meals, reduce snacking during the day - continues  3) Eat slowly (20-30 minutes per meal), chewing foods well (25 chews per bite) - met/continues  4) Eliminate calorie-containing beverages and  from meals - met/continues  5) 9\" Plate method (1/2 non-starchy vegetables/fruit, 1/4 lean protein, 1/4 whole grain starch - no more than 1 cup carb/meal) -met/continues    Snacks- wheat thins and cheese, protein bar with frozen meal    Patient is unable to do any physical activity at this time.    Nutrition Prescription  Volumetric diet (per MD)    Nutrition Diagnosis  Obesity r/t long history of self-monitoring deficit and excessive energy intake aeb BMI >30.    Nutrition Intervention  Intervention Provided/Education Provided.  Patient expressed feeling frustrated with process to get surgery and lack of weight loss even following the modified liquid diet.  Encouraged patient to contact PA regarding medications.  Provided information required from PCP and psychology.  Patient does not wish to continue the modified liquid diet at this time, discussed continuing with healthy choices and portions meals.  Patient may be willing to " "restart the modified liquid diet if able to increase medications to help control snacking.  Provided pt with list of goals and RD contact information.    Patient Understanding: good  Expected Compliance: good    Nutrition Goals  1) Add protein at three meals  2) Three full meals, reduce snacking during the day  3) Eat slowly (20-30 minutes per meal), chewing foods well (25 chews per bite)  4) Eliminate calorie-containing beverages(sweet tea) and  from meals   5) 9\" Plate method (1/2 non-starchy vegetables/fruit, 1/4 lean protein, 1/4 whole grain starch - no more than 1 cup carb/meal)    Follow the Modified Liquid Diet for weight loss:  Breakfast: Protein Shake  Lunch: Protein Shake  Supper: 3 oz lean protein + non-starchy vegetables  Snack: non-starchy vegetables (no calorie-containing dips/condiments)  Beverages: at least 48-64 oz water between meals daily    Follow-Up:  PRN    Time spent with patient: 45 minutes.  Bernie Dalton, RD, LD          "

## 2018-03-27 ENCOUNTER — CARE COORDINATION (OUTPATIENT)
Dept: SURGERY | Facility: CLINIC | Age: 38
End: 2018-03-27

## 2018-03-27 NOTE — PROGRESS NOTES
"Tasklist updated and copy sent to client via [employer].    Bariatric Task List  Status:  Is patient a candidate for bariatric surgery?:  Yes -     Cleared to schedule surgeon consult?:    -     Status:  surgery evaluation in process -     Surgeon: Dr Swanson -     Tentative surgery month/year: May 2018 -        Insurance: Insurance:  [employer] -        Patient Info: Initial Weight:  289 when seeing Dr Finch for hernia consult. 287 at [facility]  -     Date of Initial Weight/Height:  11/8/2017 -     Goal Weight (lbs):  279 -     Required Weight Loss:  10 -     Surgery Type:  sleeve gastrectomy -        Dietician Visits: Structured weight loss required by insurance?:  Yes -     Dietician Visit 1:  Completed - 11/17/17   Dietician Visit 2:  Completed - 1/19/18   Dietician Visit 3:  Completed - 2/15/18   Dietician Visit additional:    -  Can continue to meet with dietician until at goal weight.      Psychological Evaluation: Psych eval:  Needed - 3/15/18 appt in [place]. To have follow-up.   Therapist letter of support:    -     Psychiatrist letter of support:    -     Establish care with therapist:    -     Complete eating disorder evaluation:    -     Letter of clearance from therapist/eating disorder program:    -     Other:    -        Lab Work: Complete Blood Count:  Completed - 2/15/18 in [facility]. bks   Comprehensive Metabolic Panel:  Completed - 2/15/18 in [facility]. bks   Vitamin D:  Completed - 2/15/18 in Epic. bks   PTH:  Completed - 2/15/18 in Epic. bks      Consults/ Clearance: Medical Weight Management: Completed - Ongoing appts with Renetta Rossi PA-C. Call [phone] to schedule.   Physical Therapy/Exercise:    -        PCP: Establish care with PCP:  Completed -     Follow up with PCP:    -     PCP letter of support:  Needed -        Patient Education:  Information Session:  Completed - Viewed 1/26/18.   Given \"Making your decision\" handout?:  Yes -     Given support group information?:  Yes -     Attended support " group?:    -     Support plan in place?:  Completed -     Research consents signed?:  Yes -        Final Tasks:  Before surgery online class:  Needed -     Before surgery online class website link:  [email or web address]   After surgery online class:  Needed -     After surgery online class website link:  [email or web address]   Nurse visit for weigh-in and information:  Needed -     Pre-assessment clinic visit with anesthesia team for H&P:  Needed -     Final labs (Hgb, plt, T&S, UA):  Needed -        Notes:   -

## 2018-04-05 ENCOUNTER — TRANSFERRED RECORDS (OUTPATIENT)
Dept: HEALTH INFORMATION MANAGEMENT | Facility: CLINIC | Age: 38
End: 2018-04-05

## 2018-04-09 ENCOUNTER — TELEPHONE (OUTPATIENT)
Dept: SURGERY | Facility: CLINIC | Age: 38
End: 2018-04-09

## 2018-04-09 NOTE — TELEPHONE ENCOUNTER
----- Message from Bernie Dalton, RD sent at 3/22/2018 11:18 AM CDT -----  Regarding: call patient  Shilo Allen,   I was wondering if you would be able to reach out to Brianna, she was feeling pretty frustrated at our appointment today.  She has had all her RD visits(still struggling to loose weight) and wants to know when she can get everything scheduled/how much longer she has to wait.  She is on medications to help with weight loss, I encouraged her to contact Renetta to possibly increase.  She reported going to psych last week and needs to follow up, I provided her with information we need from her PCP and she reported she was unaware of needing this letter of support.  Thank you!  Bernie

## 2018-04-12 ENCOUNTER — TRANSFERRED RECORDS (OUTPATIENT)
Dept: HEALTH INFORMATION MANAGEMENT | Facility: CLINIC | Age: 38
End: 2018-04-12

## 2018-04-18 ENCOUNTER — TELEPHONE (OUTPATIENT)
Dept: SURGERY | Facility: CLINIC | Age: 38
End: 2018-04-18

## 2018-04-18 ENCOUNTER — CARE COORDINATION (OUTPATIENT)
Dept: SURGERY | Facility: CLINIC | Age: 38
End: 2018-04-18

## 2018-04-18 NOTE — TELEPHONE ENCOUNTER
----- Message from Esdras De Jesus sent at 4/18/2018 12:29 PM CDT -----  Regarding: update  Hi,   I just rec'd the psych eval for Brianna but it was strictly a depression screening, not bariatric clearance. She has an exclusion on her [employer] insurance so will have to follow [facility] policy. She needs 6 consecutive RD visits, has 3 but needs one in April for sure. Her actual psych eval has to be within 3 months of OR date.

## 2018-04-18 NOTE — PROGRESS NOTES
"Tasklist updated and copy sent to client via [employer].  Bariatric Task List  Status:  Is patient a candidate for bariatric surgery?:  Yes -     Cleared to schedule surgeon consult?:    -     Status:  surgery evaluation in process -     Surgeon: Dr Swanson -     Tentative surgery month/year: July 2018 -        Insurance: Insurance:  [employer] [place] -     Other[place]  [place] 6 consecutive RD visits and psych within 3 months of OR date. -        Patient Info: Initial Weight:  289 when seeing Dr Finch for hernia consult. 287 at [facility]  -     Date of Initial Weight/Height:  11/8/2017 -     Goal Weight (lbs):  279 -     Required Weight Loss:  10 -     Surgery Type:  sleeve gastrectomy -        Dietician Visits: Structured weight loss required by insurance?:  Yes -     Dietician Visit 1:  Completed - 1/19/18   Dietician Visit 2:  Completed - 2/15/18   Dietician Visit 3:  Completed - 3/22/18   Dietician Visit 4:  Needed - 4/19/18 appt   Dietician Visit 5:  Needed - 5/17/18 appt   Dietician Visit 6:  Needed - 6/21/18 appt      Psychological Evaluation: Psych eval:  Needed - 3/30/18 Yvrose Casey PsyD, HAILY.  To have follow-up 4/26 and in May per insurance.       Lab Work: Complete Blood Count:  Completed - 2/15/18 in [facility]. bks   Comprehensive Metabolic Panel:  Completed - 2/15/18 in Epic. bks   Vitamin D:  Completed - 2/15/18 in Epic. bks   PTH:  Completed - 2/15/18 in Epic. bks      Consults/ Clearance: Medical Weight Management: Completed - Ongoing appts with Renetta Rossi PA-C. Call [phone] to schedule.      PCP: Establish care with PCP:  Completed -     PCP letter of support:  Completed - 4/10/18 Received.      Patient Education:  Information Session:  Completed - Viewed 1/26/18.   Given \"Making your decision\" handout?:  Yes -     Given support group information?:  Yes -     Attended support group?:    -     Support plan in place?:  Completed -     Research consents signed?:  Yes -      "   Final Tasks:  Before surgery online class:  Needed -     Before surgery online class website link:  [email or web address]   After surgery online class:  Needed -     After surgery online class website link:  [email or web address]   Nurse visit for weigh-in and information:  Needed -     Pre-assessment clinic visit with anesthesia team for H&P:  Needed -     Final labs (Hgb, plt, T&S, UA):  Needed -        Notes: 4/18/18 Offered free Health Coaching Phone Sessions x 4 and to re-evaluate at any visit to help with weight loss and health goals.  Client to call back if she would like to start sessions. Tiffany RN at [phone]. -

## 2018-04-18 NOTE — TELEPHONE ENCOUNTER
Left message for client to call coordinator or .  Informed her that with [employer] having an exclusion, we need to follow [place] guidelines for 6 months of consecutive RD visits and psych eval to be updated within 3 months of the OR date.  Have PCP letter from [facility].  Plan:  Client to call and set up her April and May RD visits.  Could do PBS and RD in June for a July surgery.

## 2018-04-19 ENCOUNTER — ALLIED HEALTH/NURSE VISIT (OUTPATIENT)
Dept: SURGERY | Facility: CLINIC | Age: 38
End: 2018-04-19
Payer: COMMERCIAL

## 2018-04-19 NOTE — PROGRESS NOTES
"Brianna Brewer (Jen) is a 37 year-old female presents today for return bariatric nutrition consultation.  Patient referred by Dr Huang (11/17/17).  Patient is interested in a gastric sleeve.  This is patient's fourth consecutive visit of six required.  Pt referred by Renetta LINK (2/15/18).      Coordination Note:   Updated task list with completed RD visits.     Anthropometrics   Initial weight: 289 lbs(11/8/17)  Current weight: 288.8 (-6.7 lbs in the past month, -6.9 lbs since initial)     Goal weight for bariatric surgery of 279 lbs or less.    Goal to loose 50 pounds for hernia surgery (goal 240 pounds)    Nutrition history  Dislikes fruit and vegetables.    Progress with Previous Goals:  1) Add protein at three meals - Pt is following the modified liquid diet using Atkins shakes, so getting adequate protein.  2) Three full meals, reduce snacking during the day - Improving now that she is having 2 meals of solid food.   3) Eat slowly (20-30 minutes per meal), chewing foods well (25 chews per bite) - Continuing to work on this.  4) Eliminate calorie-containing beverages(sweet tea) and  from meals - Pt has Crystal Light tea instead of sweet tea and is still working on  fluids from meals.  5) 9\" Plate method (1/2 non-starchy vegetables/fruit, 1/4 lean protein, 1/4 whole grain starch - no more than 1 cup carb/meal) - Meeting with Lean Cuisgeo/Smart Ones.    Follow the Modified Liquid Diet for weight loss:  Breakfast: Protein Shake  Lunch: 2 turkey breast with cheese sandwiches (no cardenas)  Supper: 3 oz lean protein + non-starchy vegetables (Lean Cuisine or Smart Ones)  Snack: non-starchy vegetables (no calorie-containing dips/condiments)  Beverages: at least 48-64 oz water between meals daily    Patient is unable to do any physical activity at this time due to hernia issue.    Nutrition Prescription  Volumetric diet (per MD)    Nutrition Diagnosis  Obesity r/t long history of " self-monitoring deficit and excessive energy intake aeb BMI >30. -continues    Nutrition Intervention  Intervention Provided/Education Provided: Praised Pt on weight lost over the past month.  Reviewed/reinforced previous goals.  Provided pt with list of goals and RD contact information.    Patient Understanding: good  Expected Compliance: good    Nutrition Goals  1) Follow the meal plan below.  2) Eat slowly (20-30 minutes per meal), chewing foods well (25 chews per bite)  3) Continue to avoid calorie-containing beverages.  4) Work on  fluids from meals by 30 min.     Follow the Modified Liquid Diet for weight loss:  Breakfast: Protein Shake  Lunch: 2 turkey breast with cheese sandwiches (no cardenas)  Supper: 3 oz lean protein + non-starchy vegetables (Lean Cuisine or Smart Ones)  Snack: non-starchy vegetables (no calorie-containing dips/condiments)  Beverages: at least 48-64 oz water between meals daily    Follow-Up: 1 month    Time spent with patient: 30 minutes.  Salena Blanton, MS, RDN, LDN, CLT  Pager: [phone]

## 2018-04-19 NOTE — MR AVS SNAPSHOT
MRN:[id number]                      After Visit Summary   4/19/2018    Brianna Brewer    MRN: [id number]           Visit Information        Provider Department      4/19/2018 10:30 AM Salena Blanton RD [facility] Management        Your next 10 appointments already scheduled     May 17, 2018 10:30 AM CDT   (Arrive by 10:15 AM)   NUTRITION VISIT with SREEKANTH [facility] Management ([facility])    [address] [place] [id number]   [phone]            Jun 21, 2018 10:40 AM CDT   (Arrive by 10:25 AM)   Pre Bariatric Surgery with MD MAGDIEL [facility] ([facility])    [address] [place] [id number]   [phone]            Jun 21, 2018 11:00 AM CDT   (Arrive by 10:45 AM)   NUTRITION VISIT with SREEKANTH [facility] ([facility])    [address] [place] [id number]   [phone]              Care Instructions    Nutrition Goals  1) Follow the meal plan below.  2) Eat slowly (20-30 minutes per meal), chewing foods well (25 chews per bite)  3) Continue to avoid calorie-containing beverages.  4) Work on  fluids from meals by 30 min.     Follow the Modified Liquid Diet for weight loss:  Breakfast: Protein Shake  Lunch: 2 turkey breast with cheese sandwiches (no cardenas)  Supper: 3 oz lean protein + non-starchy vegetables (Lean Cuisine or Smart Ones)  Snack: non-starchy vegetables (no calorie-containing dips/condiments)  Beverages: at least 48-64 oz water between meals daily         [employer] Information     [employer] gives you secure access to your electronic health record. If you see a primary care provider, you can also send messages to your care team and make appointments. If you have questions, please call  your primary care clinic.  If you do not have a primary care provider, please call [phone] and they will assist you.      [email or web address] is an electronic gateway that provides easy, online access to your medical records. With [email or web address], you can request a clinic appointment, read your test results, renew a prescription or communicate with your care team.     To access your existing account, please contact your [facility] or call [phone] for assistance.        Care EveryWhere ID     This is your Care EveryWhere ID. This could be used by other organizations to access your [place] medical records  [id number]        Equal Access to Services     JANICE HARDIN : Nabila Zuñiga, julian crockett, kwabena minor, cydney hoskins. So [facility] [phone].    ATENCIÓN: Si habla español, tiene a parish disposición servicios gratuitos de asistencia lingüística. Llame al [phone].    We comply with applicable federal civil rights laws and [place] laws. We do not discriminate on the basis of race, color, national origin, age, disability, sex, sexual orientation, or gender identity.

## 2018-04-19 NOTE — PATIENT INSTRUCTIONS
Nutrition Goals  1) Follow the meal plan below.  2) Eat slowly (20-30 minutes per meal), chewing foods well (25 chews per bite)  3) Continue to avoid calorie-containing beverages.  4) Work on  fluids from meals by 30 min.     Follow the Modified Liquid Diet for weight loss:  Breakfast: Protein Shake  Lunch: 2 turkey breast with cheese sandwiches (no cardenas)  Supper: 3 oz lean protein + non-starchy vegetables (Lean Cuisine or Smart Ones)  Snack: non-starchy vegetables (no calorie-containing dips/condiments)  Beverages: at least 48-64 oz water between meals daily

## 2018-05-16 DIAGNOSIS — E66.01 MORBID OBESITY (H): ICD-10-CM

## 2018-05-17 ENCOUNTER — ALLIED HEALTH/NURSE VISIT (OUTPATIENT)
Dept: SURGERY | Facility: CLINIC | Age: 38
End: 2018-05-17
Payer: COMMERCIAL

## 2018-05-17 VITALS — WEIGHT: 282.7 LBS | BODY MASS INDEX: 51.04 KG/M2

## 2018-05-17 RX ORDER — TOPIRAMATE 25 MG/1
50 TABLET, FILM COATED ORAL 2 TIMES DAILY
Qty: 140 TABLET | Refills: 0 | Status: SHIPPED | OUTPATIENT
Start: 2018-05-17 | End: 2018-08-24

## 2018-05-17 NOTE — TELEPHONE ENCOUNTER
topiramate (TOPAMAX) 25 MG tablet  Last Written Prescription Date:  1/19/18  Last Fill Quantity: 120,   # refills: 3  Last Office Visit : 2/15/18  Future Office visit:  6/21/18

## 2018-05-17 NOTE — PROGRESS NOTES
Brianna JassoKenny Brewer is a 37 year-old female presents today for return bariatric nutrition consultation.  Patient referred by Dr Huang (11/17/17).  Patient is interested in a gastric sleeve.  This is patient's 5th consecutive visit of six required.  Pt referred by Renetta LINK (2/15/18).      Coordination Note:   Updated task list with completed RD visits.     Anthropometrics   Initial weight: 289 lbs(11/8/17)  Current weight: 282.7 (-6.1 lbs in the past month, -6.3 lbs since initial)     Goal weight for bariatric surgery of 279 lbs or less.    Goal to loose 50 pounds for hernia surgery (goal 240 pounds)    Nutrition history  Dislikes fruit and vegetables.    Recent food recall:  Breakfast- protein shakes  Lunch- sandwich  Dinner- lean cuisine frozen meal  Snack- protein bar  Beverages- 48-64 oz per day water    Progress with Previous Goals:  1) Follow the meal plan below. -met  2) Eat slowly (20-30 minutes per meal), chewing foods well (25 chews per bite) -met/continues  3) Continue to avoid calorie-containing beverages. -met, drinking crystal light  4) Work on  fluids from meals by 30 min. -has not been focusing on this recently    Follow the Modified Liquid Diet for weight loss:  Breakfast: Protein Shake  Lunch: 2 turkey breast with cheese sandwiches (no cardenas)  Supper: 3 oz lean protein + non-starchy vegetables (Lean Cuisine or Smart Ones)  Snack: non-starchy vegetables (no calorie-containing dips/condiments)  Beverages: at least 48-64 oz water between meals daily    Patient is unable to do any physical activity at this time due to hernia issue.    Nutrition Prescription  Volumetric diet (per MD)    Nutrition Diagnosis  Obesity r/t long history of self-monitoring deficit and excessive energy intake aeb BMI >30. -continues    Nutrition Intervention  Intervention Provided/Education Provided: Praised Pt on weight lost over the past month.  Reviewed/reinforced previous goals.  Discussed  increasing to two shakes per day if patient is not able to continue to loose weight with one shake per day.  Provided pt with list of goals and RD contact information.    Patient Understanding: good  Expected Compliance: good    Nutrition Goals  1) Follow the meal plan below.  2) Eat slowly (20-30 minutes per meal), chewing foods well (25 chews per bite)  3) Continue to avoid calorie-containing beverages.  4) Work on  fluids from meals by 30 min.     Follow the Modified Liquid Diet for weight loss:  Breakfast: Protein Shake  Lunch: 2 turkey breast with cheese sandwiches (no cardenas)  Supper: 3 oz lean protein + non-starchy vegetables (Lean Cuisine or Smart Ones)  Snack: non-starchy vegetables (no calorie-containing dips/condiments)  Beverages: at least 48-64 oz water between meals daily    Follow-Up: 1 month    Time spent with patient: 15 minutes.  Bernie Dalton RD, LD

## 2018-05-18 DIAGNOSIS — E66.01 MORBID OBESITY (H): ICD-10-CM

## 2018-05-21 NOTE — TELEPHONE ENCOUNTER
phentermine 15 MG capsule  Last Written Prescription Date:  1/19/18  Last Fill Quantity: 30,   # refills: 3  Last Office Visit : 2/15/18  Future Office visit:  6/21/18 Dr. Swanson    Routing refill request to provider for review/approval because:  Drug not on the [facility], [facility] or [facility] refill protocol or controlled substance

## 2018-05-23 RX ORDER — PHENTERMINE HYDROCHLORIDE 15 MG/1
15 CAPSULE ORAL EVERY MORNING
Qty: 30 CAPSULE | Refills: 3 | Status: ON HOLD | OUTPATIENT
Start: 2018-05-23 | End: 2018-08-29

## 2018-06-21 ENCOUNTER — OFFICE VISIT (OUTPATIENT)
Dept: SURGERY | Facility: CLINIC | Age: 38
End: 2018-06-21
Payer: MEDICAID

## 2018-06-21 ENCOUNTER — APPOINTMENT (OUTPATIENT)
Dept: SURGERY | Facility: CLINIC | Age: 38
End: 2018-06-21
Payer: MEDICAID

## 2018-06-21 ENCOUNTER — ALLIED HEALTH/NURSE VISIT (OUTPATIENT)
Dept: SURGERY | Facility: CLINIC | Age: 38
End: 2018-06-21
Payer: MEDICAID

## 2018-06-21 VITALS
TEMPERATURE: 97.6 F | HEART RATE: 87 BPM | HEIGHT: 62 IN | DIASTOLIC BLOOD PRESSURE: 66 MMHG | SYSTOLIC BLOOD PRESSURE: 110 MMHG | OXYGEN SATURATION: 99 % | WEIGHT: 283.4 LBS | BODY MASS INDEX: 52.15 KG/M2

## 2018-06-21 DIAGNOSIS — E66.01 MORBID OBESITY (H): ICD-10-CM

## 2018-06-21 DIAGNOSIS — E66.01 MORBID OBESITY (H): Primary | ICD-10-CM

## 2018-06-21 NOTE — LETTER
"6/21/2018       RE: Brianna Brewer  [address] [place] [id number]     Dear Colleague,    Thank you for referring your patient, Brianna Brewer, to the [facility]NAG[facility] at [facility]. Please see a copy of my visit note below.    Dear Dr. Kramer:      I had the pleasure of meeting with your patient Brianna Brewer in our weight loss surgery office.  This patient is a 37 year old female who has been undergoing our thorough preoperative screening process in anticipation of potential bariatric surgery.    At initial evaluation we recorded Brianna Brewer's height as 1.58 m (5' 2.21\"), weight as 131.3 kg (289 lb 8 oz), and BMI as 52.6 kg/m2.     Her elevated BMI, unfortunately, is associated with abdominal wall hernia.  She has a hernia related to dehiscence of CSection incision.    She is interested in abdominal wall re-construction and needs to lose weight in order to facilitate that.    She has been unsuccessful with other methods of permanent weight loss and suffers from multiple weight related medical conditions.  Due to lack of success in achieving weight loss through other methods, she is interested in undergoing bariatric surgery.    PREVIOUS WEIGHT LOSS ATTEMPTS:     2/12/2018   I have tried the following methods to lose weight Watching portions or calories, Exercise, Weight Watchers, Atkins type diet (low carb/high protein), OTC Medications, Prescription Medications       CO-MORBIDITIES OF OBESITY INCLUDE:     2/12/2018   I have the following co-morbidities associated with obesity: GERD (Reflux)   Are you taking daily medication for heartburn, acid reflux, or GERD (acid reflux disease)? Yes       VITALS:  /66  Pulse 87  Temp 97.6  F (36.4  C) (Oral)  Ht 1.585 m (5' 2.4\")  Wt 128.5 kg (283 lb 6.4 oz)  SpO2 99%  BMI 51.17 kg/m2    PE:  GENERAL: Alert and oriented x3. NAD  HEENT exam: Sclerae not icteric. Hearing " good. Head normocephalic and atraumatic.   CARDIOVASCULAR: No JVD  RESPIRATORY: Breathing unlabored  GASTROINTESTINAL: Obese; has abdominal wall hernia.  LOWER EXTREMITIES: no deformities  MUSCULOSKELETAL: Normal gait, Moves all 4 extremities equal and strong  NEUROLOGIC: no gross defect  SKIN: warm and dry to touch     I reviewed abdominal CT scan, which shows large abdominal wall hernia defect with fat and small bowel within.  Large enough to not be an active risk of obstruction.    In summary, she has undergone an appropriate medical evaluation, dietitian evaluation, as well as psychologic screening. The patient appears to be an appropriate candidate for bariatric surgery.    In the office today, I discussed the laparoscopic gastric sleeve surgery.  Risks, benefits and anticipated outcomes were outlined including the risk of death, staple line leak (1-2%), PE, DVT, ulcer, worsening GERD, N/V, stricture, hernia, wound infection, weight regain, and vitamin deficiencies. This patient has a good chance of sustaining permanent weight loss due to this procedure.  This should also allow improvement if not resolution of his/her weight related medical conditions.    At present we are going to present your patient's file for prior authorization to insurance.  Pending prior authorization, I anticipate a surgical date in the near future.  We will keep you updated on any progress.  If you have questions regarding care please feel free to contact me.  Total time spent in the clinic was 15 minutes with greater than 50% in face-to-face consultation.        Sincerely,    Zoran Swanson    Please route or send letter to:  Primary Care Provider (PCP) and Referring Provider

## 2018-06-21 NOTE — PATIENT INSTRUCTIONS
It was a pleasure meeting with you today.     Thank you for allowing us the privilege of caring for you. We hope we provided you with the excellent service you deserve.     Please let us know if there is anything else we can do for you so that we can be sure you are leaving completely satisfied with your care experience.      You saw Dr Swanson today.     Instructions per today's visit:     Call lashell for surgery/pac date and time [phone]      To schedule appointments with our team, please call [phone] option #1    Please call during clinic hours Monday through Friday 8:00a - 4:00p if you have questions or you can contact us via [employer] at anytime.      Nurses: [phone] Option # 3 for nurse advice line.  Fax: [phone]  Surgery Scheduler: [phone]    Please call the hospital at [phone] to speak with our on call MDs if you have urgent needs after hours, during weekends, or holidays.    *For patients who are currently enrolled in our program and have not yet had weight loss surgery please note*:    You must be at your required goal weight at the time of your Anesthesia clinic visit as well as the day of surgery or your surgery will be cancelled. You will not be able to obtain a new surgery date until you have met your goal weight.    Weight loss medications before and after surgery protocol:    Adipex (phentermine): Stop two days before surgery. Do not restart after surgery. May reconsider restarting as needed in the future.    Topimax (topiramate): Can take right up to surgery including morning of surgery and restart post op day #1.    Qsymia (phentermine/topiramate): Hold morning of surgery. Restart after surgery post op day #1    Contrave (bupropion/naltrxone): Fast taper before surgery. 1 tablet twice daily for 1 week and then discontinue 4 days before surgery.  Will reconsider restarting at postop appt once no longer taking narcotic pain meds.  Will slowly ramp up again.    Naltrexone:  Stop 4 days before surgery.  Will reconsider starting again at postop appts when no longer taking narcotic pain meds.    Belviq (locaserin): Stop 2 days before surgery and restart immediately after surgery    Victoza(liraglutide)/Saxenda(liraglutide 3mg)/Trulicity (dulaglutide) (Any GLP-1): Can take up to surgery date and restart immediately after surgery.    Main follow-up parameters for all bariatric patients     Patients who have undergone Bariatric surgery:    1. Follow-up interval during the first year: ~1 week, 1 month, 3 month, 6 month,12 months.  Every 6 months until 5 years; and then annually thereafter.  The goal of follow-up sessions is to ensure that food intake behavior (choice of food and eating speed) and exercise/activity level are set appropriately.    2. Yearly lab tests ordered by our clinic.      3. The bariatric team should be aware and potentially evaluate all adverse gastrointestinal symptoms (dysphagia, abdominal pain, nausea, vomiting, diarrhea, heartburn, reflux, etc), which can be a sign of complication.  The bariatric surgeons perform general surgery procedures in addition to bariatric surgery (laparoscopic cholecystectomy, etc.)    4. Inability to tolerate textured food (chicken, steak, fish, eggs, beans) is NOT normal and may need to be evaluated by endoscopy performed by the bariatric surgeon.

## 2018-06-21 NOTE — MR AVS SNAPSHOT
MRN:[id number]                      After Visit Summary   6/21/2018    Brianna Brewer    MRN: [id number]           Visit Information        Provider Department      6/21/2018 11:00 AM Salena Blanton RD [facility]        Care Instructions    Pre-op Goals  1) Follow the meal plan below.  2) Eat slowly (20-30 minutes per meal), chewing foods well (25 chews per bite)  3) Continue to avoid calorie-containing beverages.  4) Work on  fluids from meals by 30 min.     Follow the Modified Liquid Diet for weight loss:  Breakfast: Protein Shake  Lunch: 2 turkey breast with cheese sandwiches (no cardenas)  Supper: 3 oz lean protein + non-starchy vegetables (Lean Cuisine or Smart Ones) or Protein Shake  Snack: non-starchy vegetables (no calorie-containing dips/condiments)  Beverages: at least 48-64 oz water between meals daily    Post-op Goals:   1. Follow the bariatric post-op diet advancement schedule:  - Bariatric clear liquid diet through post-op day 1 (while in the hospital).  - Bariatric low-fat full liquid diet on post-op days 2 through 13 (2 weeks).  2. Sip on 48-64 oz (or greater) fluids daily, recording intake to help stay on-track.  - Drink at least 2 oz of fluid every 30 min.         Tribe Information     Tribe gives you secure access to your electronic health record. If you see a primary care provider, you can also send messages to your care team and make appointments. If you have questions, please call your primary care clinic.  If you do not have a primary care provider, please call [phone] and they will assist you.      Tribe is an electronic gateway that provides easy, online access to your medical records. With Tribe, you can request a clinic appointment, read your test results, renew a prescription or communicate with your care team.     To access your existing account, please contact your [facility] or  call [phone] for assistance.        Care EveryWhere ID     This is your Care EveryWhere ID. This could be used by other organizations to access your [place] medical records  [id number]        Equal Access to Services     JANICE HARDIN : Nabila Zuñiga, wasusyda bernadineadaha, qaybta kaalmada iván, cydney hoskins. So [facility] [phone].    ATENCIÓN: Si habla español, tiene a parish disposición servicios gratuitos de asistencia lingüística. Llame al [phone].    We comply with applicable federal civil rights laws and [place] laws. We do not discriminate on the basis of race, color, national origin, age, disability, sex, sexual orientation, or gender identity.

## 2018-06-21 NOTE — NURSING NOTE
This patient is having Sleeve by Dr. Swanson.    The following handouts were reviewed with the patient :  Before Your Surgery, Patient Checklist, Weight Loss Surgery Pre-operative Class, Preop Recommendations Quick Reference Guide, History and Physical, Medications to Avoid, Shower or Bathing Before Surgery, Bowel Preparation, Powerful Choices and [place] Advance Health Care Directive.  Questions were addressed and understanding of content was verbalized.  Contact information was provided.    Patient goal weight: 279  Weight today: 283      Advised to call Esdras for surgery/pac date and time

## 2018-06-21 NOTE — MR AVS SNAPSHOT
After Visit Summary   6/21/2018    Brianna Brewer    MRN: [id number]           Patient Information     Date Of Birth          1980        Visit Information        Provider Department      6/21/2018 10:40 AM Zoran Swanson MD [facility] Surgical [facility]        Care Instructions    It was a pleasure meeting with you today.     Thank you for allowing us the privilege of caring for you. We hope we provided you with the excellent service you deserve.     Please let us know if there is anything else we can do for you so that we can be sure you are leaving completely satisfied with your care experience.      You saw Dr Swanson today.     Instructions per today's visit:     Call lashell for surgery/pac date and time [phone]      To schedule appointments with our team, please call [phone] option #1    Please call during clinic hours Monday through Friday 8:00a - 4:00p if you have questions or you can contact us via [employer] at anytime.      Nurses: [phone] Option # 3 for nurse advice line.  Fax: [phone]  Surgery Scheduler: [phone]    Please call the hospital at [phone] to speak with our on call MDs if you have urgent needs after hours, during weekends, or holidays.    *For patients who are currently enrolled in our program and have not yet had weight loss surgery please note*:    You must be at your required goal weight at the time of your Anesthesia clinic visit as well as the day of surgery or your surgery will be cancelled. You will not be able to obtain a new surgery date until you have met your goal weight.    Weight loss medications before and after surgery protocol:    Adipex (phentermine): Stop two days before surgery. Do not restart after surgery. May reconsider restarting as needed in the future.    Topimax (topiramate): Can take right up to surgery including morning of surgery and restart post op day #1.    Qsymia (phentermine/topiramate): Hold  morning of surgery. Restart after surgery post op day #1    Contrave (bupropion/naltrxone): Fast taper before surgery. 1 tablet twice daily for 1 week and then discontinue 4 days before surgery.  Will reconsider restarting at postop appt once no longer taking narcotic pain meds.  Will slowly ramp up again.    Naltrexone: Stop 4 days before surgery.  Will reconsider starting again at postop appts when no longer taking narcotic pain meds.    Belviq (locaserin): Stop 2 days before surgery and restart immediately after surgery    Victoza(liraglutide)/Saxenda(liraglutide 3mg)/Trulicity (dulaglutide) (Any GLP-1): Can take up to surgery date and restart immediately after surgery.    Main follow-up parameters for all bariatric patients     Patients who have undergone Bariatric surgery:    1. Follow-up interval during the first year: ~1 week, 1 month, 3 month, 6 month,12 months.  Every 6 months until 5 years; and then annually thereafter.  The goal of follow-up sessions is to ensure that food intake behavior (choice of food and eating speed) and exercise/activity level are set appropriately.    2. Yearly lab tests ordered by our clinic.      3. The bariatric team should be aware and potentially evaluate all adverse gastrointestinal symptoms (dysphagia, abdominal pain, nausea, vomiting, diarrhea, heartburn, reflux, etc), which can be a sign of complication.  The bariatric surgeons perform general surgery procedures in addition to bariatric surgery (laparoscopic cholecystectomy, etc.)    4. Inability to tolerate textured food (chicken, steak, fish, eggs, beans) is NOT normal and may need to be evaluated by endoscopy performed by the bariatric surgeon.                        Follow-ups after your visit        Your next 10 appointments already scheduled     Jun 21, 2018 10:40 AM CDT   (Arrive by 10:25 AM)   Pre Bariatric Surgery with Zoran Swanson MD   [facility] ([facility])    [address] [id number] [phone]            Jun 21, 2018 11:00 AM CDT   (Arrive by 10:45 AM)   NUTRITION VISIT with Salena Blanton RD   [facility] Management ([facility])    [address] [place] [id number] [phone]            Jun 21, 2018 11:30 AM CDT   Nurse Visit with  Surgery Nurse   General Surgery ([facility])    [address] [place] [id number] [phone]              Who to contact     Please call your clinic at [phone] to:    Ask questions about your health    Make or cancel appointments    Discuss your medicines    Learn about your test results    Speak to your doctor            Additional Information About Your Visit        [employer] Information     [employer] gives you secure access to your electronic health record. If you see a primary care provider, you can also send messages to your care team and make appointments. If you have questions, please call your primary care clinic.  If you do not have a primary care provider, please call [phone] and they will assist you.      [employer] is an electronic gateway that provides easy, online access to your medical records. With [employer], you can request a clinic appointment, read your test results, renew a prescription or communicate with your care team.     To access your existing account, please contact your [facility] or call [phone] for assistance.        Care EveryWhere ID     This is your Care EveryWhere ID. This could be used by other organizations to access your [place] medical records  [id number]        Your Vitals Were     Pulse Temperature Height Pulse Oximetry BMI (Body Mass Index)       87 97.6  F (36.4  C) (Oral) 5' 2.4\" 99% 51.17 kg/m2        Blood Pressure from Last 3 Encounters:   06/21/18 110/66   02/15/18 125/76 "   01/19/18 139/82    Weight from Last 3 Encounters:   06/21/18 283 lb 6.4 oz   05/17/18 282 lb 11.2 oz   03/22/18 288 lb 12.8 oz              Today, you had the following     No orders found for display       Primary Care Provider Office Phone # Fax #    Yaya Kramer [phone]       [facility] [address] [address]        Equal Access to Services     JANICE HARDIN : Hadii aad ku hadasho Soomaali, waaxda luqadaha, qaybta kaalmada adeegyada, waxay idiin hayaan adeaidan han . So [facility] [phone].    ATENCIÓN: Si habla español, tiene a parish disposición servicios gratuitos de asistencia lingüística. [facility] [phone].    We comply with applicable federal civil rights laws and [place] laws. We do not discriminate on the basis of race, color, national origin, age, disability, sex, sexual orientation, or gender identity.            Thank you!     Thank you for choosing [facility]  for your care. Our goal is always to provide you with excellent care. Hearing back from our patients is one way we can continue to improve our services. Please take a few minutes to complete the written survey that you may receive in the mail after your visit with us. Thank you!             Your Updated Medication List - Protect others around you: Learn how to safely use, store and throw away your medicines at www.disposemymeds.org.          This list is accurate as of 6/21/18 10:33 AM.  Always use your most recent med list.                   Brand Name Dispense Instructions for use Diagnosis    acetaminophen 325 MG tablet    TYLENOL     Take 650 mg by mouth        D3-1000 1000 units Caps           FLUoxetine 40 MG capsule    PROzac          hydrOXYzine 50 MG tablet    ATARAX     Take  mg by mouth        levothyroxine 75 MCG tablet    SYNTHROID/LEVOTHROID          phentermine 15 MG capsule     30 capsule    Take 1 capsule (15 mg) by mouth every  morning    Morbid obesity (H)       PRENATAL MULTIVITAMIN + DHA 28-0.8 & 200 MG Misc           topiramate 25 MG tablet    TOPAMAX    140 tablet    Take 2 tablets (50 mg) by mouth 2 times daily    Morbid obesity (H)

## 2018-06-21 NOTE — PROGRESS NOTES
Reed (Jen) EVELIN Brewer is a 37 year-old female presents today for return bariatric nutrition consultation.  Patient referred by Dr Huang (11/17/17).  Patient is interested in a gastric sleeve.  This is patient's 6th consecutive visit of six required.  Pt referred by Renetta LINK (2/15/18).      Coordination Note:   (6/21/18) Updated task list with completed RD visits.     Anthropometrics   Initial weight: 289 lbs(11/8/17)  Current weight: 283.4 lbs with BMI of 51.17 (-+0.7 lbs in the past month, -5.6 lbs since initial)     Goal weight for bariatric surgery of 279 lbs or less.    Goal to loose 50 pounds for hernia surgery (goal 240 pounds)    Nutrition history  Dislikes fruit and vegetables.    Progress with Previous Goals:  1) Follow the meal plan below. - Not meeting consistently this past month due to running out of protein shake.  Pt plans to restart protein shake for 1-2 meals/day tomorrow.  2) Eat slowly (20-30 minutes per meal), chewing foods well (25 chews per bite)- Meeting.   3) Continue to avoid calorie-containing beverages.- Meeting.   4) Work on  fluids from meals by 30 min. - Meeting.     Follow the Modified Liquid Diet for weight loss: - Meeting.   Breakfast: Protein Shake  Lunch: 2 turkey breast with cheese sandwiches (no cardenas)  Supper: 3 oz lean protein + non-starchy vegetables (Lean Cuisine or Smart Ones)  Snack: non-starchy vegetables (no calorie-containing dips/condiments)  Beverages: at least 48-64 oz water between meals daily    Patient is unable to do any physical activity at this time due to hernia issue.    Nutrition Prescription  Volumetric diet (per MD)    Nutrition Diagnosis  Obesity r/t long history of self-monitoring deficit and excessive energy intake aeb BMI >30. -continues    Nutrition Intervention  Intervention Provided/Education Provided: Discussed potential reasoning for lack of weight loss over the past month.  Reviewed modified liquid diet and importance of  being consistent with it now.  Pt stated she feels confident she will be able to lose the weight prior to surgery by following the goals set previously.  Gave support and encouragement.  Provided instruction on bariatric clear and low-fat full liquid diets.  Provided the following handouts: Diet Guidelines for Bariatric Surgery, Sources of Protein, Keeping Track of Your Fluids, list of recommended vitamin/mineral supplementation after SG surgery and RD contact information.  *Pt plans to use Premier Protein shake due to it being more in her budget than the one available in clinic.  Patient Understanding: good  Expected Compliance: good    Pre-op Goals  1) Follow the meal plan below.  2) Eat slowly (20-30 minutes per meal), chewing foods well (25 chews per bite)  3) Continue to avoid calorie-containing beverages.  4) Work on  fluids from meals by 30 min.     Follow the Modified Liquid Diet for weight loss:  Breakfast: Protein Shake  Lunch: 2 turkey breast with cheese sandwiches (no cardenas)  Supper: 3 oz lean protein + non-starchy vegetables (Lean Cuisine or Smart Ones) or Protein Shake  Snack: non-starchy vegetables (no calorie-containing dips/condiments)  Beverages: at least 48-64 oz water between meals daily    Post-op Goals:   1. Follow the bariatric post-op diet advancement schedule:  - Bariatric clear liquid diet through post-op day 1 (while in the hospital).  - Bariatric low-fat full liquid diet on post-op days 2 through 13 (2 weeks).  2. Sip on 48-64 oz (or greater) fluids daily, recording intake to help stay on-track.  - Drink at least 2 oz of fluid every 30 min.  Follow-Up: 1 month    Time spent with patient: 30 minutes.  Salena Blanton, MS, RDN, LDN, CLT  Pager: [phone]

## 2018-06-21 NOTE — PATIENT INSTRUCTIONS
Pre-op Goals  1) Follow the meal plan below.  2) Eat slowly (20-30 minutes per meal), chewing foods well (25 chews per bite)  3) Continue to avoid calorie-containing beverages.  4) Work on  fluids from meals by 30 min.     Follow the Modified Liquid Diet for weight loss:  Breakfast: Protein Shake  Lunch: 2 turkey breast with cheese sandwiches (no cardenas)  Supper: 3 oz lean protein + non-starchy vegetables (Lean Cuisine or Smart Ones) or Protein Shake  Snack: non-starchy vegetables (no calorie-containing dips/condiments)  Beverages: at least 48-64 oz water between meals daily    Post-op Goals:   1. Follow the bariatric post-op diet advancement schedule:  - Bariatric clear liquid diet through post-op day 1 (while in the hospital).  - Bariatric low-fat full liquid diet on post-op days 2 through 13 (2 weeks).  2. Sip on 48-64 oz (or greater) fluids daily, recording intake to help stay on-track.  - Drink at least 2 oz of fluid every 30 min.

## 2018-06-21 NOTE — NURSING NOTE
"(   Chief Complaint   Patient presents with     RECHECK     PBS needs pre op teaching    )    ( Weight: 283 lb 6.4 oz )  ( Height: 5' 2.4\" )  ( BMI (Calculated): 51.28 )  ( Initial Weight: 287 lb 4.8 oz )  ( Cumulative weight loss (lbs): 3.9 )  ( Last Visits Weight: 287 lb 4.8 oz )  ( Wt change since last visit (lbs): -3.9 )  (   )  (   )    ( BP: 110/66 )  (   )  ( Temp: 97.6  F (36.4  C) )  ( Temp src: Oral )  ( Pulse: 87 )  (   )  ( SpO2: 99 % )    (   Patient Active Problem List   Diagnosis     Ventral hernia without obstruction or gangrene     Morbid obesity (H)     Depression    )  (   Current Outpatient Prescriptions   Medication Sig Dispense Refill     acetaminophen (TYLENOL) 325 MG tablet Take 650 mg by mouth       Cholecalciferol (D3-1000) 1000 UNITS CAPS        FLUoxetine (PROZAC) 40 MG capsule        hydrOXYzine (ATARAX) 50 MG tablet Take  mg by mouth       levothyroxine (SYNTHROID/LEVOTHROID) 75 MCG tablet        phentermine 15 MG capsule Take 1 capsule (15 mg) by mouth every morning 30 capsule 3     Prenatal MV-Min-Fe Fum-FA-DHA (PRENATAL MULTIVITAMIN + DHA) 28-0.8 & 200 MG MISC        topiramate (TOPAMAX) 25 MG tablet Take 2 tablets (50 mg) by mouth 2 times daily 140 tablet 0    )  ( Diabetes Eval:    )    ( Pain Eval:  Data Unavailable )    ( Wound Eval:       )    (   History   Smoking Status     Former Smoker     Packs/day: 1.00     Years: 20.00     Types: Cigarettes     Start date: 1/1/1998     Quit date: 5/8/2016   Smokeless Tobacco     Never Used    )    ( Signed By:  Bee Manzano; June 21, 2018; 10:31 AM )    "

## 2018-06-21 NOTE — PROGRESS NOTES
"Dear Dr. Kramer:      I had the pleasure of meeting with your patient Brianna Brewer in our weight loss surgery office.  This patient is a 37 year old female who has been undergoing our thorough preoperative screening process in anticipation of potential bariatric surgery.    At initial evaluation we recorded Brianna Brewer's height as 1.58 m (5' 2.21\"), weight as 131.3 kg (289 lb 8 oz), and BMI as 52.6 kg/m2.     Her elevated BMI, unfortunately, is associated with abdominal wall hernia.  She has a hernia related to dehiscence of CSection incision.    She is interested in abdominal wall re-construction and needs to lose weight in order to facilitate that.    She has been unsuccessful with other methods of permanent weight loss and suffers from multiple weight related medical conditions.  Due to lack of success in achieving weight loss through other methods, she is interested in undergoing bariatric surgery.    PREVIOUS WEIGHT LOSS ATTEMPTS:     2/12/2018   I have tried the following methods to lose weight Watching portions or calories, Exercise, Weight Watchers, Atkins type diet (low carb/high protein), OTC Medications, Prescription Medications       CO-MORBIDITIES OF OBESITY INCLUDE:     2/12/2018   I have the following co-morbidities associated with obesity: GERD (Reflux)   Are you taking daily medication for heartburn, acid reflux, or GERD (acid reflux disease)? Yes       VITALS:  /66  Pulse 87  Temp 97.6  F (36.4  C) (Oral)  Ht 1.585 m (5' 2.4\")  Wt 128.5 kg (283 lb 6.4 oz)  SpO2 99%  BMI 51.17 kg/m2    PE:  GENERAL: Alert and oriented x3. NAD  HEENT exam: Sclerae not icteric. Hearing good. Head normocephalic and atraumatic.   CARDIOVASCULAR: No JVD  RESPIRATORY: Breathing unlabored  GASTROINTESTINAL: Obese; has abdominal wall hernia.  LOWER EXTREMITIES: no deformities  MUSCULOSKELETAL: Normal gait, Moves all 4 extremities equal and strong  NEUROLOGIC: no gross defect  SKIN: warm and dry " to touch     I reviewed abdominal CT scan, which shows large abdominal wall hernia defect with fat and small bowel within.  Large enough to not be an active risk of obstruction.    In summary, she has undergone an appropriate medical evaluation, dietitian evaluation, as well as psychologic screening. The patient appears to be an appropriate candidate for bariatric surgery.    In the office today, I discussed the laparoscopic gastric sleeve surgery.  Risks, benefits and anticipated outcomes were outlined including the risk of death, staple line leak (1-2%), PE, DVT, ulcer, worsening GERD, N/V, stricture, hernia, wound infection, weight regain, and vitamin deficiencies. This patient has a good chance of sustaining permanent weight loss due to this procedure.  This should also allow improvement if not resolution of his/her weight related medical conditions.    At present we are going to present your patient's file for prior authorization to insurance.  Pending prior authorization, I anticipate a surgical date in the near future.  We will keep you updated on any progress.  If you have questions regarding care please feel free to contact me.  Total time spent in the clinic was 15 minutes with greater than 50% in face-to-face consultation.        Sincerely,    Zoran Swanson    Please route or send letter to:  Primary Care Provider (PCP) and Referring Provider

## 2018-06-22 DIAGNOSIS — Z98.84 BARIATRIC SURGERY STATUS: Primary | ICD-10-CM

## 2018-06-22 RX ORDER — TOPIRAMATE 25 MG/1
50 TABLET, FILM COATED ORAL 2 TIMES DAILY
Qty: 140 TABLET | Refills: 0 | OUTPATIENT
Start: 2018-06-22

## 2018-06-23 NOTE — TELEPHONE ENCOUNTER
Received refill request for topiramate (TOPAMAX   Patient needs appointment scheduled prior to any refills. Clinic Coordinator notified and will follow up with the patient as appropriate. The pharmacy has been notified that the medication will not be refilled prior to an appointment being scheduled.    Scheduling has been notified to contact the pt for appointment.

## 2018-07-10 ENCOUNTER — ANESTHESIA EVENT (OUTPATIENT)
Dept: SURGERY | Facility: CLINIC | Age: 38
End: 2018-07-10
Payer: MEDICAID

## 2018-07-10 ENCOUNTER — APPOINTMENT (OUTPATIENT)
Dept: SURGERY | Facility: CLINIC | Age: 38
End: 2018-07-10
Payer: MEDICAID

## 2018-07-10 ENCOUNTER — ALLIED HEALTH/NURSE VISIT (OUTPATIENT)
Dept: SURGERY | Facility: CLINIC | Age: 38
End: 2018-07-10
Payer: MEDICAID

## 2018-07-10 ENCOUNTER — OFFICE VISIT (OUTPATIENT)
Dept: SURGERY | Facility: CLINIC | Age: 38
End: 2018-07-10
Payer: MEDICAID

## 2018-07-10 VITALS
TEMPERATURE: 97.5 F | OXYGEN SATURATION: 97 % | SYSTOLIC BLOOD PRESSURE: 123 MMHG | WEIGHT: 287.4 LBS | DIASTOLIC BLOOD PRESSURE: 79 MMHG | HEIGHT: 62 IN | HEART RATE: 86 BPM | BODY MASS INDEX: 52.89 KG/M2 | RESPIRATION RATE: 18 BRPM

## 2018-07-10 DIAGNOSIS — Z98.84 BARIATRIC SURGERY STATUS: ICD-10-CM

## 2018-07-10 DIAGNOSIS — Z01.818 PREOP EXAMINATION: Primary | ICD-10-CM

## 2018-07-10 DIAGNOSIS — R55 VASOVAGAL RESPONSE: ICD-10-CM

## 2018-07-10 LAB
ALBUMIN UR-MCNC: NEGATIVE MG/DL
APPEARANCE UR: CLEAR
BILIRUB UR QL STRIP: NEGATIVE
COLOR UR AUTO: ABNORMAL
GLUCOSE UR STRIP-MCNC: NEGATIVE MG/DL
HGB UR QL STRIP: ABNORMAL
KETONES UR STRIP-MCNC: NEGATIVE MG/DL
LEUKOCYTE ESTERASE UR QL STRIP: NEGATIVE
MUCOUS THREADS #/AREA URNS LPF: PRESENT /LPF
NITRATE UR QL: NEGATIVE
PH UR STRIP: 6 PH (ref 5–7)
RBC #/AREA URNS AUTO: 1 /HPF (ref 0–2)
SOURCE: ABNORMAL
SP GR UR STRIP: 1.01 (ref 1–1.03)
SQUAMOUS #/AREA URNS AUTO: <1 /HPF (ref 0–1)
UROBILINOGEN UR STRIP-MCNC: 0 MG/DL (ref 0–2)
WBC #/AREA URNS AUTO: 2 /HPF (ref 0–5)

## 2018-07-10 RX ORDER — OXYCODONE HYDROCHLORIDE 5 MG/1
5 TABLET ORAL EVERY 8 HOURS PRN
Refills: 0 | COMMUNITY
Start: 2018-07-06

## 2018-07-10 ASSESSMENT — LIFESTYLE VARIABLES: TOBACCO_USE: 1

## 2018-07-10 NOTE — PATIENT INSTRUCTIONS
Preparing for Your Surgery      Name:  Brianna Brewer   MRN:  [id number]   :  1980   Today's Date:  7/10/2018     Arriving for surgery:  Surgery date:  18  Arrival time:  8:40 am    Please come to:   [facility] [address]  [address] [place]  [address]    -   parking is available in front of the hospital from 5:15 am to 8:00 pm    -  Stop at the Information Desk in the lobby    -   Inform the information person that you are here for surgery. An escort to 3C will be provided. If you would not like an escort, please proceed to 3C on the 3rd floor. [phone]     -  Bring your ID and insurance card.    What can I eat or drink?  -  Follow Bariatric Clinic instructions for Bowel prep and Clear Liquid diet.        -  Continue clear liquids until 12 midnight day of surgery.    -  Sips of water only with medication from 12 midnight until 7:40 am.        -  Nothing to drink after 7:40 am.    Which medicines can I take?       -  Do not bring your own medications to the hospital.        -  Follow Bariatric Clinic instructions regarding Ibuprofen. If no instructions given, NO Ibuprofen the day prior to surgery.         -  Hold Vitamins 7 days prior to surgery.        -  Hold Phentermine 2 days prior to surgery. Last dose 18.    -  Do NOT take these medications in the morning, the day of surgery:  Fiber,     -  Please take these medications the morning of surgery:  Topiramate (Topamax), Fluoxetine (Prozac), Levothyroxine      Acetaminophen (Tylenol) if needed    How do I prepare myself?  -  Take two showers: one the night before surgery; and one the morning of surgery.         Use Scrubcare or Hibiclens to wash from neck down.  You may use your own shampoo and conditioner. No other hair products.   -  Do NOT use lotion, powder, colognes, deodorant, or antiperspirant the day of your surgery.  -  Do NOT wear any makeup, fingernail polish or jewelry.    -   Begin using Incentive Spirometer 1 week prior to surgery.  Use 4 times per day, up to 5-10 breaths each time.  Bring Incentive Spirometer to hospital.    Questions or Concerns:  If you have questions or concerns prior to your surgery, call [phone]. (Mon - Fri   8 am- 5:30 pm)  Questions after surgery, contact your Surgeons office.      AFTER YOUR SURGERY  Breathing exercises   Breathing exercises help you recover faster. Take deep breaths and let the air out slowly. This will:     Help you wake up after surgery.    Help prevent complications like pneumonia.  Preventing complications will help you go home sooner.   Nausea and vomiting   You may feel sick to your stomach after surgery; if so, let your nurse know.    Pain control:  After surgery, you may have pain. Our goal is to help you manage your pain. Pain medicine will help you feel comfortable enough to do activities that will help you heal.  These activities may include breathing exercises, walking and physical therapy.   To help your health care team treat your pain we will ask: 1) If you have pain  2) where it is located 3) describe your pain in your words  Methods of pain control include medications given by mouth, vein or by nerve block for some surgeries.  Sequential Compression Device (SCD) or Pneumo Boots:  You may need to wear SCD S on your legs or feet. These are wraps connected to a machine that pumps in air and releases it. The repeated pumping helps prevent blood clots from forming.   Using an Incentive Spirometer    An incentive spirometer is a device that helps you do deep breathing exercises. These exercises expand your lungs, aid in circulation, and help prevent pneumonia. Deep breathing exercises also help you breathe better and improve the function of your lungs by:    Keeping your lungs clear    Strengthening your breathing muscles    Helping prevent respiratory complications or problems  The incentive spirometer gives you a way to take  an active part in your care. A nurse or therapist will teach you breathing exercises. To do these exercises, you will breathe in through your mouth and not your nose. The incentive spirometer only works correctly if you breathe in through your mouth.    Steps to clear lungs  Step 1. Exhale normally. Then, inhale normally.    Relax and breathe out.  Step 2. Place your lips tightly around the mouthpiece.    Make sure the device is upright and not tilted.  Step 3. Inhale as much air as you can through the mouthpiece (don't breath through your nose).    Inhale slowly and deeply.    Hold your breath long enough to keep the balls or disk raised for at least 3 to 5 seconds, or as instructed by your healthcare provider.  Step 4. Repeat the exercise regularly.

## 2018-07-10 NOTE — MR AVS SNAPSHOT
After Visit Summary   7/10/2018    Brianna Brewer    MRN: [id number]           Patient Information     Date Of Birth          1980        Visit Information        Provider Department      7/10/2018 12:00 PM Rn, [facility]        Care Instructions    Preparing for Your Surgery      Name:  Brianna Brewer   MRN:  [id number]   :  1980   Today's Date:  7/10/2018     Arriving for surgery:  Surgery date:  18  Arrival time:  8:40 am    Please come to:   [facility] [address]  [address] [place]  [address]    -   parking is available in front of the hospital from 5:15 am to 8:00 pm    -  Stop at the Information Desk in the lobby    -   Inform the information person that you are here for surgery. An escort to 3C will be provided. If you would not like an escort, please proceed to 3C on the 3rd floor. [phone]     -  Bring your ID and insurance card.    What can I eat or drink?  -  Follow Bariatric Clinic instructions for Bowel prep and Clear Liquid diet.        -  Continue clear liquids until 12 midnight day of surgery.    -  Sips of water only with medication from 12 midnight until 7:40 am.        -  Nothing to drink after 7:40 am.    Which medicines can I take?       -  Do not bring your own medications to the hospital.        -  Follow Bariatric Clinic instructions regarding Ibuprofen. If no instructions given, NO Ibuprofen the day prior to surgery.         -  Hold Vitamins 7 days prior to surgery.        -  Hold Phentermine 2 days prior to surgery. Last dose 18.    -  Do NOT take these medications in the morning, the day of surgery:  Fiber,     -  Please take these medications the morning of surgery:  Topiramate (Topamax), Fluoxetine (Prozac), Levothyroxine      Acetaminophen (Tylenol) if needed    How do I prepare myself?  -  Take two showers: one the night before surgery; and one  the morning of surgery.         Use Scrubcare or Hibiclens to wash from neck down.  You may use your own shampoo and conditioner. No other hair products.   -  Do NOT use lotion, powder, colognes, deodorant, or antiperspirant the day of your surgery.  -  Do NOT wear any makeup, fingernail polish or jewelry.    -  Begin using Incentive Spirometer 1 week prior to surgery.  Use 4 times per day, up to 5-10 breaths each time.  Bring Incentive Spirometer to hospital.    Questions or Concerns:  If you have questions or concerns prior to your surgery, call [phone]. (Mon - Fri   8 am- 5:30 pm)  Questions after surgery, contact your Surgeons office.      AFTER YOUR SURGERY  Breathing exercises   Breathing exercises help you recover faster. Take deep breaths and let the air out slowly. This will:     Help you wake up after surgery.    Help prevent complications like pneumonia.  Preventing complications will help you go home sooner.   Nausea and vomiting   You may feel sick to your stomach after surgery; if so, let your nurse know.    Pain control:  After surgery, you may have pain. Our goal is to help you manage your pain. Pain medicine will help you feel comfortable enough to do activities that will help you heal.  These activities may include breathing exercises, walking and physical therapy.   To help your health care team treat your pain we will ask: 1) If you have pain  2) where it is located 3) describe your pain in your words  Methods of pain control include medications given by mouth, vein or by nerve block for some surgeries.  Sequential Compression Device (SCD) or Pneumo Boots:  You may need to wear SCD S on your legs or feet. These are wraps connected to a machine that pumps in air and releases it. The repeated pumping helps prevent blood clots from forming.   Using an Incentive Spirometer    An incentive spirometer is a device that helps you do deep breathing exercises. These exercises expand your lungs, aid in  circulation, and help prevent pneumonia. Deep breathing exercises also help you breathe better and improve the function of your lungs by:    Keeping your lungs clear    Strengthening your breathing muscles    Helping prevent respiratory complications or problems  The incentive spirometer gives you a way to take an active part in your care. A nurse or therapist will teach you breathing exercises. To do these exercises, you will breathe in through your mouth and not your nose. The incentive spirometer only works correctly if you breathe in through your mouth.    Steps to clear lungs  Step 1. Exhale normally. Then, inhale normally.    Relax and breathe out.  Step 2. Place your lips tightly around the mouthpiece.    Make sure the device is upright and not tilted.  Step 3. Inhale as much air as you can through the mouthpiece (don't breath through your nose).    Inhale slowly and deeply.    Hold your breath long enough to keep the balls or disk raised for at least 3 to 5 seconds, or as instructed by your healthcare provider.  Step 4. Repeat the exercise regularly.                  Follow-ups after your visit        Your next 10 appointments already scheduled     Jul 10, 2018 12:00 PM CDT   (Arrive by 11:45 AM)   PAC RN ASSESSMENT with Otf Pac Rn   [facility] ([facility])    [address] [place] [id number] [phone]            Jul 10, 2018 12:30 PM CDT   (Arrive by 12:15 PM)   PAC Anesthesia Consult with Otf Pac Anesthesiologist   [facility] [place] ([facility])    [address] [place] [id number]   [phone]            Jul 24, 2018   Procedure with Zoran Swanson MD   [facility], [place], Same Day Surgery (--)    [address] [id number] [phone]              Future tests that were ordered for you today     Open Future Orders        Priority  Expected Expires Ordered    UA reflex to Microscopic and Culture Routine 7/10/2018 8/9/2018 7/10/2018            Who to contact     Please call your clinic at [phone] to:    Ask questions about your health    Make or cancel appointments    Discuss your medicines    Learn about your test results    Speak to your doctor            Additional Information About Your Visit        [employer] Information     [employer] gives you secure access to your electronic health record. If you see a primary care provider, you can also send messages to your care team and make appointments. If you have questions, please call your primary care clinic.  If you do not have a primary care provider, please call [phone] and they will assist you.      [employer] is an electronic gateway that provides easy, online access to your medical records. With [employer], you can request a clinic appointment, read your test results, renew a prescription or communicate with your care team.     To access your existing account, please contact your [facility] or call [phone] for assistance.        Care EveryWhere ID     This is your Care EveryWhere ID. This could be used by other organizations to access your [facility] medical records  [id number]        Your Vitals Were     Last Period                   07/10/2018 (Exact Date)            Blood Pressure from Last 3 Encounters:   07/10/18 123/79   06/21/18 110/66   02/15/18 125/76    Weight from Last 3 Encounters:   07/10/18 130.4 kg (287 lb 6.4 oz)   06/21/18 128.5 kg (283 lb 6.4 oz)   05/17/18 128.2 kg (282 lb 11.2 oz)              Today, you had the following     No orders found for display       Primary Care Provider Office Phone # Fax #    Yaya Kramer [phone]       [facility] [address] [address]        Equal Access to Services     JANICE HARDIN AH: julian Mujica qaybta kaalmada  cydney minordebidebbie baerChristopheraan ah. [facility] [phone].    ATENCIÓN: Si fedela donavan, tiene a parish disposición servicios gratuitos de asistencia lingüística. Des al [phone].    We comply with applicable federal civil rights laws and [place] laws. We do not discriminate on the basis of race, color, national origin, age, disability, sex, sexual orientation, or gender identity.            Thank you!     Thank you for choosing [facility]  for your care. Our goal is always to provide you with excellent care. Hearing back from our patients is one way we can continue to improve our services. Please take a few minutes to complete the written survey that you may receive in the mail after your visit with us. Thank you!             Your Updated Medication List - Protect others around you: Learn how to safely use, store and throw away your medicines at www.disposemymeds.org.          This list is accurate as of 7/10/18 11:33 AM.  Always use your most recent med list.                   Brand Name Dispense Instructions for use Diagnosis    D3-1000 1000 units Caps      Take 3,000 Units by mouth every morning        FIBER PO      Take 5 capsules by mouth every morning        FLUoxetine 40 MG capsule    PROzac     Take 40 mg by mouth every morning        hydrOXYzine 50 MG tablet    ATARAX     Take  mg by mouth as needed        levothyroxine 75 MCG tablet    SYNTHROID/LEVOTHROID     Take 75 mcg by mouth every morning        oxyCODONE IR 5 MG tablet    ROXICODONE     Take 5 mg by mouth every 8 hours        phentermine 15 MG capsule     30 capsule    Take 1 capsule (15 mg) by mouth every morning    Morbid obesity (H)       topiramate 25 MG tablet    TOPAMAX    140 tablet    Take 2 tablets (50 mg) by mouth 2 times daily    Morbid obesity (H)

## 2018-07-10 NOTE — OR NURSING
PAC visit Date 7/10/2018   Patient Goal Weight 279 pounds  Actual Weight 287 pounds 6.4 ounces   Met goal weight  No

## 2018-07-10 NOTE — ANESTHESIA PREPROCEDURE EVALUATION
"  Anesthesia Evaluation     . Pt has had prior anesthetic. Type: General and MAC    History of anesthetic complications    Reported \"vasovagal\" response with C section in 2017, and early with regional       ROS/MED HX    ENT/Pulmonary: Comment: 20 pack year smoking history    (+)TOMEKA risk factors obese, tobacco use, Past use , . .    Neurologic:  - neg neurologic ROS     Cardiovascular:  - neg cardiovascular ROS   (+) ----. : . . . :. . Previous cardiac testing date:results:date: results:ECG reviewed date:7/10/18 results:NSR date: results:          METS/Exercise Tolerance: Comment: Very little activity tolerance at this time due to abdominal pain 3 - Able to walk 1-2 blocks without stopping   Hematologic:     (+) History of Transfusion no previous transfusion reaction -      Musculoskeletal:  - neg musculoskeletal ROS       GI/Hepatic:  - neg GI/hepatic ROS       Renal/Genitourinary:  - ROS Renal section negative       Endo:     (+) thyroid problem hypothyroidism, Obesity, .      Psychiatric:     (+) psychiatric history anxiety and depression      Infectious Disease:  - neg infectious disease ROS       Malignancy:      - no malignancy   Other:    (+) C-spine cleared: N/A, no H/O Chronic Pain,no other significant disability                    Physical Exam  Normal systems: dental    Airway   Mallampati: III  TM distance: >3 FB  Neck ROM: limited    Dental     Cardiovascular   Rhythm and rate: regular and normal      Pulmonary    breath sounds clear to auscultation    Other findings: For further details of assessment, testing, and physical exam please see H and P completed on same date.           PAC Discussion and Assessment    ASA Classification: 3  Case is suitable for:   Anesthetic techniques and relevant risks discussed: GA  Invasive monitoring and risk discussed: No  Types:   Possibility and Risk of blood transfusion discussed: No  NPO instructions given:   Additional anesthetic preparation and risks discussed: "   Needs early admission to pre-op area:   Other:     PAC Resident/NP Anesthesia Assessment:  Brianna Brewer is a 37 year old female scheduled to undergo lap gastric sleeve on 7/24/18 by Dr. Swanson. She has the following specific operative considerations:   - RCRI : No serious cardiac risks.    - VTE risk: 0.5%  - TOMEKA # of risks 2/8 = Low risk  - Risk of PONV score = 3.  If > 2, anti-emetic intervention recommended. If 3 or > anti emetic intervention recommended with two or more meds       --Obesity. BMI 52. 130 kg. Truncal distribution. Able to lay flat. Final decisions regarding airway by Anesthesia on DOS. Will take Topamax but hold Phentermine for two days prior to surgery.               --Symptomatic abdominal hernia. May take Oxycodone on DOS.    --HTN. No meds at this time. No cardiac symptoms. EKG above. Limited activity at this time due to abdominal pain from hernia. Due to patient's history of vasovagal response with GA in 2017, a preop cardiac evaluation was arranged for 7/14/18.    --Former smoker. 20 pack years. Denies pulmonary symptoms.   --Hypothyroidism. Will take Synthroid on DOS.   --Anxiety/Depression. Will take Fluoxetine on DOS.   --History of blood transfusions.       Patient was discussed with Dr Tolbert.  ADDENDUM: Patient evaluated by Dr. Taylor from Cardiology and felt safe to proceed to surgery with no further testing.       Reviewed and Signed by PAC Mid-Level Provider/Resident  Mid-Level Provider/Resident: MAGDALENO Coffey, CNS  Date: 7/10/18  Time: 10:52am    Attending Anesthesiologist Anesthesia Assessment:        Anesthesiologist:   Date:   Time:   Pass/Fail:   Disposition:     PAC Pharmacist Assessment:        Pharmacist:   Date:   Time:      Anesthesia Plan      History & Physical Review  History and physical reviewed and following examination; no interval change.    ASA Status:  3 .    NPO Status:  > 6 hours    Plan for General and ETT with Intravenous and Propofol  induction. Maintenance will be Balanced.    PONV prophylaxis:  Dexamethasone or Solumedrol and Ondansetron (or other 5HT-3)  Additional equipment: 2nd IV      Postoperative Care  Postoperative pain management:  IV analgesics and Multi-modal analgesia.      Consents  Anesthetic plan, risks, benefits and alternatives discussed with:  Patient..                          .

## 2018-07-10 NOTE — H&P
"  Pre-Operative H & P     CC:  Preoperative exam to assess for increased cardiopulmonary risk while undergoing surgery and anesthesia.    Date of Encounter: 7/10/2018  Primary Care Physician:  Yaya Kramer  Reason for visit: Morbid obesity  HPI  Brianna Brewer is a 37 year old female who presents for pre-operative H & P in preparation for lap gastric sleeve with Dr. Swanson on 18 at [facility]. History is obtained from the patient.    Patient who recently completed evaluation in consideration of bariatric surgery. Her issues with obesity began many years ago and her multiple attempts at weight loss have not had lasting success. Her history is otherwise significant for HTN, hypothyroidism, anxiety and depression. She also has an abdominal wall hernia that she hopes to have repaired after significant weight loss.   Past Medical History  Past Medical History:   Diagnosis Date     Anxiety      Chronic diarrhea 2017    Contracted c-diff during surgery     Depressive disorder      History of blood transfusion     3 units of blood during      Hypertension 2017    During pregnancy pre-eclampsia     Hypothyroidism      Morbid obesity with BMI of 50.0-59.9, adult (H)      Preeclampsia      Vasovagal response 2017     Ventral hernia        Past Surgical History  Past Surgical History:   Procedure Laterality Date     ABDOMEN SURGERY  2017          SECTION  2017     COLONOSCOPY  2017       Hx of Blood transfusions/reactions: Yes with C section. No known reactions.      Hx of abnormal bleeding or anti-platelet use: Denies.     Menstrual history: Patient's last menstrual period was 07/10/2018 (exact date).    Steroid use in the last year: Denies.     Personal or FH with difficulty with Anesthesia:  Patient and family report history of \"vasovagal response\" during GA for C section in 2017. Required brief ICU stay afterwards. Returned to " OR for wound issue afterward with no issues. History of similar vasovagal response with regional 10 years ago with another delivery. Some records available in Media.    Prior to Admission Medications  Current Outpatient Prescriptions   Medication Sig Dispense Refill     Cholecalciferol (D3-1000) 1000 UNITS CAPS Take 3,000 Units by mouth every morning        FIBER PO Take 5 capsules by mouth every morning       FLUoxetine (PROZAC) 40 MG capsule Take 40 mg by mouth every morning        levothyroxine (SYNTHROID/LEVOTHROID) 75 MCG tablet Take 75 mcg by mouth every morning        phentermine 15 MG capsule Take 1 capsule (15 mg) by mouth every morning 30 capsule 3     topiramate (TOPAMAX) 25 MG tablet Take 2 tablets (50 mg) by mouth 2 times daily 140 tablet 0     hydrOXYzine (ATARAX) 50 MG tablet Take  mg by mouth as needed        oxyCODONE IR (ROXICODONE) 5 MG tablet Take 5 mg by mouth every 8 hours  0       Allergies  No Known Allergies    Social History  Social History     Social History     Marital status: Single     Spouse name: N/A     Number of children: N/A     Years of education: N/A     Occupational History     Not on file.     Social History Main Topics     Smoking status: Former Smoker     Packs/day: 1.00     Years: 20.00     Types: Cigarettes     Start date: 1/1/1998     Quit date: 5/8/2016     Smokeless tobacco: Never Used     Alcohol use Yes     Drug use: No     Sexual activity: Not Currently     Partners: Male     Birth control/ protection: Abstinence     Other Topics Concern     Not on file     Social History Narrative       Family History  Family History   Problem Relation Age of Onset     Thyroid Disease Mother      Obesity Mother      Thyroid Disease Maternal Grandmother      Cerebrovascular Disease Father        Review of Systems  ROS/MED HX    The complete review of systems is negative other than noted in the HPI or here.   ENT/Pulmonary: Comment: 20 pack year smoking history    (+)tobacco use,  "Past use , . .    Neurologic:  - neg neurologic ROS     Cardiovascular:  - neg cardiovascular ROS   (+) ----. : . . . :. . Previous cardiac testing date:results:date: results:ECG reviewed date:2014 results:NSR date: results:          METS/Exercise Tolerance:  2   Hematologic:     (+) History of Transfusion no previous transfusion reaction -      Musculoskeletal:  - neg musculoskeletal ROS       GI/Hepatic:  - neg GI/hepatic ROS       Renal/Genitourinary: Neg     Endo:     (+) thyroid problem hypothyroidism, Obesity, .      Psychiatric:     (+) psychiatric history anxiety, depression     Infectious Disease:  - neg infectious disease ROS       Malignancy:      - no malignancy   Other:    (+) C-spine cleared: N/A, no H/O Chronic Pain,no other significant disability      Physical Exam      Airway   Mallampati: II  TM distance: >3 FB  Neck ROM: limited    Temp: 97.5  F (36.4  C) Temp src: Oral BP: 123/79 Pulse: 86   Resp: 18 SpO2: 97 %         287 lbs 6.4 oz  5' 2.4\"   Body mass index is 51.89 kg/(m^2).       Physical Exam  Constitutional: Awake, alert, cooperative, no apparent distress, and appears stated age. Accompanied by mother and her children.  Eyes: Pupils equal, round and reactive to light, extra ocular muscles intact, sclera clear, conjunctiva normal.  HENT: Normocephalic, oral pharynx with moist mucus membranes, good dentition. No goiter appreciated.   Respiratory: Clear to auscultation bilaterally, no crackles or wheezing. No cough or obvious dyspnea.  Cardiovascular: Regular rate and rhythm, normal S1 and S2, and no murmur noted.  Carotids +2, no bruits. No edema. Palpable pulses to radial  DP and PT arteries.   GI: Normal bowel sounds, soft, non-distended, non-tender, no masses palpated. Difficult exam due to obese abdomen.  Lymph/Hematologic: No cervical lymphadenopathy and no supraclavicular lymphadenopathy.  Genitourinary:  Deferred.   Skin: Warm and dry.  No rashes at anticipated surgical site. "   Musculoskeletal: Limited ROM of neck. There is no redness, warmth, or swelling of the joints. Gross motor strength is normal.    Neurologic: Awake, alert, oriented to name, place and time. Cranial nerves II-XII are grossly intact. Gait is normal.   Neuropsychiatric: Calm, cooperative. Normal affect.     Labs: (personally reviewed)  Lab Results   Component Value Date    WBC 6.9 02/15/2018     Lab Results   Component Value Date    RBC 4.95 02/15/2018     Lab Results   Component Value Date    HGB 13.9 02/15/2018     Lab Results   Component Value Date    HCT 42.5 02/15/2018     Lab Results   Component Value Date    MCV 86 02/15/2018     Lab Results   Component Value Date    MCH 28.1 02/15/2018     Lab Results   Component Value Date    MCHC 32.7 02/15/2018     Lab Results   Component Value Date    RDW 14.5 02/15/2018     Lab Results   Component Value Date     02/15/2018     Last Basic Metabolic Panel:  Lab Results   Component Value Date     02/15/2018      Lab Results   Component Value Date    POTASSIUM 4.1 02/15/2018     Lab Results   Component Value Date    CHLORIDE 109 02/15/2018     Lab Results   Component Value Date    MABLE 9.0 02/15/2018     Lab Results   Component Value Date    CO2 21 02/15/2018     Lab Results   Component Value Date    BUN 18 02/15/2018     Lab Results   Component Value Date    CR 0.94 02/15/2018     Lab Results   Component Value Date    GLC 84 02/15/2018     Lab Results   Component Value Date    AST 12 02/15/2018     Lab Results   Component Value Date    ALT 18 02/15/2018     No results found for: BILICONJ   Lab Results   Component Value Date    BILITOTAL 0.2 02/15/2018     Lab Results   Component Value Date    ALBUMIN 3.7 02/15/2018     Lab Results   Component Value Date    PROTTOTAL 8.0 02/15/2018      Lab Results   Component Value Date    ALKPHOS 84 02/15/2018     EKG: Personally reviewed 7/10/18   12/24/17 CT abdomen/pelvis  IMPRESSION: Stable large ventral hernia. Small hiatal  hernia. No acute inflammatory changes within the abdomen or pelvis.   Imaging reviewed by this provider    Outside records reviewed from: Care Everywhere    ASSESSMENT and PLAN  Brianna Brewer is a 37 year old female scheduled to undergo lap gastric sleeve on 7/24/18 by Dr. Swanson. She has the following specific operative considerations:   - RCRI : No serious cardiac risks.    - Anesthesia considerations:  Refer to PAC assessment in anesthesia records  - VTE risk: 0.5%  - TOMEKA # of risks 2/8 = Low risk  - Risk of PONV score = 3.  If > 2, anti-emetic intervention recommended. If 3 or > anti emetic intervention recommended with two or more meds       --Obesity. BMI 52. 130 kg. Truncal distribution. Able to lay flat. Final decisions regarding airway by Anesthesia on DOS. Will take Topamax but hold Phentermine for two days prior to surgery.    --Symptomatic abdominal hernia. May take Oxycodone on DOS.   --HTN. No meds at this time. No cardiac symptoms. EKG above. Limited activity at this time due to abdominal pain from hernia. Due to patient's history of vasovagal response with GA in 2017, a preop cardiac evaluation was arranged for 7/14/18.    --Former smoker. 20 pack years. Denies pulmonary symptoms.   --Hypothyroidism. Will take Synthroid on DOS.   --Anxiety/Depression. Will take Fluoxetine on DOS.   --History of blood transfusions.   Arrival time, NPO, shower and medication instructions provided by nursing staff today. Preparing For Your Surgery handout given.      Patient was discussed with Dr Tolbert.    ADDENDUM: Patient evaluated by Dr. Taylor from Cardiology and felt safe to proceed to surgery with no further testing.     MAGDALENO Mallory CNS  Preoperative Assessment Center  [facility]  Clinic and Surgery Center  Phone: [phone]  Fax: [phone]

## 2018-07-11 LAB — INTERPRETATION ECG - MUSE: NORMAL

## 2018-07-13 ENCOUNTER — TELEPHONE (OUTPATIENT)
Dept: SURGERY | Facility: CLINIC | Age: 38
End: 2018-07-13

## 2018-07-13 NOTE — TELEPHONE ENCOUNTER
Patient's OR date for 7/24/18 to be cancelled.  1. Not at goal weight of 279 lbs.  2. Letter of support from therapist not received.    Can get a new OR date when the above goals are met.  Patient to be notified.

## 2018-07-13 NOTE — TELEPHONE ENCOUNTER
----- Message from Esdras De Jesus sent at 7/13/2018  7:48 AM CDT -----  Regarding: RE: Goal weight not met  Unfortunately, I am going to have to call her to cancel her OR date, have never rec'd a letter from her therapist to submit with the prior authorization.   ----- Message -----     From: Renetta Rossi PA-C     Sent: 7/11/2018  11:54 AM       To: Yarelis Lundberg RN, Esdras De Jesus  Subject: FW: Goal weight not met                          Weight check?    ----- Message -----     From: Светлана Sanchez RN     Sent: 7/10/2018   5:44 PM       To: Renetta Rossi PA-C, #  Subject: Goal weight not met                              Brianna Brewer was seen today in PAC Clinic. She has not met her goal weight. Goal weight is 279 pounds. Her weight today was 287 pounds 6.4 ounces. Surgery with Dr. Swanson is 7-24-18.  Светлана Sanchez RN

## 2018-07-14 ENCOUNTER — OFFICE VISIT (OUTPATIENT)
Dept: CARDIOLOGY | Facility: CLINIC | Age: 38
End: 2018-07-14
Attending: INTERNAL MEDICINE
Payer: MEDICAID

## 2018-07-14 VITALS
DIASTOLIC BLOOD PRESSURE: 74 MMHG | HEART RATE: 94 BPM | OXYGEN SATURATION: 98 % | HEIGHT: 62 IN | WEIGHT: 287.3 LBS | SYSTOLIC BLOOD PRESSURE: 131 MMHG | BODY MASS INDEX: 52.87 KG/M2

## 2018-07-14 DIAGNOSIS — Z01.810 PRE-OPERATIVE CARDIOVASCULAR EXAMINATION: Primary | ICD-10-CM

## 2018-07-14 DIAGNOSIS — R55 VASOVAGAL RESPONSE: ICD-10-CM

## 2018-07-14 PROCEDURE — G0463 HOSPITAL OUTPT CLINIC VISIT: HCPCS | Mod: ZF

## 2018-07-14 PROCEDURE — 99203 OFFICE O/P NEW LOW 30 MIN: CPT | Mod: ZP | Performed by: INTERNAL MEDICINE

## 2018-07-14 ASSESSMENT — PAIN SCALES - GENERAL: PAINLEVEL: NO PAIN (0)

## 2018-07-14 NOTE — MR AVS SNAPSHOT
After Visit Summary   7/14/2018    Brianna Brewer    MRN: [id number]           Patient Information     Date Of Birth          1980        Visit Information        Provider Department      7/14/2018 10:00 AM EVELIN Taylor MD [facility]        Today's Diagnoses     Vasovagal response          Care Instructions    Patient Instructions:  It was a pleasure to see you in the cardiology clinic today.      If you have any questions, you can reach my nurse, Nasir Sawant, at [phone].  Press Option #1 for the [facility], and then press Option #3 for nursing.    We are encouraging the use of [employer] to communicate with your HealthCare Provider    Please follow up with Dr. Huerta as needed     Sincerely,    EVELIN Huerta MD     If you have an urgent need after hours (8:00 am to 4:30 pm) please call [phone] and ask for the cardiology fellow on call.                    Follow-ups after your visit        Your next 10 appointments already scheduled     Aug 28, 2018   Procedure with Zoran Swanson MD   [facility], [place], Same Day Surgery (--)    [address] [facility] [place] [phone]              Who to contact     If you have questions or need follow up information about today's clinic visit or your schedule please contact [facility] directly at [phone].  Normal or non-critical lab and imaging results will be communicated to you by MyChart, letter or phone within 4 business days after the clinic has received the results. If you do not hear from us within 7 days, please contact the clinic through MyChart or phone. If you have a critical or abnormal lab result, we will notify you by phone as soon as possible.  Submit refill requests through [employer] or call your pharmacy and they will forward the refill request to us. Please allow 3 business days for your refill to be completed.          Additional Information About Your Visit       "  MyChart Information     The [employer] gives you secure access to your electronic health record. If you see a primary care provider, you can also send messages to your care team and make appointments. If you have questions, please call your primary care clinic.  If you do not have a primary care provider, please call [phone] and they will assist you.        Care EveryWhere ID     This is your Care EveryWhere ID. This could be used by other organizations to access your [facility] medical records  [id number]        Your Vitals Were     Pulse Height Last Period Pulse Oximetry BMI (Body Mass Index)       94 1.585 m (5' 2.4\") 07/10/2018 (Exact Date) 98% 51.88 kg/m2        Blood Pressure from Last 3 Encounters:   07/14/18 131/74   07/10/18 123/79   06/21/18 110/66    Weight from Last 3 Encounters:   07/14/18 130.3 kg (287 lb 4.8 oz)   07/10/18 130.4 kg (287 lb 6.4 oz)   06/21/18 128.5 kg (283 lb 6.4 oz)              Today, you had the following     No orders found for display       Primary Care Provider Office Phone # Fax #    Yaya Kramer [phone]       [address] [address]        Equal Access to Services     JANICE HARDIN : Hadii jazmine ku hadasho Soomaali, waaxda luqadaha, qaybta kaalmada adeegyada, cydney cm haydarnell han . So [facility] [phone].    ATENCIÓN: Si habla español, tiene a parish disposición servicios gratuitos de asistencia lingüística. Llame al [phone].    We comply with applicable federal civil rights laws and [place] laws. We do not discriminate on the basis of race, color, national origin, age, disability, sex, sexual orientation, or gender identity.            Thank you!     Thank you for choosing [facility]  for your care. Our goal is always to provide you with excellent care. Hearing back from our patients is one way we can continue to improve our services. Please take a few minutes to complete the written " survey that you may receive in the mail after your visit with us. Thank you!             Your Updated Medication List - Protect others around you: Learn how to safely use, store and throw away your medicines at www.disposemymeds.org.          This list is accurate as of 7/14/18 10:06 AM.  Always use your most recent med list.                   Brand Name Dispense Instructions for use Diagnosis    D3-1000 1000 units Caps      Take 3,000 Units by mouth every morning        FIBER PO      Take 5 capsules by mouth every morning        FLUoxetine 40 MG capsule    PROzac     Take 40 mg by mouth every morning        hydrOXYzine 50 MG tablet    ATARAX     Take  mg by mouth as needed        levothyroxine 75 MCG tablet    SYNTHROID/LEVOTHROID     Take 75 mcg by mouth every morning        oxyCODONE IR 5 MG tablet    ROXICODONE     Take 5 mg by mouth every 8 hours as needed        phentermine 15 MG capsule     30 capsule    Take 1 capsule (15 mg) by mouth every morning    Morbid obesity (H)       topiramate 25 MG tablet    TOPAMAX    140 tablet    Take 2 tablets (50 mg) by mouth 2 times daily    Morbid obesity (H)

## 2018-07-14 NOTE — LETTER
7/14/2018      RE: Brianna Brewer  [address] [place] [id number]       Dear Colleague,    Thank you for the opportunity to participate in the care of your patient, Brianna Brewer, at the [facility]. Please see a copy of my visit note below.     SUBJECTIVE:  Brianna Brewer is a 37 year old female who presents for pre-op evaluation. Planning for Bariatric surgery.    No CAD risk factors. Bese. Not very active. No cardiac symptoms.    Last August had a prolonged  Labour with second child. Had eclampsia and during emergency C section had a vasovagal incident. Also 10 years prior to that had hypotension post Spinal anesthesia.. Recently had removal of adhesions due to C section without  Problem.  Not on any cardiac meds.    Patient Active Problem List    Diagnosis Date Noted     Morbid obesity (H) 01/26/2018     Priority: Medium     Depression 01/26/2018     Priority: Medium     Ventral hernia without obstruction or gangrene 12/13/2017     Priority: Medium    .  Current Outpatient Prescriptions   Medication Sig     Cholecalciferol (D3-1000) 1000 UNITS CAPS Take 3,000 Units by mouth every morning      FIBER PO Take 5 capsules by mouth every morning     FLUoxetine (PROZAC) 40 MG capsule Take 40 mg by mouth every morning      hydrOXYzine (ATARAX) 50 MG tablet Take  mg by mouth as needed      levothyroxine (SYNTHROID/LEVOTHROID) 75 MCG tablet Take 75 mcg by mouth every morning      oxyCODONE IR (ROXICODONE) 5 MG tablet Take 5 mg by mouth every 8 hours as needed      phentermine 15 MG capsule Take 1 capsule (15 mg) by mouth every morning     topiramate (TOPAMAX) 25 MG tablet Take 2 tablets (50 mg) by mouth 2 times daily     No current facility-administered medications for this visit.      Past Medical History:   Diagnosis Date     Anxiety      Chronic diarrhea 2017    Contracted c-diff during surgery     Depressive disorder 2000      History of blood transfusion 2017    3 units of blood during      Hypertension 2017    During pregnancy pre-eclampsia     Hypothyroidism      Morbid obesity with BMI of 50.0-59.9, adult (H)      Preeclampsia      Vasovagal response 2017     Ventral hernia      Past Surgical History:   Procedure Laterality Date     ABDOMEN SURGERY  2017          SECTION  2017     COLONOSCOPY  2017     No Known Allergies  Social History     Social History     Marital status: Single     Spouse name: N/A     Number of children: N/A     Years of education: N/A     Occupational History     Not on file.     Social History Main Topics     Smoking status: Former Smoker     Packs/day: 1.00     Years: 20.00     Types: Cigarettes     Start date: 1998     Quit date: 2016     Smokeless tobacco: Never Used     Alcohol use Yes     Drug use: No     Sexual activity: Not Currently     Partners: Male     Birth control/ protection: Abstinence     Other Topics Concern     Not on file     Social History Narrative     Family History   Problem Relation Age of Onset     Thyroid Disease Mother      Obesity Mother      Thyroid Disease Maternal Grandmother      Cerebrovascular Disease Father           REVIEW OF SYSTEMS:  General: negative, fever, chills, night sweats  Skin: negative, acne, rash and scaling  Eyes: negative, double vision, eye pain and photophobia  Ears/Nose/Throat: negative, nasal congestion and purulent rhinorrhea  Respiratory: No dyspnea on exertion, No cough, No hemoptysis and negative  Cardiovascular: negative, palpitations, tachycardia, irregular heart beat, chest pain, exertional chest pain or pressure, paroxysmal nocturnal dyspnea, dyspnea on exertion and orthopnea  Gastrointestinal: negative, dysphagia, nausea and vomiting  Genitourinary: negative, nocturia, dysuria and frequency  Musculoskeletal: negative, fracture, back pain, neck pain and arthritis  Neurologic: negative, headaches, syncope,  "stroke, seizures and paralysis  Psychiatric: negative, nervous breakdown, thoughts of self-harm and thoughts of hurting someone else  Hematologic/Lymphatic/Immunologic: negative, bleeding disorder, chills and fever  Endocrine: negative, cold intolerance, heat intolerance and hot flashes       OBJECTIVE:  Blood pressure 131/74, pulse 94, height 1.585 m (5' 2.4\"), weight 130.3 kg (287 lb 4.8 oz), last menstrual period 07/10/2018, SpO2 98 %.  General Appearance: alert and no distress  Head: Normocephalic. No masses, lesions, tenderness or abnormalities  Eyes: conjuctiva clear, PERRL, EOM intact  Ears: External ears normal. Canals clear. TM's normal.  Nose: Nares normal  Mouth: normal  Neck: Supple, no cervical adenopathy, no thyromegaly  Lungs: clear to auscultation  Cardiac: regular rate and rhythm, normal S1 and S2, no murmur  Abdomen: Soft, nontender.  Normal bowel sounds.  No hepatosplenomegaly or abnormal masses  Extremities: no peripheral edema, peripheral pulses normal  Musculoskeletal: negative  Neurological: Cranial nerves 2-12 intact, motor strength intact       ASSESSMENT/PLAN:  Here for pre-op evaluation. Planning for Bariatric surgery.  Not very active.  N cardiac symptoms.  No CAD risk factors.  EKG reviewed. NSR. Normal.  Prior events during surgery are isolated events and unlikely to recur.  Considering her age and lack of risk factors,patient do not need further cardiac workup at this time.  She may proceed with planned surgery.  Per orders.   Return to Clinic PRN.    Please do not hesitate to contact me if you have any questions/concerns.     Sincerely,     EVELIN Taylor MD    "

## 2018-07-14 NOTE — NURSING NOTE
Chief Complaint   Patient presents with     New Patient     Cardiac clearance for bariatric surgery on 7/24, appt per Rogelio in PAC clinic     Vitals were taken and medications were reconciled.  CAMDEN Mason  9:56 AM

## 2018-07-14 NOTE — PATIENT INSTRUCTIONS
Patient Instructions:  It was a pleasure to see you in the cardiology clinic today.      If you have any questions, you can reach my nurse, Nasir Sawant, at [phone].  Press Option #1 for the [facility], and then press Option #3 for nursing.    We are encouraging the use of [employer] to communicate with your HealthCare Provider    Please follow up with Dr. Huerta as needed     Sincerely,    EVELIN Huerta MD     If you have an urgent need after hours (8:00 am to 4:30 pm) please call [phone] and ask for the cardiology fellow on call.

## 2018-08-15 ENCOUNTER — TRANSFERRED RECORDS (OUTPATIENT)
Dept: HEALTH INFORMATION MANAGEMENT | Facility: CLINIC | Age: 38
End: 2018-08-15

## 2018-08-24 DIAGNOSIS — E66.01 MORBID OBESITY (H): ICD-10-CM

## 2018-08-24 RX ORDER — TOPIRAMATE 25 MG/1
50 TABLET, FILM COATED ORAL 2 TIMES DAILY
Qty: 120 TABLET | Refills: 1 | COMMUNITY
Start: 2018-08-24 | End: 2019-01-17

## 2018-08-24 NOTE — TELEPHONE ENCOUNTER
Per Renetta Kemp, refill Topiramate. Patient has bariatric surgery in September with Dr. Swanson. [facility]

## 2018-08-27 NOTE — OR NURSING
Pre-op call done with pt. The following note was sent to Alejandrina Miranda, nurse care coordinator:    Goal weight, EKG. Pt's surgery is tomorrow.  Received: Today       Guerline Sutton, Alejandrina Caldera, LPN                     Hi Alejandrina,     Pt's weight at her pre-op physical on 8/22/18 was 280 Lbs (Goal weight=279). Pt says she does not know if she will meet her goal weight tomorrow because she is getting her period. EKG from pre-op physical on 8/22/18  was borderline. It is poor quality. We are requesting a new copy.  (Pt had one in PAC as well).     Thanks.     Guerline Sutton, RN, BSN   [facility] Preadmissions   [phone]

## 2018-08-28 ENCOUNTER — ANESTHESIA (OUTPATIENT)
Dept: SURGERY | Facility: CLINIC | Age: 38
End: 2018-08-28
Payer: MEDICAID

## 2018-08-28 ENCOUNTER — HOSPITAL ENCOUNTER (INPATIENT)
Facility: CLINIC | Age: 38
LOS: 1 days | Discharge: HOME | End: 2018-08-29
Attending: SURGERY | Admitting: SURGERY
Payer: MEDICAID

## 2018-08-28 DIAGNOSIS — E66.01 MORBID OBESITY (H): Primary | ICD-10-CM

## 2018-08-28 LAB
CREAT SERPL-MCNC: 0.87 MG/DL (ref 0.52–1.04)
GFR SERPL CREATININE-BSD FRML MDRD: 73 ML/MIN/1.7M2
GLUCOSE BLDC GLUCOMTR-MCNC: 107 MG/DL (ref 70–99)
HCG UR QL: NEGATIVE
PLATELET # BLD AUTO: 278 10E9/L (ref 150–450)

## 2018-08-28 PROCEDURE — 25000128 H RX IP 250 OP 636: Performed by: NURSE ANESTHETIST, CERTIFIED REGISTERED

## 2018-08-28 PROCEDURE — 25000128 H RX IP 250 OP 636: Performed by: SURGERY

## 2018-08-28 PROCEDURE — 85049 AUTOMATED PLATELET COUNT: CPT | Performed by: SURGERY

## 2018-08-28 PROCEDURE — 36000062 ZZH SURGERY LEVEL 4 1ST 30 MIN - UMMC: Performed by: SURGERY

## 2018-08-28 PROCEDURE — 40000171 ZZH STATISTIC PRE-PROCEDURE ASSESSMENT III: Performed by: SURGERY

## 2018-08-28 PROCEDURE — 71000014 ZZH RECOVERY PHASE 1 LEVEL 2 FIRST HR: Performed by: SURGERY

## 2018-08-28 PROCEDURE — 25000125 ZZHC RX 250: Performed by: SURGERY

## 2018-08-28 PROCEDURE — 36415 COLL VENOUS BLD VENIPUNCTURE: CPT | Performed by: SURGERY

## 2018-08-28 PROCEDURE — 36000064 ZZH SURGERY LEVEL 4 EA 15 ADDTL MIN - UMMC: Performed by: SURGERY

## 2018-08-28 PROCEDURE — 25000566 ZZH SEVOFLURANE, EA 15 MIN: Performed by: SURGERY

## 2018-08-28 PROCEDURE — 25000125 ZZHC RX 250: Performed by: NURSE ANESTHETIST, CERTIFIED REGISTERED

## 2018-08-28 PROCEDURE — 0DB64Z3 EXCISION OF STOMACH, PERCUTANEOUS ENDOSCOPIC APPROACH, VERTICAL: ICD-10-PCS | Performed by: SURGERY

## 2018-08-28 PROCEDURE — 37000008 ZZH ANESTHESIA TECHNICAL FEE, 1ST 30 MIN: Performed by: SURGERY

## 2018-08-28 PROCEDURE — 25000128 H RX IP 250 OP 636: Performed by: PHYSICIAN ASSISTANT

## 2018-08-28 PROCEDURE — 27210794 ZZH OR GENERAL SUPPLY STERILE: Performed by: SURGERY

## 2018-08-28 PROCEDURE — 37000009 ZZH ANESTHESIA TECHNICAL FEE, EACH ADDTL 15 MIN: Performed by: SURGERY

## 2018-08-28 PROCEDURE — C9399 UNCLASSIFIED DRUGS OR BIOLOG: HCPCS | Performed by: NURSE ANESTHETIST, CERTIFIED REGISTERED

## 2018-08-28 PROCEDURE — 82565 ASSAY OF CREATININE: CPT | Performed by: SURGERY

## 2018-08-28 PROCEDURE — 00000146 ZZHCL STATISTIC GLUCOSE BY METER IP

## 2018-08-28 PROCEDURE — 25000132 ZZH RX MED GY IP 250 OP 250 PS 637: Performed by: SURGERY

## 2018-08-28 PROCEDURE — 12000008 ZZH R&B INTERMEDIATE UMMC

## 2018-08-28 PROCEDURE — 88305 TISSUE EXAM BY PATHOLOGIST: CPT | Performed by: SURGERY

## 2018-08-28 PROCEDURE — 81025 URINE PREGNANCY TEST: CPT

## 2018-08-28 PROCEDURE — 25000125 ZZHC RX 250: Performed by: ANESTHESIOLOGY

## 2018-08-28 PROCEDURE — 71000015 ZZH RECOVERY PHASE 1 LEVEL 2 EA ADDTL HR: Performed by: SURGERY

## 2018-08-28 PROCEDURE — 25000128 H RX IP 250 OP 636: Performed by: ANESTHESIOLOGY

## 2018-08-28 RX ORDER — ACETAMINOPHEN 325 MG/1
650 TABLET ORAL EVERY 4 HOURS PRN
Status: DISCONTINUED | OUTPATIENT
Start: 2018-08-31 | End: 2018-08-29 | Stop reason: HOSPADM

## 2018-08-28 RX ORDER — FENTANYL CITRATE 50 UG/ML
INJECTION, SOLUTION INTRAMUSCULAR; INTRAVENOUS PRN
Status: DISCONTINUED | OUTPATIENT
Start: 2018-08-28 | End: 2018-08-28

## 2018-08-28 RX ORDER — HYDROXYZINE HYDROCHLORIDE 25 MG/1
50 TABLET, FILM COATED ORAL EVERY 6 HOURS PRN
Status: DISCONTINUED | OUTPATIENT
Start: 2018-08-28 | End: 2018-08-29 | Stop reason: HOSPADM

## 2018-08-28 RX ORDER — METOCLOPRAMIDE HYDROCHLORIDE 5 MG/ML
10 INJECTION INTRAMUSCULAR; INTRAVENOUS EVERY 6 HOURS PRN
Status: DISCONTINUED | OUTPATIENT
Start: 2018-08-28 | End: 2018-08-29 | Stop reason: HOSPADM

## 2018-08-28 RX ORDER — OXYCODONE HYDROCHLORIDE 5 MG/1
5-10 TABLET ORAL
Status: DISCONTINUED | OUTPATIENT
Start: 2018-08-28 | End: 2018-08-29 | Stop reason: HOSPADM

## 2018-08-28 RX ORDER — ONDANSETRON 2 MG/ML
4 INJECTION INTRAMUSCULAR; INTRAVENOUS EVERY 6 HOURS PRN
Status: DISCONTINUED | OUTPATIENT
Start: 2018-08-28 | End: 2018-08-29 | Stop reason: HOSPADM

## 2018-08-28 RX ORDER — CEFAZOLIN SODIUM 1 G/3ML
1 INJECTION, POWDER, FOR SOLUTION INTRAMUSCULAR; INTRAVENOUS SEE ADMIN INSTRUCTIONS
Status: DISCONTINUED | OUTPATIENT
Start: 2018-08-28 | End: 2018-08-28 | Stop reason: HOSPADM

## 2018-08-28 RX ORDER — SODIUM CHLORIDE, SODIUM LACTATE, POTASSIUM CHLORIDE, CALCIUM CHLORIDE 600; 310; 30; 20 MG/100ML; MG/100ML; MG/100ML; MG/100ML
INJECTION, SOLUTION INTRAVENOUS CONTINUOUS
Status: DISCONTINUED | OUTPATIENT
Start: 2018-08-28 | End: 2018-08-28 | Stop reason: HOSPADM

## 2018-08-28 RX ORDER — ONDANSETRON 4 MG/1
4 TABLET, ORALLY DISINTEGRATING ORAL EVERY 6 HOURS PRN
Status: DISCONTINUED | OUTPATIENT
Start: 2018-08-28 | End: 2018-08-29 | Stop reason: HOSPADM

## 2018-08-28 RX ORDER — AMOXICILLIN 250 MG
2 CAPSULE ORAL 2 TIMES DAILY
Status: DISCONTINUED | OUTPATIENT
Start: 2018-08-28 | End: 2018-08-29 | Stop reason: HOSPADM

## 2018-08-28 RX ORDER — TOPIRAMATE 50 MG/1
50 TABLET, FILM COATED ORAL 2 TIMES DAILY
Status: DISCONTINUED | OUTPATIENT
Start: 2018-08-28 | End: 2018-08-29 | Stop reason: HOSPADM

## 2018-08-28 RX ORDER — FENTANYL CITRATE 50 UG/ML
25-50 INJECTION, SOLUTION INTRAMUSCULAR; INTRAVENOUS
Status: DISCONTINUED | OUTPATIENT
Start: 2018-08-28 | End: 2018-08-28 | Stop reason: HOSPADM

## 2018-08-28 RX ORDER — DEXAMETHASONE SODIUM PHOSPHATE 10 MG/ML
INJECTION, SOLUTION INTRAMUSCULAR; INTRAVENOUS PRN
Status: DISCONTINUED | OUTPATIENT
Start: 2018-08-28 | End: 2018-08-28

## 2018-08-28 RX ORDER — GLYCOPYRROLATE 0.2 MG/ML
INJECTION, SOLUTION INTRAMUSCULAR; INTRAVENOUS PRN
Status: DISCONTINUED | OUTPATIENT
Start: 2018-08-28 | End: 2018-08-28

## 2018-08-28 RX ORDER — PROCHLORPERAZINE MALEATE 5 MG
10 TABLET ORAL EVERY 6 HOURS PRN
Status: DISCONTINUED | OUTPATIENT
Start: 2018-08-28 | End: 2018-08-29 | Stop reason: HOSPADM

## 2018-08-28 RX ORDER — CEFAZOLIN SODIUM 1 G/50ML
3 SOLUTION INTRAVENOUS
Status: COMPLETED | OUTPATIENT
Start: 2018-08-28 | End: 2018-08-28

## 2018-08-28 RX ORDER — LABETALOL HYDROCHLORIDE 5 MG/ML
10 INJECTION, SOLUTION INTRAVENOUS
Status: DISCONTINUED | OUTPATIENT
Start: 2018-08-28 | End: 2018-08-28 | Stop reason: HOSPADM

## 2018-08-28 RX ORDER — NALOXONE HYDROCHLORIDE 0.4 MG/ML
.1-.4 INJECTION, SOLUTION INTRAMUSCULAR; INTRAVENOUS; SUBCUTANEOUS
Status: DISCONTINUED | OUTPATIENT
Start: 2018-08-28 | End: 2018-08-29 | Stop reason: HOSPADM

## 2018-08-28 RX ORDER — ONDANSETRON 4 MG/1
4 TABLET, ORALLY DISINTEGRATING ORAL EVERY 30 MIN PRN
Status: DISCONTINUED | OUTPATIENT
Start: 2018-08-28 | End: 2018-08-28 | Stop reason: HOSPADM

## 2018-08-28 RX ORDER — HYDROMORPHONE HYDROCHLORIDE 1 MG/ML
.3-.5 INJECTION, SOLUTION INTRAMUSCULAR; INTRAVENOUS; SUBCUTANEOUS EVERY 5 MIN PRN
Status: DISCONTINUED | OUTPATIENT
Start: 2018-08-28 | End: 2018-08-28 | Stop reason: HOSPADM

## 2018-08-28 RX ORDER — SODIUM CHLORIDE, SODIUM LACTATE, POTASSIUM CHLORIDE, CALCIUM CHLORIDE 600; 310; 30; 20 MG/100ML; MG/100ML; MG/100ML; MG/100ML
INJECTION, SOLUTION INTRAVENOUS CONTINUOUS
Status: DISCONTINUED | OUTPATIENT
Start: 2018-08-28 | End: 2018-08-29 | Stop reason: HOSPADM

## 2018-08-28 RX ORDER — METOCLOPRAMIDE 10 MG/1
10 TABLET ORAL EVERY 6 HOURS PRN
Status: DISCONTINUED | OUTPATIENT
Start: 2018-08-28 | End: 2018-08-29 | Stop reason: HOSPADM

## 2018-08-28 RX ORDER — LEVOTHYROXINE SODIUM 75 UG/1
75 TABLET ORAL EVERY MORNING
Status: DISCONTINUED | OUTPATIENT
Start: 2018-08-29 | End: 2018-08-29 | Stop reason: HOSPADM

## 2018-08-28 RX ORDER — ONDANSETRON 2 MG/ML
INJECTION INTRAMUSCULAR; INTRAVENOUS PRN
Status: DISCONTINUED | OUTPATIENT
Start: 2018-08-28 | End: 2018-08-28

## 2018-08-28 RX ORDER — ONDANSETRON 2 MG/ML
4 INJECTION INTRAMUSCULAR; INTRAVENOUS EVERY 30 MIN PRN
Status: DISCONTINUED | OUTPATIENT
Start: 2018-08-28 | End: 2018-08-28 | Stop reason: HOSPADM

## 2018-08-28 RX ORDER — LIDOCAINE 40 MG/G
CREAM TOPICAL
Status: DISCONTINUED | OUTPATIENT
Start: 2018-08-28 | End: 2018-08-28 | Stop reason: HOSPADM

## 2018-08-28 RX ORDER — LIDOCAINE HYDROCHLORIDE 20 MG/ML
INJECTION, SOLUTION INFILTRATION; PERINEURAL PRN
Status: DISCONTINUED | OUTPATIENT
Start: 2018-08-28 | End: 2018-08-28

## 2018-08-28 RX ORDER — BUPIVACAINE HYDROCHLORIDE 2.5 MG/ML
INJECTION, SOLUTION EPIDURAL; INFILTRATION; INTRACAUDAL PRN
Status: DISCONTINUED | OUTPATIENT
Start: 2018-08-28 | End: 2018-08-28 | Stop reason: HOSPADM

## 2018-08-28 RX ORDER — AMOXICILLIN 250 MG
1 CAPSULE ORAL 2 TIMES DAILY
Status: DISCONTINUED | OUTPATIENT
Start: 2018-08-28 | End: 2018-08-29 | Stop reason: HOSPADM

## 2018-08-28 RX ORDER — METHOCARBAMOL 750 MG/1
750 TABLET, FILM COATED ORAL 4 TIMES DAILY PRN
Status: DISCONTINUED | OUTPATIENT
Start: 2018-08-28 | End: 2018-08-29 | Stop reason: HOSPADM

## 2018-08-28 RX ORDER — NALOXONE HYDROCHLORIDE 0.4 MG/ML
.1-.4 INJECTION, SOLUTION INTRAMUSCULAR; INTRAVENOUS; SUBCUTANEOUS
Status: DISCONTINUED | OUTPATIENT
Start: 2018-08-28 | End: 2018-08-29

## 2018-08-28 RX ORDER — KETAMINE HYDROCHLORIDE 10 MG/ML
10 INJECTION, SOLUTION INTRAMUSCULAR; INTRAVENOUS
Status: COMPLETED | OUTPATIENT
Start: 2018-08-28 | End: 2018-08-28

## 2018-08-28 RX ORDER — ACETAMINOPHEN 325 MG/1
975 TABLET ORAL EVERY 8 HOURS
Status: DISCONTINUED | OUTPATIENT
Start: 2018-08-28 | End: 2018-08-29 | Stop reason: HOSPADM

## 2018-08-28 RX ORDER — PROPOFOL 10 MG/ML
INJECTION, EMULSION INTRAVENOUS PRN
Status: DISCONTINUED | OUTPATIENT
Start: 2018-08-28 | End: 2018-08-28

## 2018-08-28 RX ORDER — MORPHINE SULFATE 2 MG/ML
2-4 INJECTION, SOLUTION INTRAMUSCULAR; INTRAVENOUS
Status: DISCONTINUED | OUTPATIENT
Start: 2018-08-28 | End: 2018-08-29 | Stop reason: HOSPADM

## 2018-08-28 RX ORDER — SODIUM CHLORIDE, SODIUM LACTATE, POTASSIUM CHLORIDE, CALCIUM CHLORIDE 600; 310; 30; 20 MG/100ML; MG/100ML; MG/100ML; MG/100ML
INJECTION, SOLUTION INTRAVENOUS CONTINUOUS PRN
Status: DISCONTINUED | OUTPATIENT
Start: 2018-08-28 | End: 2018-08-28

## 2018-08-28 RX ORDER — SODIUM CHLORIDE 9 MG/ML
INJECTION, SOLUTION INTRAVENOUS CONTINUOUS
Status: DISCONTINUED | OUTPATIENT
Start: 2018-08-28 | End: 2018-08-29 | Stop reason: HOSPADM

## 2018-08-28 RX ORDER — LIDOCAINE 40 MG/G
CREAM TOPICAL
Status: DISCONTINUED | OUTPATIENT
Start: 2018-08-28 | End: 2018-08-29 | Stop reason: HOSPADM

## 2018-08-28 RX ADMIN — FENTANYL CITRATE 50 MCG: 50 INJECTION INTRAMUSCULAR; INTRAVENOUS at 16:00

## 2018-08-28 RX ADMIN — FENTANYL CITRATE 50 MCG: 50 INJECTION INTRAMUSCULAR; INTRAVENOUS at 17:39

## 2018-08-28 RX ADMIN — LIDOCAINE HYDROCHLORIDE 40 MG: 20 INJECTION, SOLUTION INFILTRATION; PERINEURAL at 13:51

## 2018-08-28 RX ADMIN — FENTANYL CITRATE 50 MCG: 50 INJECTION INTRAMUSCULAR; INTRAVENOUS at 17:00

## 2018-08-28 RX ADMIN — PROPOFOL 200 MG: 10 INJECTION, EMULSION INTRAVENOUS at 13:51

## 2018-08-28 RX ADMIN — FENTANYL CITRATE 50 MCG: 50 INJECTION INTRAMUSCULAR; INTRAVENOUS at 17:35

## 2018-08-28 RX ADMIN — OXYCODONE HYDROCHLORIDE 10 MG: 5 TABLET ORAL at 23:54

## 2018-08-28 RX ADMIN — ROCURONIUM BROMIDE 50 MG: 10 INJECTION INTRAVENOUS at 13:51

## 2018-08-28 RX ADMIN — Medication 0.5 MG: at 16:44

## 2018-08-28 RX ADMIN — ONDANSETRON 4 MG: 2 INJECTION INTRAMUSCULAR; INTRAVENOUS at 15:33

## 2018-08-28 RX ADMIN — ROCURONIUM BROMIDE 30 MG: 10 INJECTION INTRAVENOUS at 14:33

## 2018-08-28 RX ADMIN — TOPIRAMATE 50 MG: 50 TABLET ORAL at 22:00

## 2018-08-28 RX ADMIN — Medication 3 G: at 14:10

## 2018-08-28 RX ADMIN — GLYCOPYRROLATE 0.2 MG: 0.2 INJECTION, SOLUTION INTRAMUSCULAR; INTRAVENOUS at 14:10

## 2018-08-28 RX ADMIN — Medication 10 MG: at 17:45

## 2018-08-28 RX ADMIN — Medication 0.5 MG: at 16:05

## 2018-08-28 RX ADMIN — Medication 0.5 MG: at 17:08

## 2018-08-28 RX ADMIN — Medication 0.5 MG: at 16:35

## 2018-08-28 RX ADMIN — FENTANYL CITRATE 50 MCG: 50 INJECTION, SOLUTION INTRAMUSCULAR; INTRAVENOUS at 15:21

## 2018-08-28 RX ADMIN — ROCURONIUM BROMIDE 20 MG: 10 INJECTION INTRAVENOUS at 14:41

## 2018-08-28 RX ADMIN — FENTANYL CITRATE 50 MCG: 50 INJECTION INTRAMUSCULAR; INTRAVENOUS at 16:17

## 2018-08-28 RX ADMIN — ONDANSETRON 4 MG: 2 INJECTION, SOLUTION INTRAMUSCULAR; INTRAVENOUS at 16:07

## 2018-08-28 RX ADMIN — SODIUM CHLORIDE, POTASSIUM CHLORIDE, SODIUM LACTATE AND CALCIUM CHLORIDE: 600; 310; 30; 20 INJECTION, SOLUTION INTRAVENOUS at 13:43

## 2018-08-28 RX ADMIN — SODIUM CHLORIDE, POTASSIUM CHLORIDE, SODIUM LACTATE AND CALCIUM CHLORIDE: 600; 310; 30; 20 INJECTION, SOLUTION INTRAVENOUS at 16:00

## 2018-08-28 RX ADMIN — OXYCODONE HYDROCHLORIDE 5 MG: 5 TABLET ORAL at 20:40

## 2018-08-28 RX ADMIN — FENTANYL CITRATE 100 MCG: 50 INJECTION, SOLUTION INTRAMUSCULAR; INTRAVENOUS at 13:51

## 2018-08-28 RX ADMIN — FENTANYL CITRATE 50 MCG: 50 INJECTION INTRAMUSCULAR; INTRAVENOUS at 16:41

## 2018-08-28 RX ADMIN — DEXAMETHASONE SODIUM PHOSPHATE 10 MG: 10 INJECTION, SOLUTION INTRAMUSCULAR; INTRAVENOUS at 14:58

## 2018-08-28 RX ADMIN — Medication 10 MG: at 18:45

## 2018-08-28 RX ADMIN — MORPHINE SULFATE 2 MG: 2 INJECTION, SOLUTION INTRAMUSCULAR; INTRAVENOUS at 19:28

## 2018-08-28 RX ADMIN — MORPHINE SULFATE 4 MG: 2 INJECTION, SOLUTION INTRAMUSCULAR; INTRAVENOUS at 21:42

## 2018-08-28 RX ADMIN — Medication 10 MG: at 18:14

## 2018-08-28 RX ADMIN — METHOCARBAMOL 750 MG: 750 TABLET, FILM COATED ORAL at 17:26

## 2018-08-28 RX ADMIN — SUGAMMADEX 200 MG: 100 INJECTION, SOLUTION INTRAVENOUS at 15:36

## 2018-08-28 RX ADMIN — HYDROMORPHONE HYDROCHLORIDE 0.5 MG: 1 INJECTION, SOLUTION INTRAMUSCULAR; INTRAVENOUS; SUBCUTANEOUS at 15:45

## 2018-08-28 ASSESSMENT — PAIN DESCRIPTION - DESCRIPTORS
DESCRIPTORS: CRAMPING;STABBING
DESCRIPTORS: ACHING;THROBBING
DESCRIPTORS: CONSTANT
DESCRIPTORS: ACHING;INTERMITTENT;THROBBING
DESCRIPTORS: CRAMPING;STABBING

## 2018-08-28 NOTE — IP AVS SNAPSHOT
MRN:[id number]                      After Visit Summary   8/28/2018    Brianna Brewer    MRN: [id number]           Thank you!     Thank you for choosing [facility] for your care. Our goal is always to provide you with excellent care. Hearing back from our patients is one way we can continue to improve our services. Please take a few minutes to complete the written survey that you may receive in the mail after you visit with us. Thank you!        Patient Information     Date Of Birth          1980        Designated Caregiver       Most Recent Value    Caregiver    Will someone help with your care after discharge? yes    Name of designated caregiver Zoraida    Phone number of caregiver on file    Caregiver address [place]      About your hospital stay     You were admitted on:  August 28, 2018 You last received care in the:  [address] [facility]    You were discharged on:  August 29, 2018        Reason for your hospital stay       Underwent sleeve gastrectomy                  Who to Call     For medical emergencies, please call 911.  For non-urgent questions about your medical care, please call your primary care provider or clinic, [phone]  For questions related to your surgery, please call your surgery clinic        Attending Provider     Provider Specialty    Zoran Swanson MD Surgery       Primary Care Provider Office Phone # Fax #    Yaya ELMER Williams [phone]      After Care Instructions     Activity       Your activity upon discharge: no driving or alcohol while on narcotics            Diet       Follow this diet upon discharge: post-op diet is bariatric clear liquids for 1 DAY and then advance to full liquids two days after surgery.            Discharge Instructions       Follow up  Follow-up with your surgeon team (Dr. Swanson ) in 1-2 weeks.  This appointment was previously made for you and is scheduled as below. If you have any questions about  appointment scheduling, please call [phone] and choose option #1.    You will receive a call from our clinic nurse after surgery.  If you have any questions and would like to speak with a nurse please call [phone] and choose option #3 to speak with a triage nurse.    Appointments located at [facility]:  [address]  [place] [address]    Diet  If surgery was with Dr Swanson: post-op diet is bariatric clear liquids for 1 DAY and then advance to full liquids two days after surgery.      Activity  - No lifting >10 pounds for 4-6 weeks. Discuss with your surgeon at follow up appointment.  - May shower starting postoperative day #1 but no scrubbing incisions. No bathing or soaking incisions for 2 weeks or until incisions completely healed.  - No driving for at least 12 hours after taking narcotic pain medication.    Wound Care  Keep clean and dry. Steri strips or glue will fall off on their own.    When to call  Call [phone] and choose option #3 to speak with a triage nurse if you are having troubles during regular business hours.  Call [phone] and ask to speak with surgery resident if you are having troubles in the evenings, at night, or on weekends.   Please call if you experience increasing abdominal pain, nausea, vomiting, increasing drainage from your wounds, chills, or fever >101.5    Medications  After surgery it is ok to swallow medications smaller than 1/4 inch (size of pencil eraser) for all procedures.  If medication is larger than 1/4 inch then it will need to be crushed, cut or in liquid form for 1-2 months after surgery. This can be discussed with surgeon team at the 1 month follow up appointment and will depend on patient tolerance.    If you still have your gallbladder you will be given a prescription called Actigall or Ursodiol in a capsule form to prevent gallstones during rapid weight loss (this will be addressed at your first follow up appointment in  1-2 weeks).  This capsule can be opened and put into your liquids or food (when your diet progresses). You will need to take this medications for 6 months after surgery.    You should stop taking your oral diabetes medications until instructed by a doctor to restart (because you will have a greater risk of low blood sugar after your surgery).    You should stop taking any statins for high cholesterol or any vitamins/minerals until instructed by a doctor to restart.    You should not take any NSAIDs (ex: aspirin, ibuprofen, etc.) until cleared by your surgeon.    It is recommended you take over the counter Tylenol in addition to oxycodone (narcotic medication) for pain; see discharge medications for suggestion for Tylenol as this helps with overall pain control. Once you are no longer needing oxycodone for pain control, you can decrease the Tylenol dosage and frequency and then stop taking it. Do not take more than 4,000 mg of acetaminophen (Tylenol) from all sources to prevent liver damage.     It is recommended that you take stool softener while taking narcotic pain medication to prevent constipation (senna-docusate works well, or any other over-the-counter medication you would like for stool softening).                  Follow-up Appointments     Adult [facility] Follow-up and recommended labs and tests       AppointFollow up with Dr. Swanson in Bariatric surgery clinic in 1 week. Phone follow up will be arranged.    Appointments on [place] and/or [facility] (with [facility] provider or service). Call [phone] if you haven't heard regarding these appointments within 7 days of discharge.ments on [place] and/or [facility] (with [facility] or [facility] provider or service). Call [phone] if you haven't heard regarding these appointments within 7 days of discharge.                  Your next 10 appointments already scheduled     Sep 06, 2018 11:00 AM CDT   (Arrive by 10:45 AM)   Bariatric Post Op Global  Visit with VERONICA Pascal [facility] ([facility])    [address] [address]   [phone]            Sep 06, 2018 12:00 PM CDT   (Arrive by 11:45 AM)   Return Bariatric Nutrition Visit with Bernie Dalton RD   [facility] ([facility])    [address] [place] [address]   [phone]            Oct 04, 2018 11:40 AM CDT   (Arrive by 11:25 AM)   Bariatric Post Op Global Visit with Zoran Swanson MD   [facility] ([facility])    [address] [place] [id number]   [phone]            Oct 04, 2018 12:00 PM CDT   (Arrive by 11:45 AM)   Return Bariatric Nutrition Visit with Bernie Dalton RD   [facility] ([facility])    [address] [place] [address]   [phone]              Additional Information     If you use hormonal birth control (such as the pill, patch, ring or implants): You'll need a second form of birth control for 7 days (condoms, a diaphragm or contraceptive foam). While in the hospital, you received a medicine called Bridion. Your normal birth control will not work as well for a week after taking this medicine.          Pending Results     Date and Time Order Name Status Description    8/28/2018 1530 Surgical pathology exam In process             Statement of Approval     Ordered          08/29/18 1307  I have reviewed and agree with all the recommendations and orders detailed in this document.  EFFECTIVE NOW     Approved and electronically signed by:  Dat Suresh MD             Admission Information     Date & Time Provider Department Dept. Phone    8/28/2018 Zoran Swanson MD [address] [facility] [place] [phone]      Your Vitals Were     Blood Pressure  "Pulse Temperature Respirations Height Weight    100/45 (BP Location: Left arm) 77 97.1  F (36.2  C) (Oral) 16 1.575 m (5' 2\") 127.6 kg (281 lb 4.9 oz)    Last Period Pulse Oximetry BMI (Body Mass Index)             (Within Weeks) 98% 51.45 kg/m2         [facility] Information     [employer] gives you secure access to your electronic health record. If you see a primary care provider, you can also send messages to your care team and make appointments. If you have questions, please call your primary care clinic.  If you do not have a primary care provider, please call [phone] and they will assist you.        Care EveryWhere ID     This is your Care EveryWhere ID. This could be used by other organizations to access your [facility] medical records  [id number]        Equal Access to Services     JANICE HARDIN : Nabila Zuñiga, julian crockett, cydney tinsley. So [facility] [place] [facility] [phone].    ATENCIÓN: Si habla español, tiene a parish disposición servicios gratuitos de asistencia lingüística. Des al [phone].    We comply with applicable federal civil rights laws and [place] laws. We do not discriminate on the basis of race, color, national origin, age, disability, sex, sexual orientation, or gender identity.               Review of your medicines      START taking        Dose / Directions    acetaminophen 325 MG tablet   Commonly known as:  TYLENOL   Used for:  Morbid obesity (H)        Dose:  650 mg   Take 2 tablets (650 mg) by mouth every 6 hours as needed for other (multimodal surgical pain management along with NSAIDS and opioid medication as indicated based on pain control and physical function.)   Quantity:  30 tablet   Refills:  0       hyoscyamine 0.125 MG tablet   Commonly known as:  ANASPAZ/LEVSIN   Used for:  Morbid obesity (H)        Dose:  0.125-0.25 mg   Take 1-2 tablets (125-250 mcg) by mouth every 4 hours as needed for cramping   Quantity:  60 " tablet   Refills:  0       omeprazole 20 MG CR capsule   Commonly known as:  priLOSEC   Used for:  Morbid obesity (H)        Dose:  20 mg   Take 1 capsule (20 mg) by mouth 2 times daily   Quantity:  60 capsule   Refills:  0       ondansetron 4 MG ODT tab   Commonly known as:  ZOFRAN-ODT   Used for:  Morbid obesity (H)        Dose:  4 mg   Take 1 tablet (4 mg) by mouth every 4 hours as needed for nausea or vomiting   Quantity:  30 tablet   Refills:  0       senna-docusate 8.6-50 MG per tablet   Commonly known as:  SENOKOT-S;PERICOLACE   Used for:  Morbid obesity (H)        Dose:  1 tablet   Take 1 tablet by mouth 2 times daily   Quantity:  30 tablet   Refills:  0         CONTINUE these medicines which have NOT CHANGED        Dose / Directions    D3-1000 1000 units Caps        Dose:  3000 Units   Take 3,000 Units by mouth every morning   Refills:  0       FIBER PO        Dose:  5 capsule   Take 5 capsules by mouth every morning   Refills:  0       FLUoxetine 40 MG capsule   Commonly known as:  PROzac        Dose:  40 mg   Take 40 mg by mouth every morning   Refills:  0       hydrOXYzine 50 MG tablet   Commonly known as:  ATARAX        Dose:   mg   Take  mg by mouth as needed   Refills:  0       levothyroxine 75 MCG tablet   Commonly known as:  SYNTHROID/LEVOTHROID        Dose:  75 mcg   Take 75 mcg by mouth every morning   Refills:  0       oxyCODONE IR 5 MG tablet   Commonly known as:  ROXICODONE        Dose:  5 mg   Take 5 mg by mouth every 8 hours as needed   Refills:  0       topiramate 25 MG tablet   Commonly known as:  TOPAMAX   Used for:  Morbid obesity (H)        Dose:  50 mg   Take 2 tablets (50 mg) by mouth 2 times daily   Quantity:  120 tablet   Refills:  1         STOP taking     phentermine 15 MG capsule                Where to get your medicines      These medications were sent to [facility] - [place], [place] - [address]  [address] [address]      Phone:  [phone]     hyoscyamine 0.125 MG tablet    omeprazole 20 MG CR capsule    ondansetron 4 MG ODT tab    senna-docusate 8.6-50 MG per tablet         Some of these will need a paper prescription and others can be bought over the counter. Ask your nurse if you have questions.     Bring a paper prescription for each of these medications     acetaminophen 325 MG tablet                Protect others around you: Learn how to safely use, store and throw away your medicines at www.disposemymeds.org.             Medication List: This is a list of all your medications and when to take them. Check marks below indicate your daily home schedule. Keep this list as a reference.      Medications           Morning Afternoon Evening Bedtime As Needed    acetaminophen 325 MG tablet   Commonly known as:  TYLENOL   Take 2 tablets (650 mg) by mouth every 6 hours as needed for other (multimodal surgical pain management along with NSAIDS and opioid medication as indicated based on pain control and physical function.)   Last time this was given:  975 mg on 8/29/2018  8:08 AM                                D3-1000 1000 units Caps   Take 3,000 Units by mouth every morning                                FIBER PO   Take 5 capsules by mouth every morning                                FLUoxetine 40 MG capsule   Commonly known as:  PROzac   Take 40 mg by mouth every morning   Last time this was given:  40 mg on 8/29/2018  8:08 AM                                hydrOXYzine 50 MG tablet   Commonly known as:  ATARAX   Take  mg by mouth as needed                                hyoscyamine 0.125 MG tablet   Commonly known as:  ANASPAZ/LEVSIN   Take 1-2 tablets (125-250 mcg) by mouth every 4 hours as needed for cramping                                levothyroxine 75 MCG tablet   Commonly known as:  SYNTHROID/LEVOTHROID   Take 75 mcg by mouth every morning   Last time this was given:  75 mcg on 8/29/2018  8:08 AM                                 omeprazole 20 MG CR capsule   Commonly known as:  priLOSEC   Take 1 capsule (20 mg) by mouth 2 times daily                                ondansetron 4 MG ODT tab   Commonly known as:  ZOFRAN-ODT   Take 1 tablet (4 mg) by mouth every 4 hours as needed for nausea or vomiting                                oxyCODONE IR 5 MG tablet   Commonly known as:  ROXICODONE   Take 5 mg by mouth every 8 hours as needed   Last time this was given:  10 mg on 8/29/2018 12:58 PM                                senna-docusate 8.6-50 MG per tablet   Commonly known as:  SENOKOT-S;PERICOLACE   Take 1 tablet by mouth 2 times daily   Last time this was given:  1 tablet on 8/29/2018  8:08 AM                                topiramate 25 MG tablet   Commonly known as:  TOPAMAX   Take 2 tablets (50 mg) by mouth 2 times daily   Last time this was given:  50 mg on 8/29/2018  8:14 AM

## 2018-08-28 NOTE — OR NURSING
Pt given PO Robaxin in PACU. Pt continues to have excruiating stabbing/cramping pain in her abdominal area. Dr Magallanes aware, ok to give an additional 100 mcg fentanyl (over the 250 mcg max dose in order). MDA also placed order for ketamine IV push.

## 2018-08-28 NOTE — OP NOTE
[facility], [place]    Operative Note    Pre-operative diagnosis: Morbid Obesity    Post-operative diagnosis * No post-op diagnosis entered *   Procedure: Procedure(s):  Laparoscopic Sleeve Gastrectomy - Wound Class: II-Clean Contaminated   Surgeon: Surgeon(s) and Role:     * Zoran Swanson MD - Primary     * Ruddy Gasca MD - Resident - Assisting   Assistant Surgeon  Anesthesia: Ruddy Gasca MD; [please note that Dr. Gasca was scrubbed and assisted during the operation due to the unavailability of a qualified surgical resident.]  General    Estimated blood loss: Minimal   Drains: None   Specimens:   ID Type Source Tests Collected by Time Destination   A : PARTIAL GASTRECTOMY SLEEVE Tissue Other SURGICAL PATHOLOGY EXAM Zoran Swanson MD 2018  3:30 PM       Findings: Atypical bleeding inherent to the operation that was recognized and controlled.   Complications: None.   Implants: None.         BOUGIE SIZE: 40 FR  DISTANCE FROM PYLORUS: 10 CM  STAPLE LINE REINFORCEMENT: NO  STAPLE LINE OVERSEW: NO  COMORBIDITIES:   Past Medical History:   Diagnosis Date     Anxiety      Chronic diarrhea 2017    Contracted c-diff during surgery     Depressive disorder 2000     History of blood transfusion 2017    3 units of blood during      Hypertension 2017    During pregnancy pre-eclampsia     Hypothyroidism      Morbid obesity with BMI of 50.0-59.9, adult (H)      Preeclampsia      Vasovagal response 2017     Ventral hernia        INDICATIONS FOR PROCEDURE  Brianna Brewer is a 37 year old female who is morbidly obese.  Numerous weight loss attempts without surgery have been without success.     After understanding the risks and benefits of proceeding with a laparoscopic vertical sleeve gastrectomy, she agreed to an operation as outlined by me.    I reviewed the risks of surgery with Brianna Brewer.    These include, but are not limited to, death,  "myocardial infarction, pneumonia, urinary tract infection, deep venous thrombosis with or without pulmonary embolus, abdominal infection from bowel injury or abscess, bowel obstruction, wound infection, and bleeding.    More specific risks related to vertical sleeve gastrectomy were detailed at the bariatric informational seminar and include the followin.) leak at the vertical sleeve staple line, 2.) stricture in the sleeve, 3.) nausea, vomiting, and dehydration for several months, 4.) adhesions causing bowel obstruction, 5.) rapid weight loss causing a higher rate of gallstone formation during the first 6 months after surgery, 6.) decreased absorption of vitamins because of the reduced stomach size, 7.) weight regain if inappropriate food intake occurs.    The BMI that we are treating this patient for was measured at the initial consultation visit in our bariatric program and it was: 52.5 kg/m2 (as calculated just below).      The initial consult height, weight, and BMI are as follows:    Height: 5' 2\"   BARIATRIC WEIGHT TRACKING 2018   Initial Weight 287 lbs 5 oz       Our weight loss surgery program requires weight loss prior to bariatric surgery and currently the height, weight, and BMI are as follows:    Height: 157.5 cm (5' 2\"), Weight: 127.6 kg (281 lb 4.9 oz), and currently the Body mass index is 51.45 kg/(m^2).    Due to the patient's comorbidity conditions of HTN, anxiety, and depression, in association with elevated body mass index, bariatric surgery has been recommended and is being performed today.    Moreover, as the surgeon performing this procedure, I certify that the following are true in regards to this patient at or prior to the day of surgery:    1. The patient's body mass index (BMI) is or has been greater than or equal to 35 kg/m2.  2. The patient has at least one co-morbidity related to obesity (as outlined above).  3. The patient has been previously unsuccessful with medical " treatment for obesity.  Please note that some of this information has been documented as part of a comprehensive pre-bariatric surgery process in the outpatient clinic and is NOT immediately available in the inpatient encounter for this bariatric operation.      OPERATIVE PROCEDURE:     Brianna Brewer was brought to the operating room and prepared in routine fashion. Under the benefits of general anesthesia, a left upper quadrant Veress needle was inserted and pneumoperitoneum was established using carbon dioxide gas to a maximum pressure of 15 mmHg. A total of five ports were placed into the abdomen.     A liver retractor was placed through the rightmost port and this provided a view of the upper stomach. The operation was started by dividing the short gastric vessels off the greater curvature of the stomach. This dissection was carried up to the angle of His, and ligasure dissector was used for hemostasis.     A bougie (size noted above) was passed into the stomach and I used 4 blue loads of the [employer] linear cutting stapler stapler device to create a vertical sleeve gastrectomy with the bougie as a template. The bougie was removed.    The sleeve gastrectomy specimen (partial gastrectomy) was now removed from the abdomen through the 15 mm port.     Hemostasis was secured using a 5mm clip applier, and the liver retractor and all ports were removed from the abdomen under direct visualization.     All needle and sponge counts were correct x2 at the end of the operation, and I was present for all critical components of the procedure.     Skin incisions were closed using skin staples, and sterile dressings were placed.     I was present for all critical components of the operation and all needle and sponge counts were correct x2 at the end of the procedure.    Zoran Swanson MD  Surgery  [phone] (hospital )      Note: Lovenox intentionally withheld on pod 1 due to bleeding

## 2018-08-28 NOTE — PROGRESS NOTES
MDA called r/t pt hyperventilating, tachycardia into 140's on monitor with stable BP and pain under the rib cage despite all interventions. Total of 20 mg of ketamine given IV. Dr. Magallanes gave a verbal order for 1-2 mg versed IV to be given. 1 mg versed given IV at 1821.

## 2018-08-28 NOTE — IP AVS SNAPSHOT
[address] [address]    Phone:  [phone]                                       After Visit Summary   8/28/2018    Brianna Brewer    MRN: [id number]           After Visit Summary Signature Page     I have received my discharge instructions, and my questions have been answered. I have discussed any challenges I see with this plan with the nurse or doctor.    ..........................................................................................................................................  Patient/Patient Representative Signature      ..........................................................................................................................................  Patient Representative Print Name and Relationship to Patient    ..................................................               ................................................  Date                                            Time    ..........................................................................................................................................  Reviewed by Signature/Title    ...................................................              ..............................................  Date                                                            Time          22EPIC Rev 08/18

## 2018-08-29 VITALS
WEIGHT: 281.31 LBS | SYSTOLIC BLOOD PRESSURE: 100 MMHG | BODY MASS INDEX: 51.77 KG/M2 | DIASTOLIC BLOOD PRESSURE: 45 MMHG | HEIGHT: 62 IN | OXYGEN SATURATION: 98 % | HEART RATE: 77 BPM | TEMPERATURE: 97.1 F | RESPIRATION RATE: 16 BRPM

## 2018-08-29 PROCEDURE — 25000128 H RX IP 250 OP 636: Performed by: SURGERY

## 2018-08-29 PROCEDURE — 25000132 ZZH RX MED GY IP 250 OP 250 PS 637: Performed by: SURGERY

## 2018-08-29 RX ORDER — ACETAMINOPHEN 325 MG/1
650 TABLET ORAL EVERY 6 HOURS PRN
Qty: 30 TABLET | Refills: 0 | Status: SHIPPED | OUTPATIENT
Start: 2018-08-29

## 2018-08-29 RX ORDER — AMOXICILLIN 250 MG
1 CAPSULE ORAL 2 TIMES DAILY
Qty: 30 TABLET | Refills: 0 | Status: SHIPPED | OUTPATIENT
Start: 2018-08-29 | End: 2019-01-17

## 2018-08-29 RX ORDER — HYOSCYAMINE SULFATE 0.125 MG
0.125-0.25 TABLET ORAL EVERY 4 HOURS PRN
Qty: 60 TABLET | Refills: 0 | Status: SHIPPED | OUTPATIENT
Start: 2018-08-29 | End: 2019-01-17

## 2018-08-29 RX ORDER — ONDANSETRON 4 MG/1
4 TABLET, ORALLY DISINTEGRATING ORAL EVERY 4 HOURS PRN
Qty: 30 TABLET | Refills: 0 | Status: SHIPPED | OUTPATIENT
Start: 2018-08-29 | End: 2018-10-04

## 2018-08-29 RX ADMIN — LEVOTHYROXINE SODIUM 75 MCG: 75 TABLET ORAL at 08:08

## 2018-08-29 RX ADMIN — ACETAMINOPHEN 650 MG: 325 TABLET, FILM COATED ORAL at 01:13

## 2018-08-29 RX ADMIN — TOPIRAMATE 50 MG: 50 TABLET ORAL at 08:14

## 2018-08-29 RX ADMIN — ACETAMINOPHEN 975 MG: 325 TABLET, FILM COATED ORAL at 08:08

## 2018-08-29 RX ADMIN — METHOCARBAMOL 750 MG: 750 TABLET, FILM COATED ORAL at 08:14

## 2018-08-29 RX ADMIN — SENNOSIDES AND DOCUSATE SODIUM 1 TABLET: 8.6; 5 TABLET ORAL at 08:08

## 2018-08-29 RX ADMIN — OXYCODONE HYDROCHLORIDE 10 MG: 5 TABLET ORAL at 12:58

## 2018-08-29 RX ADMIN — OXYCODONE HYDROCHLORIDE 10 MG: 5 TABLET ORAL at 06:41

## 2018-08-29 RX ADMIN — OXYCODONE HYDROCHLORIDE 10 MG: 5 TABLET ORAL at 09:54

## 2018-08-29 RX ADMIN — MORPHINE SULFATE 4 MG: 2 INJECTION, SOLUTION INTRAMUSCULAR; INTRAVENOUS at 04:00

## 2018-08-29 RX ADMIN — FLUOXETINE 40 MG: 20 CAPSULE ORAL at 08:08

## 2018-08-29 RX ADMIN — METHOCARBAMOL 750 MG: 750 TABLET, FILM COATED ORAL at 12:59

## 2018-08-29 RX ADMIN — MORPHINE SULFATE 2 MG: 2 INJECTION, SOLUTION INTRAMUSCULAR; INTRAVENOUS at 01:13

## 2018-08-29 ASSESSMENT — ACTIVITIES OF DAILY LIVING (ADL)
ADLS_ACUITY_SCORE: 9

## 2018-08-29 ASSESSMENT — PAIN DESCRIPTION - DESCRIPTORS
DESCRIPTORS: DISCOMFORT
DESCRIPTORS: DISCOMFORT

## 2018-08-29 NOTE — PLAN OF CARE
Problem: Patient Care Overview  Goal: Plan of Care/Patient Progress Review  Outcome: No Change  Patient arrived to floor about 2015. CC is pain, medicated with PRN morphine and PRN oxycodone. Results unacceptable. Patient voided 300cc clear patrice urine, ambulated to bathroom with SBA. Aelxander clears diet, tolerating ok. On 2LNC, PIV infusing LR at 75mL/hr. 6 lap sites primapore covered, one is 1/4 shadowed with drainage. Continue POC.

## 2018-08-29 NOTE — PROGRESS NOTES
Cross-cover note: 7:01 PM 8/28/2018 08/28/2018    General Surgery Cross Cover Note    Was called by nursing regarding post-op pain while patient still in PACU. Patient has been having spasms of pain since leaving the OR and has received 2 mg dilaudid, 300 mg fentanyl, 20 mg ketamine, 1 mg versed, and 750 mg robaxin since entering PACU. In the OR she received 300 fentanyl, 0.5 mg dilaudid.   Patient states pain is primarily sub xiphoid, but is sometimes on the right or left flanks. Occurs in spasms, at which time her heart rate increases and she becomes tachypneic, wincing and writhing in bed (witnessed as she is on monitor). Pain does not radiate, is not associated with diaphoresis, nausea or vomiting.     B/P: 125/105, T: 99.3, P: , R: 15    PE:  A&O x3, In distress when havparoxysms, not distressed between spasms  Breathing non-labored on 2L NC  Abd obese, soft, non-tender, non-distended, no peritoneal signs, incisions covered with dressings, C/D/I. Some staining on L Lateral port site dressing  Extr. Warm to touch    Plan:  Reassess in 2 hours.   Patient is stable, though tachycardic when having episodes of pain.       Cierra Deluca MD  PGY-1 Surgery Crosscover  [phone]      Seen again on the floor, pain much more controlled, though continues to have subcostal discomfort. No chest pain or shortness of breath. Has not yet voided.     , /61, temp 97.3, satting 96% on 2L    Exam:   Resting comfortably in bed, on 2L NC  Abd obese, tender to palpation in bilateral upper quadrants, no rebound or guarding.   Extremities warm and well perfused    Continue current plan of care.     Cierra Deluca MD

## 2018-08-29 NOTE — PLAN OF CARE
"Problem: Patient Care Overview  Goal: Plan of Care/Patient Progress Review  Outcome: No Change  Vitals:    08/29/18 0350 08/29/18 0748 08/29/18 0900 08/29/18 1158   BP: 133/69 117/48  100/45   BP Location: Left arm   Left arm   Pulse:    77   Resp: 15 14  16   Temp: 97.7  F (36.5  C) 97.8  F (36.6  C)  97.1  F (36.2  C)   TempSrc: Oral Oral  Oral   SpO2: 98% 98% 98%    Weight:       Height:       AVSS with O2 sats in the high 90s at RA. Pain well controled with oxycodone, Robaxin and scheduled tylenol. Staples removed by primary team this am. Lap sites intact. On clears only per pt's preference- \"full liquid choices not appealing to me\". OOB walking on unit x 2. Slow to walk but steady on her feet. Needed walker for support. Piv removed. [place]. Mom at bed side. Waiting for electronic signature for discharge home this afternoon. Md updated to place dc signature.      "

## 2018-08-29 NOTE — PLAN OF CARE
Problem: Patient Care Overview  Goal: Plan of Care/Patient Progress Review  Outcome: No Change  Afeb, vitals stable, 02 sats 95-98% on 2 LPM/nc, c/o abd pain, morphine iv x2 and oxycodone po x1 with some relief, abd lap sites x6, dry,intact with scant amt of old drng, belly round with no bowel sounds or gas, lungs clear, diminished, using IS with encouragement, iv infusing,has not voided yet this shift,will get up at 0600, taking sips, ice and popsicle without nausea, mom at bedside, appeared to rest between cares, offers no further c/o  0600 up to bathroom with sba, voided 325cc patrice urine

## 2018-08-29 NOTE — PHARMACY-CONSULT NOTE
Bariatric Consult    Medications evaluated as requested per Bariatric Consult.    No medication changes were needed based on the Bariatric Medication Management Policy.    The pharmacist will continue to follow as new medications are ordered.

## 2018-08-29 NOTE — DISCHARGE SUMMARY
"[facility]   MIS Discharge Summary    Date of Admission: 8/28/2018  Date of Discharge: 8/29/2018    Admission Diagnosis:  1. Morbid Obesity    Discharge Diagnosis:  1. Same as above    Consultations:  None    Procedures:  1. Laparoscopic sleeve gastrectomy on 8/28/2018 by Dr Kiki Trivedi HPI:  Per chart review: Brianna Brewer is a 37 year old female who is morbidly obese.  Numerous weight loss attempts without surgery have been without success.     Hospital Course:  The patient was admitted and underwent the above procedure. The patient tolerated the procedure well. There were no complications. The patient's diet was slowly advanced as bowel function returned. Pain was controlled with oral pain medication and the patient was able to ambulate and void without difficulty. The patient received appropriate education post operatively. On POD #1 the patient was discharged to home.    Discharge Physical Exam:  /48  Temp 97.8  F (36.6  C) (Oral)  Resp 14  Ht 1.575 m (5' 2\")  Wt 127.6 kg (281 lb 4.9 oz)  LMP  (Within Weeks)  SpO2 98%  BMI 51.45 kg/m2    Gen: NAD  Lungs: non-labored breathing  CV: non cyanotic    Abd: obese, soft, nondistended, appropriately tender, incisions are c/d/i  Ext: no peripheral edema  Neuro: AOx3    Meds:     Review of your medicines      START taking       Dose / Directions    acetaminophen 325 MG tablet   Commonly known as:  TYLENOL   Used for:  Morbid obesity (H)        Dose:  650 mg   Take 2 tablets (650 mg) by mouth every 6 hours as needed for other (multimodal surgical pain management along with NSAIDS and opioid medication as indicated based on pain control and physical function.)   Quantity:  30 tablet   Refills:  0       hyoscyamine 0.125 MG tablet   Commonly known as:  ANASPAZ/LEVSIN   Used for:  Morbid obesity (H)        Dose:  0.125-0.25 mg   Take 1-2 tablets (125-250 mcg) by mouth every 4 hours as needed for cramping "   Quantity:  60 tablet   Refills:  0       omeprazole 20 MG CR capsule   Commonly known as:  priLOSEC   Used for:  Morbid obesity (H)        Dose:  20 mg   Take 1 capsule (20 mg) by mouth 2 times daily   Quantity:  60 capsule   Refills:  0       ondansetron 4 MG ODT tab   Commonly known as:  ZOFRAN-ODT   Used for:  Morbid obesity (H)        Dose:  4 mg   Take 1 tablet (4 mg) by mouth every 4 hours as needed for nausea or vomiting   Quantity:  30 tablet   Refills:  0       senna-docusate 8.6-50 MG per tablet   Commonly known as:  SENOKOT-S;PERICOLACE   Used for:  Morbid obesity (H)        Dose:  1 tablet   Take 1 tablet by mouth 2 times daily   Quantity:  30 tablet   Refills:  0         CONTINUE these medicines which have NOT CHANGED       Dose / Directions    D3-1000 1000 units Caps        Dose:  3000 Units   Take 3,000 Units by mouth every morning   Refills:  0       FIBER PO        Dose:  5 capsule   Take 5 capsules by mouth every morning   Refills:  0       FLUoxetine 40 MG capsule   Commonly known as:  PROzac        Dose:  40 mg   Take 40 mg by mouth every morning   Refills:  0       hydrOXYzine 50 MG tablet   Commonly known as:  ATARAX        Dose:   mg   Take  mg by mouth as needed   Refills:  0       levothyroxine 75 MCG tablet   Commonly known as:  SYNTHROID/LEVOTHROID        Dose:  75 mcg   Take 75 mcg by mouth every morning   Refills:  0       oxyCODONE IR 5 MG tablet   Commonly known as:  ROXICODONE        Dose:  5 mg   Take 5 mg by mouth every 8 hours as needed   Refills:  0       topiramate 25 MG tablet   Commonly known as:  TOPAMAX   Used for:  Morbid obesity (H)        Dose:  50 mg   Take 2 tablets (50 mg) by mouth 2 times daily   Quantity:  120 tablet   Refills:  1         STOP taking          phentermine 15 MG capsule                Where to get your medicines      These medications were sent to [facility] [place], [place] - [address]  [address],  [facility] [address]     Phone:  [phone]      hyoscyamine 0.125 MG tablet     omeprazole 20 MG CR capsule     ondansetron 4 MG ODT tab     senna-docusate 8.6-50 MG per tablet         Some of these will need a paper prescription and others can be bought over the counter. Ask your nurse if you have questions.     Bring a paper prescription for each of these medications      acetaminophen 325 MG tablet             Additional instructions:  After Care     Future Labs/Procedures    Activity     Comments:    Your activity upon discharge: no driving or alcohol while on narcotics    Diet     Comments:    Follow this diet upon discharge: post-op diet is bariatric clear liquids for 1 DAY and then advance to full liquids two days after surgery.    Discharge Instructions     Comments:    Follow up  Follow-up with your surgeon team (Dr. Swanson ) in 1-2 weeks.  This appointment was previously made for you and is scheduled as below. If you have any questions about appointment scheduling, please call [phone] and choose option #1.    You will receive a call from our clinic nurse after surgery.  If you have any questions and would like to speak with a nurse please call [phone] and choose option #3 to speak with a triage nurse.    Appointments located at [facility] clinic:  [address]  [place] [address]    Diet  If surgery was with Dr Swanson: post-op diet is bariatric clear liquids for 1 DAY and then advance to full liquids two days after surgery.      Activity  - No lifting >10 pounds for 4-6 weeks. Discuss with your surgeon at follow up appointment.  - May shower starting postoperative day #1 but no scrubbing incisions. No bathing or soaking incisions for 2 weeks or until incisions completely healed.  - No driving for at least 12 hours after taking narcotic pain medication.    Wound Care  Keep clean and dry. Steri strips or glue will fall off on their own.    When to call  Call [phone]  and choose option #3 to speak with a triage nurse if you are having troubles during regular business hours.  Call [phone] and ask to speak with surgery resident if you are having troubles in the evenings, at night, or on weekends.   Please call if you experience increasing abdominal pain, nausea, vomiting, increasing drainage from your wounds, chills, or fever >101.5    Medications  After surgery it is ok to swallow medications smaller than 1/4 inch (size of pencil eraser) for all procedures.  If medication is larger than 1/4 inch then it will need to be crushed, cut or in liquid form for 1-2 months after surgery. This can be discussed with surgeon team at the 1 month follow up appointment and will depend on patient tolerance.    If you still have your gallbladder you will be given a prescription called Actigall or Ursodiol in a capsule form to prevent gallstones during rapid weight loss (this will be addressed at your first follow up appointment in 1-2 weeks).  This capsule can be opened and put into your liquids or food (when your diet progresses). You will need to take this medications for 6 months after surgery.    You should stop taking your oral diabetes medications until instructed by a doctor to restart (because you will have a greater risk of low blood sugar after your surgery).    You should stop taking any statins for high cholesterol or any vitamins/minerals until instructed by a doctor to restart.    You should not take any NSAIDs (ex: aspirin, ibuprofen, etc.) until cleared by your surgeon.    It is recommended you take over the counter Tylenol in addition to oxycodone (narcotic medication) for pain; see discharge medications for suggestion for Tylenol as this helps with overall pain control. Once you are no longer needing oxycodone for pain control, you can decrease the Tylenol dosage and frequency and then stop taking it. Do not take more than 4,000 mg of acetaminophen (Tylenol) from all sources  to prevent liver damage.     It is recommended that you take stool softener while taking narcotic pain medication to prevent constipation (senna-docusate works well, or any other over-the-counter medication you would like for stool softening).              Follow Up:  -Follow up with Dr Swanson in clinic in 1 week(s) after discharge. You should be called to make an appointment within 3 business days. If you are not contacted, call [phone] to make an appointment.

## 2018-08-29 NOTE — ANESTHESIA POSTPROCEDURE EVALUATION
Patient: Brianna Brewer    Procedure(s):  Laparoscopic Sleeve Gastrectomy - Wound Class: II-Clean Contaminated    Diagnosis:Morbid Obesity   Diagnosis Additional Information: No value filed.    Anesthesia Type:  General, ETT    Note:  Anesthesia Post Evaluation    Patient location during evaluation: PACU  Patient participation: Able to fully participate in evaluation  Level of consciousness: awake and alert  Pain management: adequate  Airway patency: patent  Cardiovascular status: acceptable  Respiratory status: acceptable  Hydration status: acceptable  PONV: none     Anesthetic complications: None          Last vitals:  Vitals:    08/28/18 1930 08/28/18 2003 08/28/18 2036   BP: 125/80  125/61   Resp: 18  16   Temp: 37.4  C (99.3  F)  36.3  C (97.3  F)   SpO2: 99% 99% 96%         Electronically Signed By: Micah Magallanes MD  August 28, 2018  9:53 PM

## 2018-08-29 NOTE — PLAN OF CARE
Problem: Patient Care Overview  Goal: Plan of Care/Patient Progress Review  Outcome: No Change  Vitals:    08/29/18 0350 08/29/18 0748 08/29/18 0900 08/29/18 1158   BP: 133/69 117/48  100/45   BP Location: Left arm   Left arm   Pulse:    77   Resp: 15 14  16   Temp: 97.7  F (36.5  C) 97.8  F (36.6  C)  97.1  F (36.2  C)   TempSrc: Oral Oral  Oral   SpO2: 98% 98% 98%    Weight:       Height:       AVSS. Discussed dc instructions and f/u appointments with pt. Discharge print out given to pt. Mother  dc medications. Tylenol Rx will be sent to her primary pharmacy. Request out for a WC assist off unit.

## 2018-08-30 LAB — COPATH REPORT: NORMAL

## 2018-09-06 ENCOUNTER — OFFICE VISIT (OUTPATIENT)
Dept: SURGERY | Facility: CLINIC | Age: 38
End: 2018-09-06
Payer: MEDICAID

## 2018-09-06 ENCOUNTER — INFUSION THERAPY VISIT (OUTPATIENT)
Dept: INFUSION THERAPY | Facility: CLINIC | Age: 38
End: 2018-09-06
Attending: PHYSICIAN ASSISTANT
Payer: MEDICAID

## 2018-09-06 VITALS
HEIGHT: 62 IN | OXYGEN SATURATION: 98 % | HEART RATE: 82 BPM | SYSTOLIC BLOOD PRESSURE: 98 MMHG | WEIGHT: 282.9 LBS | DIASTOLIC BLOOD PRESSURE: 61 MMHG | BODY MASS INDEX: 52.06 KG/M2 | TEMPERATURE: 98 F

## 2018-09-06 VITALS
TEMPERATURE: 98.3 F | SYSTOLIC BLOOD PRESSURE: 120 MMHG | HEART RATE: 70 BPM | DIASTOLIC BLOOD PRESSURE: 63 MMHG | OXYGEN SATURATION: 98 %

## 2018-09-06 DIAGNOSIS — E66.01 MORBID (SEVERE) OBESITY DUE TO EXCESS CALORIES (H): ICD-10-CM

## 2018-09-06 DIAGNOSIS — E86.0 DEHYDRATION: Primary | ICD-10-CM

## 2018-09-06 DIAGNOSIS — Z98.84 S/P LAPAROSCOPIC SLEEVE GASTRECTOMY: ICD-10-CM

## 2018-09-06 DIAGNOSIS — E86.0 DEHYDRATION: ICD-10-CM

## 2018-09-06 LAB
ERYTHROCYTE [DISTWIDTH] IN BLOOD BY AUTOMATED COUNT: 13.7 % (ref 10–15)
HCT VFR BLD AUTO: 38.6 % (ref 35–47)
HGB BLD-MCNC: 12.7 G/DL (ref 11.7–15.7)
MCH RBC QN AUTO: 28.6 PG (ref 26.5–33)
MCHC RBC AUTO-ENTMCNC: 32.9 G/DL (ref 31.5–36.5)
MCV RBC AUTO: 87 FL (ref 78–100)
PLATELET # BLD AUTO: 302 10E9/L (ref 150–450)
RBC # BLD AUTO: 4.44 10E12/L (ref 3.8–5.2)
WBC # BLD AUTO: 5.3 10E9/L (ref 4–11)

## 2018-09-06 PROCEDURE — 25000128 H RX IP 250 OP 636: Mod: ZF | Performed by: PHYSICIAN ASSISTANT

## 2018-09-06 PROCEDURE — 85027 COMPLETE CBC AUTOMATED: CPT | Performed by: PHYSICIAN ASSISTANT

## 2018-09-06 PROCEDURE — 25000125 ZZHC RX 250: Mod: ZF | Performed by: PHYSICIAN ASSISTANT

## 2018-09-06 PROCEDURE — 96365 THER/PROPH/DIAG IV INF INIT: CPT

## 2018-09-06 PROCEDURE — 96361 HYDRATE IV INFUSION ADD-ON: CPT

## 2018-09-06 RX ORDER — URSODIOL 300 MG/1
300 CAPSULE ORAL 2 TIMES DAILY
Qty: 60 CAPSULE | Refills: 5 | Status: SHIPPED | OUTPATIENT
Start: 2018-09-06 | End: 2019-04-10

## 2018-09-06 RX ADMIN — FOLIC ACID: 5 INJECTION, SOLUTION INTRAMUSCULAR; INTRAVENOUS; SUBCUTANEOUS at 14:20

## 2018-09-06 RX ADMIN — SODIUM CHLORIDE 1000 ML: 9 INJECTION, SOLUTION INTRAVENOUS at 13:17

## 2018-09-06 ASSESSMENT — PAIN DESCRIPTION - DESCRIPTORS: DESCRIPTORS: SHARP;BURNING

## 2018-09-06 ASSESSMENT — PAIN SCALES - GENERAL
PAINLEVEL: SEVERE PAIN (6)
PAINLEVEL: EXTREME PAIN (8)

## 2018-09-06 NOTE — NURSING NOTE
"( No chief complaint on file.   )    ( Weight: 282 lb 14.4 oz )  ( Height: 5' 2.4\" )  ( BMI (Calculated): 51.19 )  ( Initial Weight: 287 lb 4.8 oz )  ( Cumulative weight loss (lbs): 4.4 )  ( Last Visits Weight: 283 lb 6.4 oz )  ( Wt change since last visit (lbs): -0.5 )  (   )  (   )    ( BP: 98/61 )  (   )  ( Temp: 98  F (36.7  C) )  ( Temp src: Oral )  ( Pulse: 82 )  (   )  ( SpO2: 98 % )    (   Patient Active Problem List   Diagnosis     Ventral hernia without obstruction or gangrene     Morbid obesity (H)     Depression     Morbid (severe) obesity due to excess calories (H)    )  (   Current Outpatient Prescriptions   Medication Sig Dispense Refill     acetaminophen (TYLENOL) 325 MG tablet Take 2 tablets (650 mg) by mouth every 6 hours as needed for other (multimodal surgical pain management along with NSAIDS and opioid medication as indicated based on pain control and physical function.) 30 tablet 0     FLUoxetine (PROZAC) 40 MG capsule Take 40 mg by mouth every morning        hyoscyamine (ANASPAZ/LEVSIN) 0.125 MG tablet Take 1-2 tablets (125-250 mcg) by mouth every 4 hours as needed for cramping 60 tablet 0     levothyroxine (SYNTHROID/LEVOTHROID) 75 MCG tablet Take 75 mcg by mouth every morning        ondansetron (ZOFRAN-ODT) 4 MG ODT tab Take 1 tablet (4 mg) by mouth every 4 hours as needed for nausea or vomiting 30 tablet 0     oxyCODONE IR (ROXICODONE) 5 MG tablet Take 5 mg by mouth every 8 hours as needed   0     senna-docusate (SENOKOT-S;PERICOLACE) 8.6-50 MG per tablet Take 1 tablet by mouth 2 times daily 30 tablet 0     topiramate (TOPAMAX) 25 MG tablet Take 2 tablets (50 mg) by mouth 2 times daily 120 tablet 1     Cholecalciferol (D3-1000) 1000 UNITS CAPS Take 3,000 Units by mouth every morning        FIBER PO Take 5 capsules by mouth every morning       hydrOXYzine (ATARAX) 50 MG tablet Take  mg by mouth as needed        omeprazole (PRILOSEC) 20 MG CR capsule Take 1 capsule (20 mg) by mouth 2 " times daily (Patient not taking: Reported on 9/6/2018) 60 capsule 0    )  ( Diabetes Eval:    )    ( Pain Eval:  Severe Pain (6) )    ( Wound Eval:       )    (   History   Smoking Status     Former Smoker     Packs/day: 1.00     Years: 20.00     Types: Cigarettes     Start date: 1/1/1998     Quit date: 5/8/2016   Smokeless Tobacco     Never Used    )    ( Signed By:  Bee Manzano; September 6, 2018; 10:58 AM )

## 2018-09-06 NOTE — PROGRESS NOTES
Nursing Note  Brianna Brewer [facility] for:   Chief Complaint   Patient presents with     Infusion     IV fluids     During today's [facility] appointment, orders from Renetta Rossi PA-C  were completed.  Frequency: as needed    Progress note:  Patient identification verified by name and date of birth.  Assessment completed.  Vitals recorded in Doc Flowsheets.  Patient was provided with education regarding infusion and possible side effects.  Patient verbalized understanding.      needed: No  Premedications: were not ordered.  Infusion Rates: 999 ml/hr., over 1 hour each liter  Approximate Infusion length:3 hours.   Labs: were drawn per orders.   Vascular access: peripheral IV placed today.  Treatment Conditions: patient denies fever, chills, signs of infection, recent illness, on antibiotics, productive cough or elevated temperature.  Patient tolerated infusion: well.    Drug Waste Record? No     Discharge Plan:   Follow up plan of care with: ongoing infusions at [facility].  Discharge instructions were reviewed with patient.  Patient/representative verbalized understanding of discharge instructions and all questions answered.  Patient discharged from [facility] in stable condition.    Sofia Cristina RN    Administrations This Visit     0.9% sodium chloride BOLUS     Admin Date Action Dose Route Administered By             09/06/2018 New Bag 1000 mL Intravenous Sofia Cristina RN                    sodium chloride 0.9 % 1,000 mL with INFUVITE ADULT 10 mL, thiamine 100 mg, folic acid 1 mg infusion     Admin Date Action Dose Rate Route Administered By          09/06/2018 New Bag   1,111 mL/hr Intravenous Fanny Mendez RN                         /40  Pulse 67  Temp 98.3  F (36.8  C) (Oral)  SpO2 98%

## 2018-09-06 NOTE — PATIENT INSTRUCTIONS
Goals:  1) Follow bariatric low-fat full liquid diet through day 13 post-op, then to progress to pureed diet x 2 weeks(9/11).  If tolerating, may advance on day 29 post-op to bariatric soft diet(9/25).   2) Work towards 60 gm protein/day.  3) Consume 48-64 oz fluids daily- between meals.  4) Eat slowly (>20 min/meal), chewing well to smooth consistency once on the bariatric soft diet.  5) Limit portions to 1/2 cup/meal.  6) Start chewable/liquid multivitamin/minerals twice daily.    Follow up with RD in one month    Bernie Dalton RD, LD  If you need to schedule or reschedule with a dietitian please call [phone] or [phone].

## 2018-09-06 NOTE — PROGRESS NOTES
Nutrition Assessment  Reason For Visit:  rBianna Brewer is a 37 year old female presenting today for nutrition follow-up, 1 week s/p SG with Dr Swanson(8/28/18).  Patient referred by Renetta LINK(9/6/18).    Anthropometrics  Initial Consult Weight: 287.3 lbs  Day of Surgery Weight: 281.3 lbs  Current Weight: 282.9 lbs  Weight loss: -4.4 lbs from initial consult; +1.6 from day of surgery    Current Vitamins/Minerals: none    Nutrition History  Pt reports consuming and tolerating bariatric clear and low-fat full liquid diets. Fluid intake appears adequate, consuming 48-64 oz/day.    Nutrition Prescription:  Grams Protein: 60 (minimum)  Amount of Fluid: 48-64 oz    Nutrition Diagnosis  Food and nutrition-related knowledge deficit r/t lack of prior exposure to diet advancements beyond bariatric low-fat full liquid diet aeb pt unable to verbalize understanding of bariatric pureed and soft diets.    Intervention  Intervention At Appointment:  Materials/education provided on bariatric pureed and soft diets, protein intake, fluid intake, eating pace, portion control, avoiding excess sugar and fat, recommended vitamin/mineral supplements.  Patient is a picky eater and stated she will only eat chicken and potatoes on the pureed diet.  Patient to get IV fluid, encouraged continued water intake.    Patient Understanding: good  Expected Compliance: fair/good    Goals:  1) Follow bariatric low-fat full liquid diet through day 13 post-op, then to progress to pureed diet x 2 weeks(9/11).  If tolerating, may advance on day 29 post-op to bariatric soft diet(9/25).   2) Work towards 60 gm protein/day.  3) Consume 48-64 oz fluids daily- between meals.  4) Eat slowly (>20 min/meal), chewing well to smooth consistency once on the bariatric soft diet.  5) Limit portions to 1/2 cup/meal.  6) Start chewable/liquid multivitamin/minerals twice daily.    Follow-Up: 3 weeks or prn    Time spent with patient: 30 minutes.  Bernie MARIE  SREEKANTH Dalton, ZEINA

## 2018-09-06 NOTE — MR AVS SNAPSHOT
MRN:[id number]                      After Visit Summary   9/6/2018    Brianna Brewer    MRN: [id number]           Visit Information        Provider Department      9/6/2018 12:00 PM Bernie Dalton RD [facility]        Your next 10 appointments already scheduled     Sep 06, 2018  1:00 PM CDT   Infusion 180 with UC SPEC INFUSION, UC 45 ATC   [facility] ([facility] [place])    [address]  [address]  [facility] [address]   [phone]            Oct 04, 2018 11:40 AM CDT   (Arrive by 11:25 AM)   Bariatric Post Op Global Visit with Zoran Swanson MD   [facility] ([facility])    [address]  [address] [facility] [address]   [phone]            Oct 04, 2018 12:00 PM CDT   (Arrive by 11:45 AM)   Return Bariatric Nutrition Visit with SREEKANTH [facility] ([facility])    [address]  [address] [facility] [address] [phone]              Care Instructions    Goals:  1) Follow bariatric low-fat full liquid diet through day 13 post-op, then to progress to pureed diet x 2 weeks(9/11).  If tolerating, may advance on day 29 post-op to bariatric soft diet(9/25).   2) Work towards 60 gm protein/day.  3) Consume 48-64 oz fluids daily- between meals.  4) Eat slowly (>20 min/meal), chewing well to smooth consistency once on the bariatric soft diet.  5) Limit portions to 1/2 cup/meal.  6) Start chewable/liquid multivitamin/minerals twice daily.    Follow up with RD in one month    Bernie Dalton RD, LD  If you need to schedule or reschedule with a dietitian please call [phone] or [phone].          MyChart Information     [employer] gives you secure access to your electronic health record. If you see a primary care provider, you can also send  messages to your care team and make appointments. If you have questions, please call your primary care clinic.  If you do not have a primary care provider, please call [phone] and they will assist you.      [employer] is an electronic gateway that provides easy, online access to your medical records. With [employer], you can request a clinic appointment, read your test results, renew a prescription or communicate with your care team.     To access your existing account, please contact your [facility] or call [phone] for assistance.        Care EveryWhere ID     This is your Care EveryWhere ID. This could be used by other organizations to access your [place] medical records  [id number]        Equal Access to Services     JANICE HARDIN : Nabila Zuñiga, julian crockett, kwabena minor, cydney hoskins. So [facility] [phone].    ATENCIÓN: Si habla español, tiene a parish disposición servicios gratuitos de asistencia lingüística. Llame al [phone].    We comply with applicable federal civil rights laws and [place] laws. We do not discriminate on the basis of race, color, national origin, age, disability, sex, sexual orientation, or gender identity.

## 2018-09-06 NOTE — PROGRESS NOTES
"Postoperative bariatric surgery visit.    Patient underwent sleeve gastrectomy 9 days ago.      Tolerating liquids: yes but not adequate amounts  Lightheadedness: yes  Abdominal pain: yes  Bowel movements: constipated. No BM for 6-7 days  Fevers/shakes/chills: no    BP 98/61  Pulse 82  Temp 98  F (36.7  C) (Oral)  Ht 5' 2.4\"  Wt 282 lb 14.4 oz  SpO2 98%  BMI 51.08 kg/m2  NAD  Overall looks dehydrated  Incisions c/d/i; 2 incisions slightly open, no signs of infection or drainage.    Plan:  1. RD visit today.  2. Start vitamin supplements per RD directions.  3. Advance diet per RD directions.  4. Bariatric IV fluids. Dehydration protocol today second floor  5. Follow-up: 3 weeks  6. Miralax for constipation  7. CBC normal today      Renetta Rossi PA-C  Surgical and Medical Weight Management       "

## 2018-09-06 NOTE — MR AVS SNAPSHOT
After Visit Summary   9/6/2018    Brianna Brewer    MRN: [id number]           Patient Information     Date Of Birth          1980        Visit Information        Provider Department      9/6/2018 11:00 AM Renetta Rossi PA-C [facility] Surgical Weight Management        Today's Diagnoses     Dehydration        S/P laparoscopic sleeve gastrectomy        Morbid (severe) obesity due to excess calories (H)           Follow-ups after your visit        Your next 10 appointments already scheduled     Oct 04, 2018 11:40 AM CDT   (Arrive by 11:25 AM)   Bariatric Post Op Global Visit with Zoran Swanson MD   [facility] Surgical Weight Management ([facility] [place])    [address] [place] [address] [phone]            Oct 04, 2018 12:00 PM CDT   (Arrive by 11:45 AM)   Return Bariatric Nutrition Visit with Bernie Dalton RD   [facility] Surgical Weight Management ([facility] [place])    [address] [place] [address] [phone]              Who to contact     Please call your clinic at [phone] to:    Ask questions about your health    Make or cancel appointments    Discuss your medicines    Learn about your test results    Speak to your doctor            Additional Information About Your Visit        [employer] Information     [employer] gives you secure access to your electronic health record. If you see a primary care provider, you can also send messages to your care team and make appointments. If you have questions, please call your primary care clinic.  If you do not have a primary care provider, please call [phone] and they will assist you.      [employer] is an electronic gateway that provides easy, online access to your medical records. With [employer], you can request a clinic appointment, read your test results, renew a prescription or communicate with your care team.     To access  "your existing account, please contact your [facility] or call [phone] for assistance.        Care EveryWhere ID     This is your Care EveryWhere ID. This could be used by other organizations to access your [place] medical records  [id number]        Your Vitals Were     Pulse Temperature Height Pulse Oximetry BMI (Body Mass Index)       82 98  F (36.7  C) (Oral) 5' 2.4\" 98% 51.08 kg/m2        Blood Pressure from Last 3 Encounters:   09/06/18 109/40   09/06/18 98/61   08/29/18 100/45    Weight from Last 3 Encounters:   09/06/18 282 lb 14.4 oz   08/28/18 281 lb 4.9 oz   07/14/18 287 lb 4.8 oz                 Today's Medication Changes          These changes are accurate as of 9/6/18  2:03 PM.  If you have any questions, ask your nurse or doctor.               Start taking these medicines.        Dose/Directions    ursodiol 300 MG capsule   Commonly known as:  ACTIGALL   Used for:  S/P laparoscopic sleeve gastrectomy   Started by:  Renetta Rossi PA-C        Dose:  300 mg   Take 1 capsule (300 mg) by mouth 2 times daily   Quantity:  60 capsule   Refills:  5            Where to get your medicines      These medications were sent to [id number] - [address] [address]     Phone:  [phone]     ursodiol 300 MG capsule                Primary Care Provider Office Phone # Fax #    Yaya Kramer [phone]       [address] [address]        Equal Access to Services     JANICE HARDIN AH: Hadvandana Zuñiga, waaxda lupetros, qaybta kaalcydeny baez. So [facility] [phone].    ATENCIÓN: Si habla español, tiene a parish disposición servicios gratuitos de asistencia lingüística. Llame al [phone].    We comply with applicable federal civil rights laws and [place] laws. We do not discriminate on the basis of " race, color, national origin, age, disability, sex, sexual orientation, or gender identity.            Thank you!     Thank you for choosing [facility] SURGICAL WEIGHT MANAGEMENT  for your care. Our goal is always to provide you with excellent care. Hearing back from our patients is one way we can continue to improve our services. Please take a few minutes to complete the written survey that you may receive in the mail after your visit with us. Thank you!             Your Updated Medication List - Protect others around you: Learn how to safely use, store and throw away your medicines at www.disposemymeds.org.          This list is accurate as of 9/6/18  2:03 PM.  Always use your most recent med list.                   Brand Name Dispense Instructions for use Diagnosis    acetaminophen 325 MG tablet    TYLENOL    30 tablet    Take 2 tablets (650 mg) by mouth every 6 hours as needed for other (multimodal surgical pain management along with NSAIDS and opioid medication as indicated based on pain control and physical function.)    Morbid obesity (H)       D3-1000 1000 units Caps      Take 3,000 Units by mouth every morning        FIBER PO      Take 5 capsules by mouth every morning        FLUoxetine 40 MG capsule    PROzac     Take 40 mg by mouth every morning        hydrOXYzine 50 MG tablet    ATARAX     Take  mg by mouth as needed        hyoscyamine 0.125 MG tablet    ANASPAZ/LEVSIN    60 tablet    Take 1-2 tablets (125-250 mcg) by mouth every 4 hours as needed for cramping    Morbid obesity (H)       levothyroxine 75 MCG tablet    SYNTHROID/LEVOTHROID     Take 75 mcg by mouth every morning        omeprazole 20 MG CR capsule    priLOSEC    60 capsule    Take 1 capsule (20 mg) by mouth 2 times daily    Morbid obesity (H)       ondansetron 4 MG ODT tab    ZOFRAN-ODT    30 tablet    Take 1 tablet (4 mg) by mouth every 4 hours as needed for nausea or vomiting    Morbid obesity (H)       oxyCODONE IR 5 MG tablet     ROXICODONE     Take 5 mg by mouth every 8 hours as needed        senna-docusate 8.6-50 MG per tablet    SENOKOT-S;PERICOLACE    30 tablet    Take 1 tablet by mouth 2 times daily    Morbid obesity (H)       topiramate 25 MG tablet    TOPAMAX    120 tablet    Take 2 tablets (50 mg) by mouth 2 times daily    Morbid obesity (H)       ursodiol 300 MG capsule    ACTIGALL    60 capsule    Take 1 capsule (300 mg) by mouth 2 times daily    S/P laparoscopic sleeve gastrectomy

## 2018-09-06 NOTE — LETTER
"9/6/2018       RE: Brianna Brewer  [address] [facility] [address]     Dear Colleague,    Thank you for referring your patient, Brianna Brewer, to the [facility] SURGICAL WEIGHT MANAGEMENT at [facility]. Please see a copy of my visit note below.    Postoperative bariatric surgery visit.  Patient underwent sleeve gastrectomy 9 days ago.      Tolerating liquids: yes but not adequate amounts  Lightheadedness: yes  Abdominal pain: yes  Bowel movements: constipated. No BM for 6-7 days  Fevers/shakes/chills: no    BP 98/61  Pulse 82  Temp 98  F (36.7  C) (Oral)  Ht 5' 2.4\"  Wt 282 lb 14.4 oz  SpO2 98%  BMI 51.08 kg/m2  NAD  Overall looks dehydrated  Incisions c/d/i; 2 incisions slightly open, no signs of infection or drainage.    Plan:  1. RD visit today.  2. Start vitamin supplements per RD directions.  3. Advance diet per RD directions.  4. Bariatric IV fluids. Dehydration protocol today second floor  5. Follow-up: 3 weeks  6. Miralax for constipation  7. CBC normal today  Renetta Rossi PA-C  Surgical and Medical Weight Management   "

## 2018-09-06 NOTE — LETTER
9/6/2018       RE: Brianna Brewer  [address] [address]     Dear Colleague,    Thank you for referring your patient, Brianna Brewer, to the [facility] SURGICAL WEIGHT MANAGEMENT at [facility]. Please see a copy of my visit note below.    Nutrition Assessment  Reason For Visit:  Brianna Brewer is a 37 year old female presenting today for nutrition follow-up, 1 week s/p SG with Dr Swanson(8/28/18).  Patient referred by Renetta LINK(9/6/18).    Anthropometrics  Initial Consult Weight: 287.3 lbs  Day of Surgery Weight: 281.3 lbs  Current Weight: 282.9 lbs  Weight loss: -4.4 lbs from initial consult; +1.6 from day of surgery    Current Vitamins/Minerals: none    Nutrition History  Pt reports consuming and tolerating bariatric clear and low-fat full liquid diets. Fluid intake appears adequate, consuming 48-64 oz/day.    Nutrition Prescription:  Grams Protein: 60 (minimum)  Amount of Fluid: 48-64 oz    Nutrition Diagnosis  Food and nutrition-related knowledge deficit r/t lack of prior exposure to diet advancements beyond bariatric low-fat full liquid diet aeb pt unable to verbalize understanding of bariatric pureed and soft diets.    Intervention  Intervention At Appointment:  Materials/education provided on bariatric pureed and soft diets, protein intake, fluid intake, eating pace, portion control, avoiding excess sugar and fat, recommended vitamin/mineral supplements.  Patient is a picky eater and stated she will only eat chicken and potatoes on the pureed diet.  Patient to get IV fluid, encouraged continued water intake.    Patient Understanding: good  Expected Compliance: fair/good    Goals:  1) Follow bariatric low-fat full liquid diet through day 13 post-op, then to progress to pureed diet x 2 weeks(9/11).  If tolerating, may advance on day 29 post-op to bariatric soft diet(9/25).   2) Work towards 60 gm protein/day.  3) Consume 48-64 oz  fluids daily- between meals.  4) Eat slowly (>20 min/meal), chewing well to smooth consistency once on the bariatric soft diet.  5) Limit portions to 1/2 cup/meal.  6) Start chewable/liquid multivitamin/minerals twice daily.    Follow-Up: 3 weeks or prn    Time spent with patient: 30 minutes.    Bernie Dalton, RD, LD

## 2018-09-06 NOTE — MR AVS SNAPSHOT
After Visit Summary   9/6/2018    Brianna Brewer    MRN: [id number]           Patient Information     Date Of Birth          1980        Visit Information        Provider Department      9/6/2018 1:00 PM [facility] ATC; [facility]        Today's Diagnoses     Dehydration    -  1    S/P laparoscopic sleeve gastrectomy        Morbid (severe) obesity due to excess calories (H)           Follow-ups after your visit        Your next 10 appointments already scheduled     Oct 04, 2018 11:40 AM CDT   (Arrive by 11:25 AM)   Bariatric Post Op Global Visit with Zoran Swanson MD   [facility] Surgical Weight Management ([facility])    [address] [place] [address] [phone]            Oct 04, 2018 12:00 PM CDT   (Arrive by 11:45 AM)   Return Bariatric Nutrition Visit with Bernie Dalton RD   [facility] Surgical Weight Management ([facility])    [address] [place] [address] [phone]              Who to contact     If you have questions or need follow up information about today's clinic visit or your schedule please contact [facility] SPECIALTY AND PROCEDURE directly at [phone].  Normal or non-critical lab and imaging results will be communicated to you by [employer]St[employer]art, letter or phone within 4 business days after the clinic has received the results. If you do not hear from us within 7 days, please contact the clinic through [employer]Stratushart or phone. If you have a critical or abnormal lab result, we will notify you by phone as soon as possible.  Submit refill requests through [employer] or call your pharmacy and they will forward the refill request to us. Please allow 3 business days for your refill to be completed.          Additional Information About Your Visit        [employer] Information     [employer] gives  you secure access to your electronic health record. If you see a primary care provider, you can also send messages to your care team and make appointments. If you have questions, please call your primary care clinic.  If you do not have a primary care provider, please call [phone] and they will assist you.        Care EveryWhere ID     This is your Care EveryWhere ID. This could be used by other organizations to access your [place] medical records  [id number]        Your Vitals Were     Pulse Temperature Pulse Oximetry             70 98.3  F (36.8  C) (Oral) 98%          Blood Pressure from Last 3 Encounters:   09/06/18 120/63   09/06/18 98/61   08/29/18 100/45    Weight from Last 3 Encounters:   09/06/18 128.3 kg (282 lb 14.4 oz)   08/28/18 127.6 kg (281 lb 4.9 oz)   07/14/18 130.3 kg (287 lb 4.8 oz)              We Performed the Following     CBC with platelets          Today's Medication Changes          These changes are accurate as of 9/6/18  3:56 PM.  If you have any questions, ask your nurse or doctor.               Start taking these medicines.        Dose/Directions    ursodiol 300 MG capsule   Commonly known as:  ACTIGALL   Used for:  S/P laparoscopic sleeve gastrectomy   Started by:  Renetta Rossi PA-C        Dose:  300 mg   Take 1 capsule (300 mg) by mouth 2 times daily   Quantity:  60 capsule   Refills:  5            Where to get your medicines      These medications were sent to [facility] [address] [address]     Phone:  [phone]     ursodiol 300 MG capsule                Primary Care Provider Office Phone # Fax #    Yaya Kramer [phone]       [address] [address]        Equal Access to Services     JANICE HARDIN AH: Nabila Zuñiga, wafito luqadaha, qaybta kaalmamaryann minor, cydney hoskins. So [facility]  [phone].    ATENCIÓN: Si tommy go, tiene a parish disposición servicios gratuitos de asistencia lingüística. Des bone [phone].    We comply with applicable federal civil rights laws and [place] laws. We do not discriminate on the basis of race, color, national origin, age, disability, sex, sexual orientation, or gender identity.            Thank you!     Thank you for choosing [facility] SPECIALTY AND PROCEDURE  for your care. Our goal is always to provide you with excellent care. Hearing back from our patients is one way we can continue to improve our services. Please take a few minutes to complete the written survey that you may receive in the mail after your visit with us. Thank you!             Your Updated Medication List - Protect others around you: Learn how to safely use, store and throw away your medicines at www.disposemymeds.org.          This list is accurate as of 9/6/18  3:56 PM.  Always use your most recent med list.                   Brand Name Dispense Instructions for use Diagnosis    acetaminophen 325 MG tablet    TYLENOL    30 tablet    Take 2 tablets (650 mg) by mouth every 6 hours as needed for other (multimodal surgical pain management along with NSAIDS and opioid medication as indicated based on pain control and physical function.)    Morbid obesity (H)       D3-1000 1000 units Caps      Take 3,000 Units by mouth every morning        FIBER PO      Take 5 capsules by mouth every morning        FLUoxetine 40 MG capsule    PROzac     Take 40 mg by mouth every morning        hydrOXYzine 50 MG tablet    ATARAX     Take  mg by mouth as needed        hyoscyamine 0.125 MG tablet    ANASPAZ/LEVSIN    60 tablet    Take 1-2 tablets (125-250 mcg) by mouth every 4 hours as needed for cramping    Morbid obesity (H)       levothyroxine 75 MCG tablet    SYNTHROID/LEVOTHROID     Take 75 mcg by mouth every morning        omeprazole 20 MG CR capsule    priLOSEC    60  capsule    Take 1 capsule (20 mg) by mouth 2 times daily    Morbid obesity (H)       ondansetron 4 MG ODT tab    ZOFRAN-ODT    30 tablet    Take 1 tablet (4 mg) by mouth every 4 hours as needed for nausea or vomiting    Morbid obesity (H)       oxyCODONE IR 5 MG tablet    ROXICODONE     Take 5 mg by mouth every 8 hours as needed        senna-docusate 8.6-50 MG per tablet    SENOKOT-S;PERICOLACE    30 tablet    Take 1 tablet by mouth 2 times daily    Morbid obesity (H)       topiramate 25 MG tablet    TOPAMAX    120 tablet    Take 2 tablets (50 mg) by mouth 2 times daily    Morbid obesity (H)       ursodiol 300 MG capsule    ACTIGALL    60 capsule    Take 1 capsule (300 mg) by mouth 2 times daily    S/P laparoscopic sleeve gastrectomy

## 2018-09-18 ENCOUNTER — TELEPHONE (OUTPATIENT)
Dept: CALL CENTER | Age: 38
End: 2018-09-18

## 2018-09-18 NOTE — TELEPHONE ENCOUNTER
"[facility]: Nurse Triage Note  SITUATION/BACKGROUND                                                      Brianna Brewer is a 37 year old female who is post laparoscopic gastric sleeve 8/28/18 calling Dr. Swanson with a many symptoms.    Description:  First noted today, numbness on her left shin area and the top of her left foot.  Also a little swelling of her left foot \"feels tight\", not her right foot though. Had a headache earlier today, but not now.  Feels overall weak, denies extremity related weakness, more all over. Denies pain in her calf now also no pain with dorsi flexion or plantar flexion.  Denies leg is warm or red, just slightly swollen.     Also complaining of being dizzy, having double vision and being unable to eat.  Not new symptoms, has been seen by PCP 1 week ago for this. BP has been low. 90- low 100 systolic .  She reports labs were OK at primary appointment.  \"Diarrhea, dizzy and fuzzy headed\" is constant not just after she eats.  Is wondering if she has dumping syndrome?  She cut sugar out of her diet but symptoms still occurring.  Gets full after a bite.  Trying to drink protein shakes, but gets nauseated.  Has only lost 2# since surgery which is discouraging and concerning for her.  Has voided just 2 times today, more volume this AM, just a little this afternoon.  Had a doctors appointment in [place] this morning, so didn't drink much and slept on way up and back in the car which she states is unusual for her.  Onset/duration:  As above  Precip. factors:  As above  Associated symptoms:  As above  Improves/worsens symptoms:  As above  Pain scale (0-10)   6-7/10 now after pain medication.. 7-8-9/10 otherwise.  \"Belly pain\", \"I have a gynormous hernia\".  Stomach, belly and hernia pain all worse after surgery.    MEDICATIONS:   Taking medication(s) as prescribed? Yes  Taking over the counter medication(s?) Yes  Any barriers to taking medication(s) as prescribed?  " No  Medication(s) improving/managing symptoms?  No    Allergies: No Known Allergies    ASSESSMENT      Needs evaluation for concerning possibly serious symptoms.    RECOMMENDATION/PLAN                                                      RECOMMENDED DISPOSITION: I called and spoke with Celia in Surgery Clinic.  She is in agreement that Brianna needs evaluation today either by PCP locally or ED locally.   Will comply with recommendation: Yes, I also advised she move up her appointment in Bariatric Clinic here.  She states child coming home on the bus shortly, then friend can give her a ride to the clinic / ED.      If further questions/concerns or if symptoms do not improve, worsen or new symptoms develop, call your PCP or [phone] to talk with the Resident on call, as soon as possible.    Guideline used: Numbness and tingling page 433  Telephone Triage Protocols for Nurses, Fifth Edition, Mariela Kent RN

## 2018-09-20 ENCOUNTER — CARE COORDINATION (OUTPATIENT)
Dept: SURGERY | Facility: CLINIC | Age: 38
End: 2018-09-20

## 2018-09-20 NOTE — PROGRESS NOTES
Left message for patient to call. Checking to see how she is feeling after talking with Dr. Swanson and Renetta alejandro Do to triage call.

## 2018-09-21 ENCOUNTER — CARE COORDINATION (OUTPATIENT)
Dept: SURGERY | Facility: CLINIC | Age: 38
End: 2018-09-21

## 2018-09-28 ENCOUNTER — CARE COORDINATION (OUTPATIENT)
Dept: SURGERY | Facility: CLINIC | Age: 38
End: 2018-09-28

## 2018-09-28 NOTE — PROGRESS NOTES
"Patient notified she would be seeing Lauren SCHAFFER RN for follow up on Thursday 4th and not Dr. Swanson. Patient stated she was \"ok\" with that.  "

## 2018-10-04 ENCOUNTER — OFFICE VISIT (OUTPATIENT)
Dept: SURGERY | Facility: CLINIC | Age: 38
End: 2018-10-04
Payer: MEDICAID

## 2018-10-04 ENCOUNTER — ALLIED HEALTH/NURSE VISIT (OUTPATIENT)
Dept: SURGERY | Facility: CLINIC | Age: 38
End: 2018-10-04
Payer: MEDICAID

## 2018-10-04 VITALS
DIASTOLIC BLOOD PRESSURE: 58 MMHG | WEIGHT: 272.6 LBS | HEART RATE: 87 BPM | TEMPERATURE: 98.2 F | BODY MASS INDEX: 49.22 KG/M2 | SYSTOLIC BLOOD PRESSURE: 111 MMHG | OXYGEN SATURATION: 97 %

## 2018-10-04 DIAGNOSIS — Z98.84 STATUS POST BARIATRIC SURGERY: Primary | ICD-10-CM

## 2018-10-04 DIAGNOSIS — E66.01 MORBID OBESITY (H): ICD-10-CM

## 2018-10-04 RX ORDER — ONDANSETRON 4 MG/1
4 TABLET, ORALLY DISINTEGRATING ORAL EVERY 4 HOURS PRN
Qty: 30 TABLET | Refills: 0 | Status: SHIPPED | OUTPATIENT
Start: 2018-10-04 | End: 2020-05-19

## 2018-10-04 RX ORDER — CYANOCOBALAMIN 1000 UG/ML
1 INJECTION, SOLUTION INTRAMUSCULAR; SUBCUTANEOUS
Qty: 1 ML | Refills: 11 | Status: SHIPPED | OUTPATIENT
Start: 2018-10-04 | End: 2019-09-26

## 2018-10-04 RX ORDER — CYANOCOBALAMIN 1000 UG/ML
1 INJECTION, SOLUTION INTRAMUSCULAR; SUBCUTANEOUS ONCE
Qty: 1 ML | Refills: 0 | OUTPATIENT
Start: 2018-10-04 | End: 2018-10-04

## 2018-10-04 NOTE — NURSING NOTE
The following medication was given:     MEDICATION: Vitamin B12  1000 mcg  ROUTE: SQ  SITE: RUQ - Abd.  DOSE: 1 ml  LOT #: [id number]  :  [employer]  EXPIRATION DATE:  03/2020  NDC#: 6609-3991-96

## 2018-10-04 NOTE — PROGRESS NOTES
"  [facility]: Nurse (RN) Visit Note  SITUATION/BACKGROUND                                                      Patient is a 37 year old female who underwent sleeve gastrectomy 5 weeks ago.    Initial weight 287  Weight day of surgery 281  Today's weight  272.6lb    Tolerating liquids: tolerating clear liquids, but not full liquids. some nausea using ondansetron which is helpful, 2 episodes of post prandial emesis, a couple other times where she was dry heaving  Lightheadedness: some dizziness, no lightheadedness, no syncope.  symptoms consistent since surgery, less frequent now  Abdominal pain: pain immediately after meals (anything more than clear liquids), epigastric, \"sharp\" \"debilitating\" pain that lasts 10-15 minutes, more so when up and walking, gradually fades away. Doesn't seem to be associated with size of meal. \"No apatite\" and not able to do more than clear liquids for the 2-3 days.  Intermittent incisional pain very randomly. Not taking Levsin, pain medication, omeprazole regularly    At baseline has chronic pain with hernia.   Bowel movements: Last bowel movement 2 days ago, somewhat difficult to pass, taking stool softeners and Doculax,   Fevers/shakes/chills: none   GERD: \"Randomly\" will take omeprazole as needed, helpful when she does    Low blood pressure readings 2 weeks ago (90/60). Improving over time. IV fluids 9/6 for dehydration. Seen by primary care clinic since then, was not dehydrated that day. Per dietician note, taking in adequate clear liquids       ASSESSMENT      Vital Signs:  /58 (BP Location: Left arm, Patient Position: Sitting, Cuff Size: Adult Large)  Pulse 87  Temp 98.2  F (36.8  C)  Wt 272 lb 9.6 oz  SpO2 97%  BMI 49.22 kg/m2     Exam:  NAD  Overall looks well  Incisions c/d/i; soft, non-tender      RECOMMENDATION/PLAN                                                      Plan:    1. RD visit today.  2. Start vitamin supplements per RD " directions.  3. Advance diet per RD directions.  4. Follow-up: 2 months. Labs day of appointment.   5. B12 injection today, will be monthly. Prescription sent to pharmacy. Can come in here to have us give or do at home.   6. Take Omeprazole daily  7. Take Levsin more often  9. Hold Actigall until feeling better  10. Touch base with us in 2 week regarding how you are feeling    Today's visit was:  Ordered or requested by: NIKOLAY Hameed. Supervising provider: nikolay Hameed who was in clinic and available if needed.       Lauren Tyler RN

## 2018-10-04 NOTE — PROGRESS NOTES
Nutrition Reassessment  Reason For Visit:  Brianna Brewer is a 37 year old female presenting today for nutrition follow-up, 1 month s/p SG with Dr Swanson(8/28/18).  Patient referred by Renetta LINK(9/6/18).    Anthropometrics:  Initial Consult Weight: 287.3 lbs  Day of Surgery Weight: 281.3 lbs  Current Weight: 272.6 lbs    Weight loss: -14.7 lbs from initial consult; -8.7 from day of surgery    Current Vitamins/Minerals: MVI/minerals BID    Nutrition History:  Tea, crystal light  Chicken - one meal per day  Progress with Previous Goals:  1) Follow bariatric low-fat full liquid diet through day 13 post-op, then to progress to pureed diet x 2 weeks(9/11).  If tolerating, may advance on day 29 post-op to bariatric soft diet(9/25). Not tolerated soft foods per pt, not really following dietary recommendations  2) Work towards 60 gm protein/day. Continues, might have some chicken unable to estimate how much chicken she can eat at a meal  3) Consume 48-64 oz fluids daily- between meals. Met, drinking 4 16 oz mugs of water or tea per day  4) Eat slowly (>20 min/meal), chewing well to smooth consistency once on the bariatric soft diet. continues  5) Limit portions to 1/2 cup/meal. Unable to report portion sizes, pt reported only eating once per day  6) Start chewable/liquid multivitamin/minerals twice daily. Did not start yet, discussed importance of vitamin supplements and provided websites to order if unable to get to the store.    Nutrition Prescription:  Grams Protein: 60 (minimum)  Amount of Fluid: 48-64 oz    Nutrition Diagnosis  Previous: Food and nutrition-related knowledge deficit r/t lack of prior exposure to diet advancements beyond bariatric low-fat full liquid diet aeb pt unable to verbalize understanding of bariatric pureed and soft diets.    Current: Food and nutrition-related knowledge deficit r/t lack of prior exposure to diet advancements beyond bariatric pureed diet aeb pt unable to verbalize  full understanding of bariatric soft and regular consistency diets.    Intervention  Materials/Education provided on bariatric soft and regular consistency diets, protein intake, fluid intake, eating pace, chewing foods well, portion control, sugar/fat intake, recommended vitamin/mineral supplements.     Patient Understanding: fair  Expected Compliance: fair    Goals:  1) Once tolerating soft diet, progress to bariatric regular diet.   2) Consume 60 grams of protein/day.  3) Sip on 48-64 oz of fluids/day- between meals only.  4) Eat slowly (>20 min/meal), chewing foods well (to applesauce-like consistency).  5) Limit portions to 1/2 cup/meal.  6) Take the following supplements:    Multivitamin/minerals: adult dose 2 times daily    Iron: 45-60 mg elemental (18-36 mg if low risk) - may partly or fully be covered in multivitamin     Calcium Citrate containing vitamin D: 500 mg 3 times daily or 600 mg 2 times daily    Vitamin B12: sublingual form of at least 500 mcg daily or injection of 1000 mcg monthly     B-50 Complex once daily        Follow-Up: prn    Time spent with patient: 30 minutes.  Bernie Dalton, RD, LD

## 2018-10-04 NOTE — PATIENT INSTRUCTIONS
Plan:    1. RD visit today.  2. Start vitamin supplements per RD directions.  3. Advance diet per RD directions.  4. Follow-up: 2 months. Labs day of appointment.   5. B12 injection today, will be monthly. Prescription sent to pharmacy. Can come in here to have us give or do at home.   6. Take Omeprazole daily  7. Take Levsin more often  9. Hold Actigall until feeling better  10. Touch base with us in 2 week regarding how you are feeling    It was a pleasure meeting with you today.     Thank you for allowing us the privilege of caring for you. We hope we provided you with the excellent service you deserve.     Please let us know if there is anything else we can do for you so that we can be sure you are leaving completely satisfied with your care experience.    To schedule appointments with our team, please call [phone] option #1    Please call during clinic hours Monday through Friday 8:00a - 4:00p if you have questions or you can contact us via [employer] at anytime.      Main follow-up parameters for all bariatric patients     Patients who have undergone Bariatric surgery:    1. Follow-up interval during the first year: ~1 week, 1 month, 3 month, 6 month,12 months.  Every 6 months until 5 years; and then annually thereafter.  The goal of follow-up sessions is to ensure that food intake behavior (choice of food and eating speed) and exercise/activity level are set appropriately.    2. Yearly lab tests ordered by our clinic.      3. The bariatric team should be aware and potentially evaluate all adverse gastrointestinal symptoms (dysphagia, abdominal pain, nausea, vomiting, diarrhea, heartburn, reflux, etc), which can be a sign of complication.  The bariatric surgeons perform general surgery procedures in addition to bariatric surgery (laparoscopic cholecystectomy, etc.)    4. Inability to tolerate textured food (chicken, steak, fish, eggs, beans) is NOT normal and may need to be evaluated by endoscopy performed  by the bariatric surgeon.

## 2018-10-04 NOTE — MR AVS SNAPSHOT
After Visit Summary   10/4/2018    Brianna Brewer    MRN: [id number]           Patient Information     Date Of Birth          1980        Visit Information        Provider Department      10/4/2018 11:40 AM Nurse,  Surgery General Surgery        Today's Diagnoses     Status post bariatric surgery    -  1    Morbid obesity (H)          Care Instructions    Plan:    1. RD visit today.  2. Start vitamin supplements per RD directions.  3. Advance diet per RD directions.  4. Follow-up: 2 months. Labs day of appointment.   5. B12 injection today, will be monthly. Prescription sent to pharmacy. Can come in here to have us give or do at home.   6. Take Omeprazole daily  7. Take Levsin more often  9. Hold Actigall until feeling better  10. Touch base with us in 2 week regarding how you are feeling    It was a pleasure meeting with you today.     Thank you for allowing us the privilege of caring for you. We hope we provided you with the excellent service you deserve.     Please let us know if there is anything else we can do for you so that we can be sure you are leaving completely satisfied with your care experience.    To schedule appointments with our team, please call [phone] option #1    Please call during clinic hours Monday through Friday 8:00a - 4:00p if you have questions or you can contact us via [employer] at anytime.      Main follow-up parameters for all bariatric patients     Patients who have undergone Bariatric surgery:    1. Follow-up interval during the first year: ~1 week, 1 month, 3 month, 6 month,12 months.  Every 6 months until 5 years; and then annually thereafter.  The goal of follow-up sessions is to ensure that food intake behavior (choice of food and eating speed) and exercise/activity level are set appropriately.    2. Yearly lab tests ordered by our clinic.      3. The bariatric team should be aware and potentially evaluate all adverse gastrointestinal symptoms  (dysphagia, abdominal pain, nausea, vomiting, diarrhea, heartburn, reflux, etc), which can be a sign of complication.  The bariatric surgeons perform general surgery procedures in addition to bariatric surgery (laparoscopic cholecystectomy, etc.)    4. Inability to tolerate textured food (chicken, steak, fish, eggs, beans) is NOT normal and may need to be evaluated by endoscopy performed by the bariatric surgeon.                         Follow-ups after your visit        Who to contact     Please call your clinic at [phone] to:    Ask questions about your health    Make or cancel appointments    Discuss your medicines    Learn about your test results    Speak to your doctor            Additional Information About Your Visit        [employer] Information     [employer] gives you secure access to your electronic health record. If you see a primary care provider, you can also send messages to your care team and make appointments. If you have questions, please call your primary care clinic.  If you do not have a primary care provider, please call [phone] and they will assist you.      [employer] is an electronic gateway that provides easy, online access to your medical records. With [employer], you can request a clinic appointment, read your test results, renew a prescription or communicate with your care team.     To access your existing account, please contact your [facility] or call [phone] for assistance.        Care EveryWhere ID     This is your Care EveryWhere ID. This could be used by other organizations to access your [place] medical records  [id number]        Your Vitals Were     Pulse Temperature Pulse Oximetry BMI (Body Mass Index)          87 98.2  F (36.8  C) 97% 49.22 kg/m2         Blood Pressure from Last 3 Encounters:   10/04/18 111/58   09/06/18 120/63   09/06/18 98/61    Weight from Last 3 Encounters:   10/04/18 272 lb 9.6 oz   09/06/18 282 lb 14.4 oz   08/28/18  "281 lb 4.9 oz              Today, you had the following     No orders found for display         Today's Medication Changes          These changes are accurate as of 10/4/18 12:53 PM.  If you have any questions, ask your nurse or doctor.               Start taking these medicines.        Dose/Directions    * cyanocobalamin 1000 MCG/ML injection   Commonly known as:  VITAMIN B12   Used for:  Status post bariatric surgery   Started by:  Nurse [facility]        Dose:  1 mL   Inject 1 mL (1,000 mcg) Subcutaneous once for 1 dose   Quantity:  1 mL   Refills:  0       * cyanocobalamin 1000 MCG/ML injection   Commonly known as:  VITAMIN B12   Used for:  Status post bariatric surgery   Started by:  Nurse [facility]        Dose:  1 mL   Inject 1 mL (1,000 mcg) Subcutaneous every 30 days   Quantity:  1 mL   Refills:  11       Syringe/Needle (Disp) 25G X 5/8\" 3 ML Misc   Commonly known as:  BD ECLIPSE SYRINGE   Used for:  Status post bariatric surgery   Started by:  Nurse [facility]        Dose:  1 Syringe   1 Syringe every 30 days   Quantity:  1 each   Refills:  11       * Notice:  This list has 2 medication(s) that are the same as other medications prescribed for you. Read the directions carefully, and ask your doctor or other care provider to review them with you.         Where to get your medicines      These medications were sent to [facility] [address] [address]     Phone:  [phone]     cyanocobalamin 1000 MCG/ML injection    ondansetron 4 MG ODT tab    Syringe/Needle (Disp) 25G X 5/8\" 3 ML Misc         Some of these will need a paper prescription and others can be bought over the counter.  Ask your nurse if you have questions.     You don't need a prescription for these medications     cyanocobalamin 1000 MCG/ML injection                Primary Care Provider Office Phone # Fax #    Yaya Kramer [phone]       [facility], [place] " [address] [address]        Equal Access to Services     JANICE HARDIN : Hadii jazmine Zuñiga, julian crockett, cydney tinsley. So [facility] [phone].    ATENCIÓN: Si habla español, tiene a parish disposición servicios gratuitos de asistencia lingüística. [facility] [phone].    We comply with applicable federal civil rights laws and [place] laws. We do not discriminate on the basis of race, color, national origin, age, disability, sex, sexual orientation, or gender identity.            Thank you!     Thank you for choosing GENERAL SURGERY  for your care. Our goal is always to provide you with excellent care. Hearing back from our patients is one way we can continue to improve our services. Please take a few minutes to complete the written survey that you may receive in the mail after your visit with us. Thank you!             Your Updated Medication List - Protect others around you: Learn how to safely use, store and throw away your medicines at www.disposemymeds.org.          This list is accurate as of 10/4/18 12:53 PM.  Always use your most recent med list.                   Brand Name Dispense Instructions for use Diagnosis    acetaminophen 325 MG tablet    TYLENOL    30 tablet    Take 2 tablets (650 mg) by mouth every 6 hours as needed for other (multimodal surgical pain management along with NSAIDS and opioid medication as indicated based on pain control and physical function.)    Morbid obesity (H)       * cyanocobalamin 1000 MCG/ML injection    VITAMIN B12    1 mL    Inject 1 mL (1,000 mcg) Subcutaneous once for 1 dose    Status post bariatric surgery       * cyanocobalamin 1000 MCG/ML injection    VITAMIN B12    1 mL    Inject 1 mL (1,000 mcg) Subcutaneous every 30 days    Status post bariatric surgery       D3-1000 1000 units Caps      Take 3,000 Units by mouth every morning        FIBER PO      Take 5 capsules by mouth  "every morning        FLUoxetine 40 MG capsule    PROzac     Take 40 mg by mouth every morning        hydrOXYzine 50 MG tablet    ATARAX     Take  mg by mouth as needed        hyoscyamine 0.125 MG tablet    ANASPAZ/LEVSIN    60 tablet    Take 1-2 tablets (125-250 mcg) by mouth every 4 hours as needed for cramping    Morbid obesity (H)       levothyroxine 75 MCG tablet    SYNTHROID/LEVOTHROID     Take 75 mcg by mouth every morning        omeprazole 20 MG CR capsule    priLOSEC    60 capsule    Take 1 capsule (20 mg) by mouth 2 times daily    Morbid obesity (H)       ondansetron 4 MG ODT tab    ZOFRAN-ODT    30 tablet    Take 1 tablet (4 mg) by mouth every 4 hours as needed for nausea or vomiting    Morbid obesity (H)       oxyCODONE IR 5 MG tablet    ROXICODONE     Take 5 mg by mouth every 8 hours as needed        senna-docusate 8.6-50 MG per tablet    SENOKOT-S;PERICOLACE    30 tablet    Take 1 tablet by mouth 2 times daily    Morbid obesity (H)       Syringe/Needle (Disp) 25G X 5/8\" 3 ML Misc    BD ECLIPSE SYRINGE    1 each    1 Syringe every 30 days    Status post bariatric surgery       topiramate 25 MG tablet    TOPAMAX    120 tablet    Take 2 tablets (50 mg) by mouth 2 times daily    Morbid obesity (H)       ursodiol 300 MG capsule    ACTIGALL    60 capsule    Take 1 capsule (300 mg) by mouth 2 times daily    S/P laparoscopic sleeve gastrectomy       * Notice:  This list has 2 medication(s) that are the same as other medications prescribed for you. Read the directions carefully, and ask your doctor or other care provider to review them with you.      "

## 2018-10-04 NOTE — MR AVS SNAPSHOT
MRN:[id number]                      After Visit Summary   10/4/2018    Brianna Brewer    MRN: [id number]           Visit Information        Provider Department      10/4/2018 12:00 PM Bernie Dalton RD [facility]        Care Instructions    Goals:  1) Once tolerating soft diet, progress to bariatric regular diet.   2) Consume 60 grams of protein/day.  3) Sip on 48-64 oz of fluids/day- between meals only.  4) Eat slowly (>20 min/meal), chewing foods well (to applesauce-like consistency).  5) Limit portions to 1/2 cup/meal.  6) Take the following supplements:    Multivitamin/minerals: adult dose 2 times daily    Iron: 45-60 mg elemental (18-36 mg if low risk) - may partly or fully be covered in multivitamin     Calcium Citrate containing vitamin D: 500 mg 3 times daily or 600 mg 2 times daily    Vitamin B12: sublingual form of at least 500 mcg daily or injection of 1000 mcg monthly     B-50 Complex once daily     Follow up with RD in two months    Bernie Dalton RD, LD  If you need to schedule or reschedule with a dietitian please call [phone].          [employer] Information     [employer] gives you secure access to your electronic health record. If you see a primary care provider, you can also send messages to your care team and make appointments. If you have questions, please call your primary care clinic.  If you do not have a primary care provider, please call [phone] and they will assist you.      [employer] is an electronic gateway that provides easy, online access to your medical records. With [employer], you can request a clinic appointment, read your test results, renew a prescription or communicate with your care team.     To access your existing account, please contact your [facility] or call [phone] for assistance.        Care EveryWhere ID     This is your Care EveryWhere ID. This could be used by other organizations  to access your [place] medical records  [id number]        Equal Access to Services     JANICE HARDIN : Nabila Zuñiga, julian crockett, cydney tinsley. So [facility] [phone].    ATENCIÓN: Si habla español, tiene a parish disposición servicios gratuitos de asistencia lingüística. Llame al [phone].    We comply with applicable federal civil rights laws and [place] laws. We do not discriminate on the basis of race, color, national origin, age, disability, sex, sexual orientation, or gender identity.

## 2018-10-04 NOTE — LETTER
10/4/2018       RE: Brianna Brewer  [address] [place] [id number]     Dear Colleague,    Thank you for referring your patient, Brianna Brewer, to the [facility] SURGICAL WEIGHT MANAGEMENT at [facility]. Please see a copy of my visit note below.    Nutrition Reassessment  Reason For Visit:  Brianna Brewer is a 37 year old female presenting today for nutrition follow-up, 1 month s/p SG with Dr Swanson(8/28/18).  Patient referred by Renetta LINK(9/6/18).    Anthropometrics:  Initial Consult Weight: 287.3 lbs  Day of Surgery Weight: 281.3 lbs  Current Weight: 272.6 lbs    Weight loss: -14.7 lbs from initial consult; -8.7 from day of surgery    Current Vitamins/Minerals: MVI/minerals BID    Nutrition History:  Tea, crystal light  Chicken - one meal per day  Progress with Previous Goals:  1) Follow bariatric low-fat full liquid diet through day 13 post-op, then to progress to pureed diet x 2 weeks(9/11).  If tolerating, may advance on day 29 post-op to bariatric soft diet(9/25). Not tolerated soft foods per pt, not really following dietary recommendations  2) Work towards 60 gm protein/day. Continues, might have some chicken unable to estimate how much chicken she can eat at a meal  3) Consume 48-64 oz fluids daily- between meals. Met, drinking 4 16 oz mugs of water or tea per day  4) Eat slowly (>20 min/meal), chewing well to smooth consistency once on the bariatric soft diet. continues  5) Limit portions to 1/2 cup/meal. Unable to report portion sizes, pt reported only eating once per day  6) Start chewable/liquid multivitamin/minerals twice daily. Did not start yet, discussed importance of vitamin supplements and provided websites to order if unable to get to the store.    Nutrition Prescription:  Grams Protein: 60 (minimum)  Amount of Fluid: 48-64 oz    Nutrition Diagnosis  Previous: Food and nutrition-related knowledge deficit r/t lack of prior  exposure to diet advancements beyond bariatric low-fat full liquid diet aeb pt unable to verbalize understanding of bariatric pureed and soft diets.    Current: Food and nutrition-related knowledge deficit r/t lack of prior exposure to diet advancements beyond bariatric pureed diet aeb pt unable to verbalize full understanding of bariatric soft and regular consistency diets.    Intervention  Materials/Education provided on bariatric soft and regular consistency diets, protein intake, fluid intake, eating pace, chewing foods well, portion control, sugar/fat intake, recommended vitamin/mineral supplements.     Patient Understanding: fair  Expected Compliance: fair    Goals:  1) Once tolerating soft diet, progress to bariatric regular diet.   2) Consume 60 grams of protein/day.  3) Sip on 48-64 oz of fluids/day- between meals only.  4) Eat slowly (>20 min/meal), chewing foods well (to applesauce-like consistency).  5) Limit portions to 1/2 cup/meal.  6) Take the following supplements:    Multivitamin/minerals: adult dose 2 times daily    Iron: 45-60 mg elemental (18-36 mg if low risk) - may partly or fully be covered in multivitamin     Calcium Citrate containing vitamin D: 500 mg 3 times daily or 600 mg 2 times daily    Vitamin B12: sublingual form of at least 500 mcg daily or injection of 1000 mcg monthly     B-50 Complex once daily        Follow-Up: prn    Time spent with patient: 30 minutes.  Bernie Dalton, RD, LD

## 2019-01-08 DIAGNOSIS — E66.01 MORBID OBESITY (H): ICD-10-CM

## 2019-01-08 RX ORDER — TOPIRAMATE 25 MG/1
50 TABLET, FILM COATED ORAL 2 TIMES DAILY
Qty: 120 TABLET | Status: CANCELLED | OUTPATIENT
Start: 2019-01-08

## 2019-01-08 NOTE — TELEPHONE ENCOUNTER
Called and left message for patient in regards to refill request. Wondering if patient is still taking Topiramate after surgery. Advised patient to call back to let us know if refill is needed, as well as to schedule follow up appointment. Number left.

## 2019-01-08 NOTE — TELEPHONE ENCOUNTER
topiramate (TOPAMAX) 25 MG tablet      Last Written Prescription Date:  8/24/18*as historical  Last Fill Quantity: 120,   # refills: 1  Last Office Visit : 9/6/18  Future Office visit:  none    Routing refill request to provider for review/approval because:  Drug not on the Surgery refill protocol.    8/28/18 Laparoscopic Sleeve Gastrectomy- continue this medication?

## 2019-01-17 ENCOUNTER — OFFICE VISIT (OUTPATIENT)
Dept: SURGERY | Facility: CLINIC | Age: 39
End: 2019-01-17
Payer: MEDICAID

## 2019-01-17 VITALS
WEIGHT: 235.1 LBS | DIASTOLIC BLOOD PRESSURE: 83 MMHG | HEIGHT: 62 IN | BODY MASS INDEX: 43.26 KG/M2 | SYSTOLIC BLOOD PRESSURE: 130 MMHG | HEART RATE: 77 BPM | TEMPERATURE: 97.6 F | OXYGEN SATURATION: 98 %

## 2019-01-17 DIAGNOSIS — E66.01 MORBID OBESITY (H): Primary | ICD-10-CM

## 2019-01-17 RX ORDER — TOPIRAMATE 25 MG/1
50 TABLET, FILM COATED ORAL 2 TIMES DAILY
Qty: 360 TABLET | Refills: 3 | Status: SHIPPED | OUTPATIENT
Start: 2019-01-17 | End: 2020-05-19

## 2019-01-17 RX ORDER — AMOXICILLIN 250 MG
1 CAPSULE ORAL DAILY PRN
Qty: 90 TABLET | Refills: 3 | Status: SHIPPED | OUTPATIENT
Start: 2019-01-17

## 2019-01-17 ASSESSMENT — MIFFLIN-ST. JEOR: SCORE: 1711.04

## 2019-01-17 NOTE — PROGRESS NOTES
Return Bariatric Surgery Note    RE: Brianna Brewer  MR#: [id number]  : 1980  VISIT DATE: 2019    Dear Dr. Kramer,    I had the pleasure of seeing your patient, Brianna Brewer, in my post-bariatric surgery assessment clinic.    CHIEF COMPLAINT: Post-bariatric surgery follow-up    Has had some problems with food intolerance; however, doesn't have problems with first few bites of food.  Typically has problems later in meals with nausea.    Took topamax prior to surgery along with phentermine and these were never started again.    Has had constipation as well.    Some pain with eating.    HISTORY OF PRESENT ILLNESS:  Questions Regarding Prior Weight Loss Surgery Reviewed With Patient 2019   I had the following weight loss procedure: Sleeve Gastrectomy   What year was your surgery? 2018   How has your weight changed since your last visit? I have lost weight   Are you currently taking any weight loss medications? Yes   Do you currently have any of the following: Heartburn, acid reflux, or GERD (acid reflux disease)?   Have you been to the Emergency room since your last visit with us? No   Were you in the hospital since your last visit with us? No   Do you have any concerns today? eating in general       Weight History:     2019   What is your highest lifetime weight? 335   What is your lowest weight since surgery? (In pounds) 232     Initial Weight: 130.3 kg (287 lb 4.8 oz)  Current Weight: Weight: 106.6 kg (235 lb 1.6 oz)  Cumulative weight loss (lbs): 52.2  Last Visits Weight: 128.3 kg (282 lb 14.4 oz)    Questions Regarding Co-Morbidities and Health Concerns Reviewed With Patient 2019   Pre-diabetes: Never   Diabetes II: Never   High Blood Pressure: Never   High cholesterol: Never   Heartburn/Reflux: Worsened   Are you taking daily medication for heartburn, acid reflux, or GERD (acid reflux disease)? -   Sleep apnea: Never   PCOS: Never   Back pain: Never   Joint pain:  "Never   Lower leg swelling: Never       Eating Habits 1/17/2019   How many meals do you eat per day? 2   Do you snack between meals? Yes   How much food are you eating at each meal? Less than 1/2 cup   Are you able to separate your meals and liquids by at least 30 minutes? No   Are you able to avoid liquid calories? No       Exercise Questions Reviewed With Patient 1/17/2019   How often do you exercise? Never   What keeps you from being more active?  Pain, My ability to walk or move around is limited       Social History:      1/17/2019   Are you smoking? No   Are you drinking alcohol? No       Medications:  Current Outpatient Medications   Medication     acetaminophen (TYLENOL) 325 MG tablet     Cholecalciferol (D3-1000) 1000 UNITS CAPS     cyanocobalamin (VITAMIN B12) 1000 MCG/ML injection     FIBER PO     FLUoxetine (PROZAC) 40 MG capsule     hydrOXYzine (ATARAX) 50 MG tablet     levothyroxine (SYNTHROID/LEVOTHROID) 75 MCG tablet     omeprazole (PRILOSEC) 20 MG CR capsule     ondansetron (ZOFRAN-ODT) 4 MG ODT tab     oxyCODONE IR (ROXICODONE) 5 MG tablet     senna-docusate (SENOKOT-S;PERICOLACE) 8.6-50 MG per tablet     Syringe/Needle, Disp, (BD ECLIPSE SYRINGE) 25G X 5/8\" 3 ML MISC     topiramate (TOPAMAX) 25 MG tablet     ursodiol (ACTIGALL) 300 MG capsule     No current facility-administered medications for this visit.          1/17/2019   Do you avoid NSAIDs such as (Ibuprofen, Aleve, Naproxen, Advil)?   Yes       ROS:  GI:      1/17/2019   Vomiting: Yes   Diarrhea: Yes   Constipation: Yes   Swallowing trouble: Yes   Abdominal pain: Yes   Heartburn: Yes   Rash in skin folds: Yes   Depression: Yes   Stress urinary incontinence Yes     Skin:   BRENDA JACKSON ROS - SKIN 1/17/2019   Rash in skin folds: Yes     Psych:      1/17/2019   Depression: Yes   Anxiety: Yes     Female Only:   BAR RBS ROS - FEMALE ONLY 1/17/2019   Female only: Loss of menstrual cycles, Irregular menstrual cycles       LABS/IMAGING/MEDICAL RECORDS " "REVIEW: not ordered.    PHYSICAL EXAMINATION:  /83   Pulse 77   Temp 97.6  F (36.4  C) (Oral)   Ht 1.585 m (5' 2.4\")   Wt 106.6 kg (235 lb 1.6 oz)   SpO2 98%   BMI 42.45 kg/m     RR  Breathing unlabored.    Interested in abdominal wall hernia repaiir ASAP.  Reports that this is limiting her movement.    ASSESSMENT AND PLAN:      1. 4 1/2 months status laparoscopic gastric sleeve  2. Morbid Obesity current BMI: Body mass index is 42.45 kg/m .  3. Post surgical malabsorption:   Labs ordered per protocol.   Follow food plan per dietitian recommendations.   Continue taking recommended post-op vitamins.  4. Return to clinic in 2-3 months..    Sincerely,    Zoran Swanson MD    I spent a total of 25 minutes face to face with Brianna during today's office visit. Over 50% of this time was spent counseling the patient and/or coordinating care.  "

## 2019-01-17 NOTE — PATIENT INSTRUCTIONS
Goals:  1) Continue bariatric regular diet.   2) Consume 60 grams of protein/day.  3) Sip on 48-64 oz of fluids/day- between meals only.  4) Eat slowly (>20 min/meal), chewing foods well (to applesauce-like consistency).  5) Limit portions to 1/2 cup/meal.   -preplan meals the night before so they are easy to grab while caring for children  6) Take the following supplements:    Multivitamin/minerals: adult dose 2 times daily    Iron: 45-60 mg elemental (18-36 mg if low risk) - may partly or fully be covered in multivitamin     Calcium Citrate containing vitamin D: 500 mg 3 times daily or 600 mg 2 times daily    Vitamin B12: sublingual form of at least 500 mcg daily or injection of 1000 mcg monthly     B-50 Complex once daily       Bernie Dalton, SREEKANTH, LD  If you need to schedule or reschedule with a dietitian please call [phone].

## 2019-01-17 NOTE — LETTER
1/17/2019       RE: Brianna Brewer  [address] [place] [id number]     Dear Colleague,    Thank you for referring your patient, Brianna Brewer, to the [facility] SURGICAL WEIGHT MANAGEMENT at [facility]. Please see a copy of my visit note below.    Nutrition Reassessment  Reason For Visit:  Brianna Brewer is a 38 year old female presenting today for nutrition follow-up, 5 months s/p SG with Dr Swanson(8/28/18).  Patient referred by Renetta LINK(9/6/18).    Anthropometrics:  Initial Consult Weight: 287.3 lbs  Day of Surgery Weight: 281.3 lbs  Current Weight: 235.1 lbs    Weight loss: -52.2 lbs from initial consult; -46.2 lbs from day of surgery    Current Vitamins/Minerals: MVI/minerals BID    Nutrition History:  Recent food recall:  smartone's meal once per day 1/3 -1/2 of the meal  Crackers  Cheese  Deli turkey  pepperoni   Water 2-3 bottles per day  Drinking regular pop again 8 oz cans 1-2 per day    Progress with Previous Goals:  1) Once tolerating soft diet, progress to bariatric regular diet. -met  2) Consume 60 grams of protein/day. -continues  3) Sip on 48-64 oz of fluids/day- between meals only. - struggles to separate all the time  4) Eat slowly (>20 min/meal), chewing foods well (to applesauce-like consistency). continues  5) Limit portions to 1/2 cup/meal. met  6) Take the following supplements: multi, vit d, and b12    Multivitamin/minerals: adult dose 2 times daily    Iron: 45-60 mg elemental (18-36 mg if low risk) - may partly or fully be covered in multivitamin     Calcium Citrate containing vitamin D: 500 mg 3 times daily or 600 mg 2 times daily    Vitamin B12: sublingual form of at least 500 mcg daily or injection of 1000 mcg monthly     B-50 Complex once daily     Nutrition Prescription:  Grams Protein: 60 (minimum)  Amount of Fluid: 48-64 oz    Nutrition Diagnosis  Previous: Food and nutrition-related knowledge deficit r/t lack of  prior exposure to diet advancements beyond bariatric pureed diet aeb pt unable to verbalize full understanding of bariatric soft and regular consistency diets.    Current: Food and nutrition-related knowledge deficit r/t lack of prior exposure to diet instruction beyond 6 months s/p SG as evidenced by Pt seeking further guidance from RD on diet instruction beyond 6 months s/p SG.     Intervention  Materials/Education provided on bariatric soft and regular consistency diets, protein intake, fluid intake, eating pace, chewing foods well, portion control, sugar/fat intake, recommended vitamin/mineral supplements. Encouraged including more protein throughout the day.  She tends to snack/graze during the day while her children are snacking, encouraged making or planning ahead a quick meal she can grab that will have protein.  Encouraged starting calcium and B complex as patient noted more hair falling out.    Patient Understanding: fair  Expected Compliance: fair    Goals:  1) Continue bariatric regular diet.   2) Consume 60 grams of protein/day.  3) Sip on 48-64 oz of fluids/day- between meals only.  4) Eat slowly (>20 min/meal), chewing foods well (to applesauce-like consistency).  5) Limit portions to 1/2 cup/meal.   -preplan meals the night before so they are easy to grab while caring for children  6) Take the following supplements:    Multivitamin/minerals: adult dose 2 times daily    Iron: 45-60 mg elemental (18-36 mg if low risk) - may partly or fully be covered in multivitamin     Calcium Citrate containing vitamin D: 500 mg 3 times daily or 600 mg 2 times daily    Vitamin B12: sublingual form of at least 500 mcg daily or injection of 1000 mcg monthly     B-50 Complex once daily        Follow-Up: prn    Time spent with patient: 30 minutes.    Bernie Dalton, RD, LD

## 2019-01-17 NOTE — PATIENT INSTRUCTIONS
Smaller meals; stop eating after a few bites of food.    Topamax prescribed.    Senna-pericolace prescribed.    F/u in 1-2 month with NIKOLAY Hameed to evaluate utility of antiobesity medications.  Dietician visit every visit.    Regular food only (chewable food).    Only eat what you can  with a fork.    Avoid all liquid foods (anything pourable that has calories in it) from now on.  Avoid all crumbly foods (crackers, cookies, chips, toast) and all starch-based foods (potatoes, bread, pasta, rice).    Start with protein food in each meal (steak, chicken, fish, beans, eggs); then leafy vegetables (dry leafy vegetables such as spinach, kale, tomas, dandelions, mixed greens, etc.); then solid fruits.    No drinking while eating.  Try holding off on drinking until 60 or more minutes after eating.

## 2019-01-17 NOTE — NURSING NOTE
"(   Chief Complaint   Patient presents with     RECHECK     3 month follow up    )    ( Weight: 106.6 kg (235 lb 1.6 oz) )  ( Height: 158.5 cm (5' 2.4\") )  ( BMI (Calculated): 42.54 )  ( Initial Weight: 130.3 kg (287 lb 4.8 oz) )  ( Cumulative weight loss (lbs): 52.2 )  ( Last Visits Weight: 128.3 kg (282 lb 14.4 oz) )  ( Wt change since last visit (lbs): -47.8 )  (   )  (   )    ( BP: 130/83 )  (   )  ( Temp: 97.6  F (36.4  C) )  ( Temp src: Oral )  ( Pulse: 77 )  (   )  ( SpO2: 98 % )    (   Patient Active Problem List   Diagnosis     Ventral hernia without obstruction or gangrene     Morbid obesity (H)     Depression     Morbid (severe) obesity due to excess calories (H)     Dehydration     S/P laparoscopic sleeve gastrectomy    )  (   Current Outpatient Medications   Medication Sig Dispense Refill     acetaminophen (TYLENOL) 325 MG tablet Take 2 tablets (650 mg) by mouth every 6 hours as needed for other (multimodal surgical pain management along with NSAIDS and opioid medication as indicated based on pain control and physical function.) 30 tablet 0     Cholecalciferol (D3-1000) 1000 UNITS CAPS Take 3,000 Units by mouth every morning        cyanocobalamin (VITAMIN B12) 1000 MCG/ML injection Inject 1 mL (1,000 mcg) Subcutaneous every 30 days 1 mL 11     FIBER PO Take 5 capsules by mouth every morning       FLUoxetine (PROZAC) 40 MG capsule Take 40 mg by mouth every morning        hydrOXYzine (ATARAX) 50 MG tablet Take  mg by mouth as needed        levothyroxine (SYNTHROID/LEVOTHROID) 75 MCG tablet Take 75 mcg by mouth every morning        omeprazole (PRILOSEC) 20 MG CR capsule Take 1 capsule (20 mg) by mouth 2 times daily 60 capsule 0     ondansetron (ZOFRAN-ODT) 4 MG ODT tab Take 1 tablet (4 mg) by mouth every 4 hours as needed for nausea or vomiting 30 tablet 0     oxyCODONE IR (ROXICODONE) 5 MG tablet Take 5 mg by mouth every 8 hours as needed   0     senna-docusate (SENOKOT-S;PERICOLACE) 8.6-50 MG per " "tablet Take 1 tablet by mouth 2 times daily 30 tablet 0     Syringe/Needle, Disp, (BD ECLIPSE SYRINGE) 25G X 5/8\" 3 ML MISC 1 Syringe every 30 days 1 each 11     topiramate (TOPAMAX) 25 MG tablet Take 2 tablets (50 mg) by mouth 2 times daily 120 tablet 1     ursodiol (ACTIGALL) 300 MG capsule Take 1 capsule (300 mg) by mouth 2 times daily 60 capsule 5    )  ( Diabetes Eval:    )    ( Pain Eval:  Data Unavailable )    ( Wound Eval:       )    (   History   Smoking Status     Former Smoker     Packs/day: 1.00     Years: 20.00     Types: Cigarettes     Start date: 1/1/1998     Quit date: 5/8/2016   Smokeless Tobacco     Never Used    )    ( Signed By:  Bee Manzano; January 17, 2019; 11:55 AM )    "

## 2019-01-17 NOTE — LETTER
2019       RE: Brianna Brewer  [address] [facility] [address]     Dear Colleague,    Thank you for referring your patient, Brianna Brewer, to the [facility] SURGICAL WEIGHT MANAGEMENT at [facility]. Please see a copy of my visit note below.  Return Bariatric Surgery Note    RE: Brianna Brewer  MR#: [id number]  : 1980  VISIT DATE: 2019    Dear Dr. Kramer,    I had the pleasure of seeing your patient, Brianna Brewer, in my post-bariatric surgery assessment clinic.    CHIEF COMPLAINT: Post-bariatric surgery follow-up    Has had some problems with food intolerance; however, doesn't have problems with first few bites of food.  Typically has problems later in meals with nausea.    Took topamax prior to surgery along with phentermine and these were never started again.    Has had constipation as well.    Some pain with eating.    HISTORY OF PRESENT ILLNESS:  Questions Regarding Prior Weight Loss Surgery Reviewed With Patient 2019   I had the following weight loss procedure: Sleeve Gastrectomy   What year was your surgery? 2018   How has your weight changed since your last visit? I have lost weight   Are you currently taking any weight loss medications? Yes   Do you currently have any of the following: Heartburn, acid reflux, or GERD (acid reflux disease)?   Have you been to the Emergency room since your last visit with us? No   Were you in the hospital since your last visit with us? No   Do you have any concerns today? eating in general       Weight History:     2019   What is your highest lifetime weight? 335   What is your lowest weight since surgery? (In pounds) 232     Initial Weight: 130.3 kg (287 lb 4.8 oz)  Current Weight: Weight: 106.6 kg (235 lb 1.6 oz)  Cumulative weight loss (lbs): 52.2  Last Visits Weight: 128.3 kg (282 lb 14.4 oz)    Questions Regarding Co-Morbidities and Health Concerns Reviewed With Patient  "1/17/2019   Pre-diabetes: Never   Diabetes II: Never   High Blood Pressure: Never   High cholesterol: Never   Heartburn/Reflux: Worsened   Are you taking daily medication for heartburn, acid reflux, or GERD (acid reflux disease)? -   Sleep apnea: Never   PCOS: Never   Back pain: Never   Joint pain: Never   Lower leg swelling: Never       Eating Habits 1/17/2019   How many meals do you eat per day? 2   Do you snack between meals? Yes   How much food are you eating at each meal? Less than 1/2 cup   Are you able to separate your meals and liquids by at least 30 minutes? No   Are you able to avoid liquid calories? No       Exercise Questions Reviewed With Patient 1/17/2019   How often do you exercise? Never   What keeps you from being more active?  Pain, My ability to walk or move around is limited       Social History:      1/17/2019   Are you smoking? No   Are you drinking alcohol? No       Medications:  Current Outpatient Medications   Medication     acetaminophen (TYLENOL) 325 MG tablet     Cholecalciferol (D3-1000) 1000 UNITS CAPS     cyanocobalamin (VITAMIN B12) 1000 MCG/ML injection     FIBER PO     FLUoxetine (PROZAC) 40 MG capsule     hydrOXYzine (ATARAX) 50 MG tablet     levothyroxine (SYNTHROID/LEVOTHROID) 75 MCG tablet     omeprazole (PRILOSEC) 20 MG CR capsule     ondansetron (ZOFRAN-ODT) 4 MG ODT tab     oxyCODONE IR (ROXICODONE) 5 MG tablet     senna-docusate (SENOKOT-S;PERICOLACE) 8.6-50 MG per tablet     Syringe/Needle, Disp, (BD ECLIPSE SYRINGE) 25G X 5/8\" 3 ML MISC     topiramate (TOPAMAX) 25 MG tablet     ursodiol (ACTIGALL) 300 MG capsule     No current facility-administered medications for this visit.          1/17/2019   Do you avoid NSAIDs such as (Ibuprofen, Aleve, Naproxen, Advil)?   Yes       ROS:  GI:      1/17/2019   Vomiting: Yes   Diarrhea: Yes   Constipation: Yes   Swallowing trouble: Yes   Abdominal pain: Yes   Heartburn: Yes   Rash in skin folds: Yes   Depression: Yes   Stress urinary " "incontinence Yes     Skin:   BRENDA JACKSON ROS - SKIN 1/17/2019   Rash in skin folds: Yes     Psych:      1/17/2019   Depression: Yes   Anxiety: Yes     Female Only:   BAR RBS ROS - FEMALE ONLY 1/17/2019   Female only: Loss of menstrual cycles, Irregular menstrual cycles       LABS/IMAGING/MEDICAL RECORDS REVIEW: not ordered.    PHYSICAL EXAMINATION:  /83   Pulse 77   Temp 97.6  F (36.4  C) (Oral)   Ht 1.585 m (5' 2.4\")   Wt 106.6 kg (235 lb 1.6 oz)   SpO2 98%   BMI 42.45 kg/m      RR  Breathing unlabored.    Interested in abdominal wall hernia repaiir ASAP.  Reports that this is limiting her movement.    ASSESSMENT AND PLAN:      1. 4 1/2 months status laparoscopic gastric sleeve  2. Morbid Obesity current BMI: Body mass index is 42.45 kg/m .  3. Post surgical malabsorption:   Labs ordered per protocol.   Follow food plan per dietitian recommendations.   Continue taking recommended post-op vitamins.  4. Return to clinic in 2-3 months..    Sincerely,    Zoran Swanson MD    I spent a total of 25 minutes face to face with Brianna during today's office visit. Over 50% of this time was spent counseling the patient and/or coordinating care.    "

## 2019-01-17 NOTE — PROGRESS NOTES
Nutrition Reassessment  Reason For Visit:  Brianna Brewer is a 38 year old female presenting today for nutrition follow-up, 5 months s/p SG with Dr Swanson(8/28/18).  Patient referred by Renetta LINK(9/6/18).    Anthropometrics:  Initial Consult Weight: 287.3 lbs  Day of Surgery Weight: 281.3 lbs  Current Weight: 235.1 lbs    Weight loss: -52.2 lbs from initial consult; -46.2 lbs from day of surgery    Current Vitamins/Minerals: MVI/minerals BID    Nutrition History:  Recent food recall:  smartone's meal once per day 1/3 -1/2 of the meal  Crackers  Cheese  Deli turkey  pepperoni   Water 2-3 bottles per day  Drinking regular pop again 8 oz cans 1-2 per day    Progress with Previous Goals:  1) Once tolerating soft diet, progress to bariatric regular diet. -met  2) Consume 60 grams of protein/day. -continues  3) Sip on 48-64 oz of fluids/day- between meals only. - struggles to separate all the time  4) Eat slowly (>20 min/meal), chewing foods well (to applesauce-like consistency). continues  5) Limit portions to 1/2 cup/meal. met  6) Take the following supplements: multi, vit d, and b12    Multivitamin/minerals: adult dose 2 times daily    Iron: 45-60 mg elemental (18-36 mg if low risk) - may partly or fully be covered in multivitamin     Calcium Citrate containing vitamin D: 500 mg 3 times daily or 600 mg 2 times daily    Vitamin B12: sublingual form of at least 500 mcg daily or injection of 1000 mcg monthly     B-50 Complex once daily     Nutrition Prescription:  Grams Protein: 60 (minimum)  Amount of Fluid: 48-64 oz    Nutrition Diagnosis  Previous: Food and nutrition-related knowledge deficit r/t lack of prior exposure to diet advancements beyond bariatric pureed diet aeb pt unable to verbalize full understanding of bariatric soft and regular consistency diets.    Current: Food and nutrition-related knowledge deficit r/t lack of prior exposure to diet instruction beyond 6 months s/p SG as evidenced by Pt  seeking further guidance from RD on diet instruction beyond 6 months s/p SG.     Intervention  Materials/Education provided on bariatric soft and regular consistency diets, protein intake, fluid intake, eating pace, chewing foods well, portion control, sugar/fat intake, recommended vitamin/mineral supplements. Encouraged including more protein throughout the day.  She tends to snack/graze during the day while her children are snacking, encouraged making or planning ahead a quick meal she can grab that will have protein.  Encouraged starting calcium and B complex as patient noted more hair falling out.    Patient Understanding: fair  Expected Compliance: fair    Goals:  1) Continue bariatric regular diet.   2) Consume 60 grams of protein/day.  3) Sip on 48-64 oz of fluids/day- between meals only.  4) Eat slowly (>20 min/meal), chewing foods well (to applesauce-like consistency).  5) Limit portions to 1/2 cup/meal.   -preplan meals the night before so they are easy to grab while caring for children  6) Take the following supplements:    Multivitamin/minerals: adult dose 2 times daily    Iron: 45-60 mg elemental (18-36 mg if low risk) - may partly or fully be covered in multivitamin     Calcium Citrate containing vitamin D: 500 mg 3 times daily or 600 mg 2 times daily    Vitamin B12: sublingual form of at least 500 mcg daily or injection of 1000 mcg monthly     B-50 Complex once daily        Follow-Up: prn    Time spent with patient: 30 minutes.  Bernie Dalton, SREEKANTH, LD

## 2019-01-30 ENCOUNTER — TELEPHONE (OUTPATIENT)
Dept: ENDOCRINOLOGY | Facility: CLINIC | Age: 39
End: 2019-01-30

## 2019-01-30 NOTE — TELEPHONE ENCOUNTER
Left message for patient to talk about follow up with Dr. Finch. Dr. Finch would like to see her in clinic at her convenience to discuss hernia repair. Unsure if she is still on narcotic pain medication but he wants to be sure she knows she would need to be off of narcotics for pain for 4 months prior to surgery.  MAGDALENO Norman CNP

## 2019-03-17 DIAGNOSIS — Z98.84 S/P LAPAROSCOPIC SLEEVE GASTRECTOMY: ICD-10-CM

## 2019-03-19 RX ORDER — URSODIOL 300 MG/1
300 CAPSULE ORAL 2 TIMES DAILY
Qty: 60 CAPSULE | Refills: 5 | OUTPATIENT
Start: 2019-03-19

## 2019-03-19 NOTE — TELEPHONE ENCOUNTER
Recieved refill request for ursodiol (ACTIGALL) 300 MG .  Patient needs appointment scheduled prior to any refills. Clinic Coordinator notified and will follow up with the patient as appropriate. The pharmacy has been notified that the medication will not be refilled prior to an appointment being scheduled.

## 2019-04-10 ENCOUNTER — OFFICE VISIT (OUTPATIENT)
Dept: SURGERY | Facility: CLINIC | Age: 39
End: 2019-04-10
Payer: MEDICAID

## 2019-04-10 ENCOUNTER — TELEPHONE (OUTPATIENT)
Dept: GASTROENTEROLOGY | Facility: CLINIC | Age: 39
End: 2019-04-10

## 2019-04-10 VITALS
WEIGHT: 219.1 LBS | SYSTOLIC BLOOD PRESSURE: 115 MMHG | DIASTOLIC BLOOD PRESSURE: 75 MMHG | HEART RATE: 76 BPM | TEMPERATURE: 97.7 F | BODY MASS INDEX: 40.32 KG/M2 | HEIGHT: 62 IN | OXYGEN SATURATION: 98 %

## 2019-04-10 DIAGNOSIS — Z98.84 BARIATRIC SURGERY STATUS: Primary | ICD-10-CM

## 2019-04-10 DIAGNOSIS — K21.00 GASTROESOPHAGEAL REFLUX DISEASE WITH ESOPHAGITIS: ICD-10-CM

## 2019-04-10 ASSESSMENT — PAIN SCALES - GENERAL: PAINLEVEL: SEVERE PAIN (6)

## 2019-04-10 ASSESSMENT — MIFFLIN-ST. JEOR: SCORE: 1638.46

## 2019-04-10 NOTE — NURSING NOTE
"Chief Complaint   Patient presents with     RECHECK     hernia       Vitals:    04/10/19 1055   BP: 115/75   BP Location: Left arm   Patient Position: Chair   Cuff Size: Adult Regular   Pulse: 76   Temp: 97.7  F (36.5  C)   TempSrc: Oral   SpO2: 98%   Weight: 99.4 kg (219 lb 1.6 oz)   Height: 1.585 m (5' 2.4\")       Body mass index is 39.56 kg/m .    Elisabeth Escobedo CMA    "

## 2019-04-10 NOTE — LETTER
4/10/2019       RE: Brianna Brewer  [address] [place] [id number]     Dear Colleague,    Thank you for referring your patient, Brianna Brewer, to the [facility] SURGICAL WEIGHT MANAGEMENT at [facility]. Please see a copy of my visit note below.    Nutrition Reassessment  Reason For Visit:  Brianna Brewer is a 38 year old female presenting today for nutrition follow-up, 7 months s/p SG with Dr Swanson(8/28/18).  Patient referred by Renetta LINK(9/6/18) and Dr Finch(4/10/19).    Anthropometrics:  Initial Consult Weight: 287.3 lbs  Day of Surgery Weight: 281.3 lbs  Current Weight: 219.1 lbs    Weight loss: -68.2 lbs from initial consult; -62.2 lbs from day of surgery    Current Vitamins/Minerals: B12 injection    Nutrition History:  Recent food recall:  Breakfast: skip  Lunch: random if something appeals - difficulty swallowing with heart burn  Dinner: hamburger zander(1/4 with bun)  Snacks: cheese sticks, italian ice  Beverages: water, tea(sweetened- brisk) occ juice    Yesterday- Slice of pizza, might eat some of what toddler is eating -     Over the last 1-2 months more pain with eating feels like razor blades     Progress with previous goals:  1) Continue bariatric regular diet. continues   2) Consume 60 grams of protein/day. Not met   3) Sip on 48-64 oz of fluids/day- between meals only. Not met - drinking 16 oz water per    4) Eat slowly (>20 min/meal), chewing foods well (to applesauce-like consistency). continues   5) Limit portions to 1/2 cup/meal. No specific meal times will snack/graze throughout the day as something looks appealing   -preplan meals the night before so they are easy to grab while caring for children  6) Take the following supplements: B12, not taking due to swallowing issues/pain     Multivitamin/minerals: adult dose 2 times daily    Iron: 45-60 mg elemental (18-36 mg if low risk) - may partly or fully be covered in  multivitamin     Calcium Citrate containing vitamin D: 500 mg 3 times daily or 600 mg 2 times daily    Vitamin B12: sublingual form of at least 500 mcg daily or injection of 1000 mcg monthly     B-50 Complex once daily     Nutrition Prescription:  Grams Protein: 60 (minimum)  Amount of Fluid: 48-64 oz    Nutrition Diagnosis  Previous: Food and nutrition-related knowledge deficit r/t lack of prior exposure to diet instruction beyond 6 months s/p SG as evidenced by Pt seeking further guidance from RD on diet instruction beyond 6 months s/p SG. -resolved    Current: Obesity r/t long history of self-monitoring deficit and excessive energy intake aeb BMI >30.     Intervention  Materials/Education provided, reviewed bariatric regular diet, protein intake, fluid intake and recommended vitamin/mineral supplements. Again encouraged including more protein throughout the day, discussed protein supplements and meal replacement, patient is not interested in using these.  She continues to snack/graze during the day while her children are snacking.  Encouraged Restarting all vitamins as able to tolerate with swallowing difficulties.    Patient Understanding: fair  Expected Compliance: fair    Goals:  1) Continue bariatric regular diet.   2) Consume 60 grams of protein/day.  3) Sip on 48-64 oz of fluids/day- between meals only.  4) Eat slowly (>20 min/meal), chewing foods well (to applesauce-like consistency).  5) Limit portions to 1/2 cup/meal.   -preplan meals the night before so they are easy to grab while caring for children  6) Take the following supplements:    Multivitamin/minerals: adult dose 2 times daily    Iron: 45-60 mg elemental (18-36 mg if low risk) - may partly or fully be covered in multivitamin     Calcium Citrate containing vitamin D: 500 mg 3 times daily or 600 mg 2 times daily    Vitamin B12: sublingual form of at least 500 mcg daily or injection of 1000 mcg monthly     B-50 Complex once daily        Follow-Up:  prn    Time spent with patient: 15 minutes.  Again, thank you for allowing me to participate in the care of your patient.      Sincerely,    Bernie Dalton RD

## 2019-04-10 NOTE — PROGRESS NOTES
Very pleasant 38-year-old female who is status post laparoscopic sleeve gastrectomy she is lost almost 100 pounds to date.  Issues related to the burning in her esophagus and discomfort with feeling that she has razor blades in her esophagus when she takes solid food.  She has been seen by the dietitians today and been evaluated by me.  On examination her abdomen is soft and nontender and still quite obese importantly she was originally evaluated for hernia repair.  She had a recent CAT scan that I do not have access to to evaluate kidney stones.  My plan at this point is to have her continue to lose weight but have an upper GI study and an upper endoscopy by Dr. Swanson.  We will obtain her CAT scan and I will see her in 3 months she has no obstructive symptoms at this time.

## 2019-04-10 NOTE — LETTER
4/10/2019       RE: Brianna Brewer  [address] [place] [id number]     Dear Colleague,    Thank you for referring your patient, Brianna Brewer, to the [facility] GENERAL SURGERY at [facility]. Please see a copy of my visit note below.    Very pleasant 38-year-old female who is status post laparoscopic sleeve gastrectomy she is lost almost 100 pounds to date.  Issues related to the burning in her esophagus and discomfort with feeling that she has razor blades in her esophagus when she takes solid food.  She has been seen by the dietitians today and been evaluated by me.  On examination her abdomen is soft and nontender and still quite obese importantly she was originally evaluated for hernia repair.  She had a recent CAT scan that I do not have access to to evaluate kidney stones.  My plan at this point is to have her continue to lose weight but have an upper GI study and an upper endoscopy by Dr. Swanson.  We will obtain her CAT scan and I will see her in 3 months she has no obstructive symptoms at this time.    Again, thank you for allowing me to participate in the care of your patient.      Sincerely,    Gwyn Finch MD

## 2019-04-10 NOTE — PATIENT INSTRUCTIONS
You saw Dr Finch today.     Instructions per today's visit:   -Stop Actigall- only indicated for the first 6 months after sleeve gastrectomy  -Schedule endoscopy and Upper GI study    Please call during clinic hours Monday through Friday 8:00a - 4:00p if you have questions or you can contact us via medineering at anytime.      General Surgery Call Center: [phone]  Fax: [phone]  Surgery Scheduler: [phone]    Please call the hospital at [phone] to speak with our on call MDs if you have urgent needs after hours, during weekends, or holidays.  It was a pleasure meeting with you today.     Thank you for allowing us the privilege of caring for you. We hope we provided you with the excellent service you deserve.     Please let us know if there is anything else we can do for you so that we can be sure you are leaving completely satisfied with your care experience.

## 2019-04-11 ENCOUNTER — TELEPHONE (OUTPATIENT)
Dept: GASTROENTEROLOGY | Facility: CLINIC | Age: 39
End: 2019-04-11

## 2019-04-11 ENCOUNTER — TELEPHONE (OUTPATIENT)
Dept: ENDOCRINOLOGY | Facility: CLINIC | Age: 39
End: 2019-04-11

## 2019-04-11 DIAGNOSIS — Z98.84 STATUS POST BARIATRIC SURGERY: ICD-10-CM

## 2019-04-11 DIAGNOSIS — K21.00 GASTROESOPHAGEAL REFLUX DISEASE WITH ESOPHAGITIS: Primary | ICD-10-CM

## 2019-04-11 RX ORDER — SUCRALFATE 1 G/1
1 TABLET ORAL 4 TIMES DAILY
Qty: 120 TABLET | Refills: 3 | Status: SHIPPED | OUTPATIENT
Start: 2019-04-11 | End: 2020-05-19

## 2019-04-11 NOTE — TELEPHONE ENCOUNTER
Spoke to patient. Discussed heart burn and inability to swallow medications. Has not been taking prescribed omeprazole twice daily as she was unable to swallow them. She describes a razor blade sensation when she swallows. Asked patient to open omeprazole and mix with a little bit of apple sauce and take twice daily. Will also give her carafate as needed for pain. Encouraged good nutrition and hydration. She made follow up with Dr. Finch for 3 months from now but has not scheduled post bariatric surgery follow up for July. explained we need to focus on improving the GERD before we can discuss fixing the hernia.  She verbalized understanding. Encouraged her to see me and Bernie in July and work with me on GERD until July. At that time, we will see how she is doing and discuss hernia again. For now, we will have her see me rather than Dr. Finch for post bariatric follow up and once improving, can see Dr. Finch again for hernia consult follow up. She has agreed to reach out in 3 weeks via Row[employer] to see how GERD is. MAGDALENO Norman CNP

## 2019-04-12 ENCOUNTER — TELEPHONE (OUTPATIENT)
Dept: GASTROENTEROLOGY | Facility: CLINIC | Age: 39
End: 2019-04-12

## 2019-04-28 DIAGNOSIS — Z98.84 S/P LAPAROSCOPIC SLEEVE GASTRECTOMY: ICD-10-CM

## 2019-04-29 RX ORDER — URSODIOL 300 MG/1
300 CAPSULE ORAL 2 TIMES DAILY
Qty: 60 CAPSULE | Refills: 5 | OUTPATIENT
Start: 2019-04-29

## 2019-04-30 ENCOUNTER — TELEPHONE (OUTPATIENT)
Dept: GASTROENTEROLOGY | Facility: CLINIC | Age: 39
End: 2019-04-30

## 2019-04-30 NOTE — TELEPHONE ENCOUNTER
Order Questions     Question Answer Comment   Procedure Upper GI Endoscopy Dr. Swanson   Purpose of Procedure Diagnostic    Does the patient have the following? Chronic narcotic use    Is the patient on the following medications? No blood thinners    Referring provider Lauren Tyler       Associated Diagnoses     Bariatric surgery status [Z98.84]  - Primary       Gastroesophageal reflux disease with esophagitis [K21.0]         VM with request pt contact Endoscopy Pre-assessment RN to review upcoming procedure [facility] Endoscopy information.      Telephone call-back number provided. Awilda Peña RN    Additional Information regarding appointment:   Date/Arrival time: Wednesday May 1st @12:30 pm    Prep Type:   []Golytely eRx: (not sent)  [x]NPO /p MN, No solid food /p 2200 the night before  Facility location:    [x][address], 1st Floor, [address]  [][address] [place] [address], 5th floor

## 2019-05-01 ENCOUNTER — HOSPITAL ENCOUNTER (OUTPATIENT)
Facility: CLINIC | Age: 39
Discharge: HOME | End: 2019-05-01
Attending: SURGERY | Admitting: SURGERY
Payer: MEDICAID

## 2019-05-01 VITALS
RESPIRATION RATE: 15 BRPM | OXYGEN SATURATION: 96 % | SYSTOLIC BLOOD PRESSURE: 105 MMHG | DIASTOLIC BLOOD PRESSURE: 74 MMHG | HEART RATE: 61 BPM

## 2019-05-01 PROCEDURE — 40000104 ZZH STATISTIC MODERATE SEDATION < 10 MIN: Performed by: SURGERY

## 2019-05-01 PROCEDURE — 25000125 ZZHC RX 250: Performed by: SURGERY

## 2019-05-01 PROCEDURE — 43235 EGD DIAGNOSTIC BRUSH WASH: CPT | Performed by: SURGERY

## 2019-05-01 PROCEDURE — 25000128 H RX IP 250 OP 636: Performed by: SURGERY

## 2019-05-01 PROCEDURE — G0500 MOD SEDAT ENDO SERVICE >5YRS: HCPCS | Performed by: SURGERY

## 2019-05-01 PROCEDURE — 25000132 ZZH RX MED GY IP 250 OP 250 PS 637: Performed by: SURGERY

## 2019-05-01 RX ORDER — ONDANSETRON 2 MG/ML
4 INJECTION INTRAMUSCULAR; INTRAVENOUS
Status: DISCONTINUED | OUTPATIENT
Start: 2019-05-01 | End: 2019-05-01 | Stop reason: HOSPADM

## 2019-05-01 RX ORDER — NALOXONE HYDROCHLORIDE 0.4 MG/ML
.1-.4 INJECTION, SOLUTION INTRAMUSCULAR; INTRAVENOUS; SUBCUTANEOUS
Status: DISCONTINUED | OUTPATIENT
Start: 2019-05-01 | End: 2019-05-01 | Stop reason: HOSPADM

## 2019-05-01 RX ORDER — FLUMAZENIL 0.1 MG/ML
0.2 INJECTION, SOLUTION INTRAVENOUS
Status: DISCONTINUED | OUTPATIENT
Start: 2019-05-01 | End: 2019-05-01 | Stop reason: HOSPADM

## 2019-05-01 RX ORDER — ONDANSETRON 4 MG/1
4 TABLET, ORALLY DISINTEGRATING ORAL EVERY 6 HOURS PRN
Status: DISCONTINUED | OUTPATIENT
Start: 2019-05-01 | End: 2019-05-01 | Stop reason: HOSPADM

## 2019-05-01 RX ORDER — FENTANYL CITRATE 50 UG/ML
INJECTION, SOLUTION INTRAMUSCULAR; INTRAVENOUS PRN
Status: DISCONTINUED | OUTPATIENT
Start: 2019-05-01 | End: 2019-05-01 | Stop reason: HOSPADM

## 2019-05-01 RX ORDER — ONDANSETRON 2 MG/ML
4 INJECTION INTRAMUSCULAR; INTRAVENOUS EVERY 6 HOURS PRN
Status: DISCONTINUED | OUTPATIENT
Start: 2019-05-01 | End: 2019-05-01 | Stop reason: HOSPADM

## 2019-05-01 RX ORDER — SIMETHICONE
LIQUID (ML) MISCELLANEOUS PRN
Status: DISCONTINUED | OUTPATIENT
Start: 2019-05-01 | End: 2019-05-01 | Stop reason: HOSPADM

## 2019-05-01 RX ORDER — LIDOCAINE 40 MG/G
CREAM TOPICAL
Status: DISCONTINUED | OUTPATIENT
Start: 2019-05-01 | End: 2019-05-01 | Stop reason: HOSPADM

## 2019-05-01 NOTE — DISCHARGE INSTRUCTIONS
Discharge Instructions after  Upper Endoscopy (EGD) by Dr Swanson    Activity and Diet  You were given medicine for pain. You may be dizzy or sleepy.  For 24 hours:    Do not drive or use heavy equipment.    Do not make important decisions.    Do not drink any alcohol.  __X_ You may return to your regular diet.    Discomfort  You may have a sore throat for 2 to 3 days. It may help to:    Avoid hot liquids for 24 hours.    Use sore throat lozenges.    Gargle as needed with salt water up to 4 times a day. Mix 1 cup of warm water  with 1 teaspoon of salt. Do not swallow.  You may take Tylenol (acetaminophen) for pain unless your doctor has told you not to.    Follow-up  _X__ Follow-up in clinic to discuss possible Hiatal Hernia repair.    When to call us:  Problems are rare. Call right away if you have:    Unusual throat pain or trouble swallowing    Unusual pain in belly or chest that is not relieved by belching or passing air    Black stools (tar-like looking bowel movement)    Temperature above 100.6  F. (37.5  C).    If you vomit blood or have severe pain, go to an emergency room.    If you have questions, call:  Monday to Friday, 7 a.m. to 4:30 p.m.: Endoscopy: [phone] (We may have to call you back)    After hours: Hospital: [phone] (Ask for the GI fellow on call)

## 2019-05-09 ENCOUNTER — OFFICE VISIT (OUTPATIENT)
Dept: SURGERY | Facility: CLINIC | Age: 39
End: 2019-05-09
Payer: MEDICAID

## 2019-05-09 VITALS
WEIGHT: 215 LBS | HEIGHT: 62 IN | OXYGEN SATURATION: 96 % | DIASTOLIC BLOOD PRESSURE: 65 MMHG | HEART RATE: 72 BPM | TEMPERATURE: 98.5 F | SYSTOLIC BLOOD PRESSURE: 104 MMHG | BODY MASS INDEX: 39.56 KG/M2

## 2019-05-09 DIAGNOSIS — K43.2 INCISIONAL HERNIA, WITHOUT OBSTRUCTION OR GANGRENE: Primary | ICD-10-CM

## 2019-05-09 DIAGNOSIS — K44.9 HIATAL HERNIA WITH GERD: ICD-10-CM

## 2019-05-09 DIAGNOSIS — K21.9 HIATAL HERNIA WITH GERD: ICD-10-CM

## 2019-05-09 LAB — UPPER GI ENDOSCOPY: NORMAL

## 2019-05-09 ASSESSMENT — PAIN SCALES - GENERAL: PAINLEVEL: SEVERE PAIN (6)

## 2019-05-09 ASSESSMENT — MIFFLIN-ST. JEOR: SCORE: 1619.86

## 2019-05-09 NOTE — PROGRESS NOTES
Return Bariatric Surgery Note    RE: Brianna Brewer  MR#: [id number]  : 1980  VISIT DATE: May 9, 2019    Dear Dr Kramer,    I had the pleasure of seeing your patient, Brianna Brewer, in my post-bariatric surgery assessment clinic.    CHIEF COMPLAINT: Post-bariatric surgery follow-up      I note that Brianna first came to surgical attention in relationship to an abdominal wall hernia in the lower midline.  This is a large hernia.  She was referred initially to Dr. Finch and Dr. aKtz for consideration of repair.  Weight loss was recommended and she underwent sleeve gastrectomy for this.    Recent upper endoscopy documented presence of hiatal hernia and this is related to GERD.    Would repair hiatal hernia with laparoscopic approach.  I will defer hernia approach to Abena Finch and [place], to be discussed at hernia conference.  She is current smoker, which makes planning for surgery more difficult; she has, however, lost a significant amount of weight, which makes consideration of repair appropriate at some time in the future.    HISTORY OF PRESENT ILLNESS:  Questions Regarding Prior Weight Loss Surgery Reviewed With Patient 2019   I had the following weight loss procedure: Sleeve Gastrectomy   What year was your surgery? 2018   How has your weight changed since your last visit? I have stayed about the same   Are you currently taking any weight loss medications? No   Do you currently have any of the following: Heartburn, acid reflux, or GERD (acid reflux disease)?   Have you been to the Emergency room since your last visit with us? No   Were you in the hospital since your last visit with us? No   Do you have any concerns today? pain in esophagus     Pt is 9 months s/p sleeve gastrectomy. She has significant pain and globus sensation when swallowing, and also has heartburn symptoms. She had a recent EGD revealing hiatal hernia as well. Of note, she has resumed smoking in the  last 3-4 weeks. She presents to clinic today to discuss hiatal hernia repaior. She also would like to addresss her recurrence lower abdominal wall hernia at the site of prior  PFannensteil. The hernia is bothersome but has not caused obstructive symptoms. Most recent available CT shows a 6x5cm defect, though she was re-scanned at [facility] (we have inquired and will review at hernia conference).     Weight History:     2019   What is your highest lifetime weight? 350   What is your lowest weight since surgery? (In pounds) 214     Initial Weight: 130.3 kg (287 lb 4.8 oz)  Current Weight: Weight: 97.5 kg (215 lb)  Cumulative weight loss (lbs): 72.3  Last Visits Weight: 99.4 kg (219 lb 1.6 oz)    Questions Regarding Co-Morbidities and Health Concerns Reviewed With Patient 2019   Pre-diabetes: Never   Diabetes II: Never   High Blood Pressure: Never   High cholesterol: Never   Heartburn/Reflux: Worsened   Are you taking daily medication for heartburn, acid reflux, or GERD (acid reflux disease)? Yes   Sleep apnea: Never   PCOS: Never   Back pain: Never   Joint pain: Never   Lower leg swelling: Never       Eating Habits 2019   How many meals do you eat per day? 1   Do you snack between meals? Yes   How much food are you eating at each meal? Less than 1/2 cup   Are you able to separate your meals and liquids by at least 30 minutes? Sometimes   Are you able to avoid liquid calories? No       Exercise Questions Reviewed With Patient 2019   How often do you exercise? Never   What keeps you from being more active?  Pain, My ability to walk or move around is limited       Social History:      2019   Are you smoking? Yes   How much are you smoking? 6   Are you drinking alcohol? No       Medications:  Current Outpatient Medications   Medication     cyanocobalamin (VITAMIN B12) 1000 MCG/ML injection     oxyCODONE IR (ROXICODONE) 5 MG tablet     ranitidine (ZANTAC) 150 MG tablet      "Syringe/Needle, Disp, (BD ECLIPSE SYRINGE) 25G X 5/8\" 3 ML MISC     acetaminophen (TYLENOL) 325 MG tablet     Cholecalciferol (D3-1000) 1000 UNITS CAPS     FIBER PO     FLUoxetine (PROZAC) 40 MG capsule     hydrOXYzine (ATARAX) 50 MG tablet     levothyroxine (SYNTHROID/LEVOTHROID) 75 MCG tablet     omeprazole (PRILOSEC) 20 MG CR capsule     ondansetron (ZOFRAN-ODT) 4 MG ODT tab     senna-docusate (SENOKOT-S/PERICOLACE) 8.6-50 MG tablet     sucralfate (CARAFATE) 1 GM tablet     topiramate (TOPAMAX) 25 MG tablet     No current facility-administered medications for this visit.          5/9/2019   Do you avoid NSAIDs such as (Ibuprofen, Aleve, Naproxen, Advil)?   No       ROS:  GI:      5/9/2019   Vomiting: Yes   Diarrhea: No   Constipation: Yes   Swallowing trouble: Yes   Abdominal pain: Yes   Heartburn: Yes   Rash in skin folds: No   Depression: Yes   Stress urinary incontinence No     Skin:   BAR RBS ROS - SKIN 5/9/2019   Rash in skin folds: No     Psych:      5/9/2019   Depression: Yes   Anxiety: Yes     Female Only:   BAR RBS ROS - FEMALE ONLY 5/9/2019   Female only: Irregular menstrual cycles       LABS/IMAGING/MEDICAL RECORDS REVIEW: CT (pre-sleeve) reviewed; newer CT requested    PHYSICAL EXAMINATION:  /65 (BP Location: Left arm, Patient Position: Sitting, Cuff Size: Adult Large)   Pulse 72   Temp 98.5  F (36.9  C) (Oral)   Ht 1.585 m (5' 2.4\")   Wt 97.5 kg (215 lb)   LMP 05/01/2019 (Exact Date)   SpO2 96%   BMI 38.82 kg/m     aaox3  rrr  ctab  abd obese s/nt/nd w pannus and palpable Pfannensteil hernia. All other scars c/d/i, well healed.   Ext w/wp    ASSESSMENT AND PLAN:      1. 9 months status laparoscopic gastric sleeve  2. Morbid Obesity - current BMI: Body mass index is 38.82 kg/m .  3. Recommendations included continued weight loss and mandatory SMOKING CESSATION   4. [facility] and add on for May 2019 Hernia conference- if open approach is the consensus, will " plan for staged repair AFTER laparoscopic hiatal hernia repair (would need smoking cessation and visit [facility] Dr. Katz). If laparoscopic approach deemed feasible, she would be a candidate for laparoscopic IPOM at the time of hiatal hernia repair.  5. Laparoscopic hiatal hernia repair; can be scheduled after hernia conference decision point is resolved; at that time, PAC and Periop to be entered.    Pt seen and evaluated with Dr Swanson.    Sincerely,    Ruddy Gasca MD    I spent a total of 25 minutes face to face with Brianna during today's office visit. Over 50% of this time was spent counseling the patient and/or coordinating care.    Physician Attestation  I, Zoran Swanson, saw and evaluated this patient as part of a shared visit.  I have reviewed and discussed with the advanced practice provider and/or resident their history, physical and plan.    I personally reviewed the vital signs, medications, labs and imaging.    My key history or physical exam findings: incisional and hiatal hernias causing symptoms; should be repaired, but not until smoking cessation; will review at hernia conference and provide recommendations.    Key management decisions made by me: as noted.    I spent 25 minutes with Brianna and over half of the time involved counseling and coordination of cares.      Zoran Swanson MD  Date of Service (when I saw the patient): 5/9/19

## 2019-05-09 NOTE — NURSING NOTE
"(   Chief Complaint   Patient presents with     RECHECK     RBS, discuss hernia(s)    )    ( Weight: 97.5 kg (215 lb) )  ( Height: 158.5 cm (5' 2.4\") )  ( BMI (Calculated): 38.82 )  ( Initial Weight: 130.3 kg (287 lb 4.8 oz) )  ( Cumulative weight loss (lbs): 72.3 )  ( Last Visits Weight: 99.4 kg (219 lb 1.6 oz) )  ( Wt change since last visit (lbs): -4.1 )  (   )  (   )    ( BP: 104/65 )  (   )  ( Temp: 98.5  F (36.9  C) )  ( Temp src: Oral )  ( Pulse: 72 )  (   )  ( SpO2: 96 % )    (   Patient Active Problem List   Diagnosis     Ventral hernia without obstruction or gangrene     Morbid obesity (H)     Depression     Morbid (severe) obesity due to excess calories (H)     Dehydration     S/P laparoscopic sleeve gastrectomy    )  (   Current Outpatient Medications   Medication Sig Dispense Refill     cyanocobalamin (VITAMIN B12) 1000 MCG/ML injection Inject 1 mL (1,000 mcg) Subcutaneous every 30 days 1 mL 11     oxyCODONE IR (ROXICODONE) 5 MG tablet Take 5 mg by mouth every 8 hours as needed   0     ranitidine (ZANTAC) 150 MG tablet Take 150 mg by mouth 2 times daily       Syringe/Needle, Disp, (BD ECLIPSE SYRINGE) 25G X 5/8\" 3 ML MISC 1 Syringe every 30 days 1 each 11     acetaminophen (TYLENOL) 325 MG tablet Take 2 tablets (650 mg) by mouth every 6 hours as needed for other (multimodal surgical pain management along with NSAIDS and opioid medication as indicated based on pain control and physical function.) (Patient not taking: Reported on 5/9/2019) 30 tablet 0     Cholecalciferol (D3-1000) 1000 UNITS CAPS Take 3,000 Units by mouth every morning        FIBER PO Take 5 capsules by mouth every morning       FLUoxetine (PROZAC) 40 MG capsule Take 40 mg by mouth every morning        hydrOXYzine (ATARAX) 50 MG tablet Take  mg by mouth as needed        levothyroxine (SYNTHROID/LEVOTHROID) 75 MCG tablet Take 75 mcg by mouth every morning        omeprazole (PRILOSEC) 20 MG CR capsule Take 1 capsule (20 mg) by mouth 2 " times daily (Patient not taking: Reported on 5/9/2019) 60 capsule 0     ondansetron (ZOFRAN-ODT) 4 MG ODT tab Take 1 tablet (4 mg) by mouth every 4 hours as needed for nausea or vomiting (Patient not taking: Reported on 5/9/2019) 30 tablet 0     senna-docusate (SENOKOT-S/PERICOLACE) 8.6-50 MG tablet Take 1 tablet by mouth daily as needed for constipation (Patient not taking: Reported on 5/9/2019) 90 tablet 3     sucralfate (CARAFATE) 1 GM tablet Take 1 tablet (1 g) by mouth 4 times daily (Patient not taking: Reported on 5/9/2019) 120 tablet 3     topiramate (TOPAMAX) 25 MG tablet Take 2 tablets (50 mg) by mouth 2 times daily (Patient not taking: Reported on 5/9/2019) 360 tablet 3    )  ( Diabetes Eval:    )    ( Pain Eval:  Severe Pain (6) )    ( Wound Eval:       )    (   History   Smoking Status     Current Every Day Smoker     Packs/day: 0.50     Years: 20.00     Types: Cigarettes     Start date: 1/1/1998     Last attempt to quit: 5/8/2016   Smokeless Tobacco     Never Used    )    ( Signed By:  Elisabeth Escobedo; May 9, 2019; 9:34 AM )

## 2019-05-10 ENCOUNTER — DOCUMENTATION ONLY (OUTPATIENT)
Dept: SURGERY | Facility: CLINIC | Age: 39
End: 2019-05-10

## 2019-05-10 NOTE — PROGRESS NOTES
Called [facility] film room and the lady is pushing CTs.    Images resolved in PACS.

## 2019-06-03 ENCOUNTER — TEAM CONFERENCE (OUTPATIENT)
Dept: SURGERY | Facility: CLINIC | Age: 39
End: 2019-06-03

## 2019-06-03 NOTE — TELEPHONE ENCOUNTER
"Complex Hernia Monthly Conference Note   Date: Shantell 3, 2019    Attendees: Dr. Swanson, Dr. Montoya, Dr. Ruggiero, Dr. Gasca,Meera Arroyo RN, Liam Buchanan RN    Type of hernia: hiatal hernia, \"Phansteel hernia\"    Estimated body mass index is 38.82 kg/m  as calculated from the following:    Height as of 19: 1.585 m (5' 2.4\").    Weight as of 19: 97.5 kg (215 lb).    Wt Readings from Last 4 Encounters:   19 97.5 kg (215 lb)   04/10/19 99.4 kg (219 lb 1.6 oz)   19 106.6 kg (235 lb 1.6 oz)   10/04/18 123.7 kg (272 lb 9.6 oz)       History   Smoking Status     Current Every Day Smoker     Packs/day: 0.50     Years: 20.00     Types: Cigarettes     Start date: 1998     Last attempt to quit: 2016   Smokeless Tobacco     Never Used       Weight at time of initial consult: 97.5 kg    HgbA1C: No results found for: A1C    Lab Results   Component Value Date    ALBUMIN 3.7 02/15/2018       Is this a re-occurrence of hernia: no    Is mesh in place: no  Has there been multiple abdominal surgeries/previous repair: yes most recent Laparascopic Sleeve gastrectomy.    Fistulas: no  Is hernia is greater than 5 cm: no   Multiple hernias present:no  Is patient immunosuppressed: no  PAST MEDICAL HISTORY:   Past Medical History:   Diagnosis Date     Anxiety      Chronic diarrhea 2017    Contracted c-diff during surgery     Depressive disorder 2000     History of blood transfusion 2017    3 units of blood during      Hypertension 2017    During pregnancy pre-eclampsia     Hypothyroidism      Morbid obesity with BMI of 50.0-59.9, adult (H)      Preeclampsia      Vasovagal response 2017     Ventral hernia        PAST SURGICAL HISTORY:   Past Surgical History:   Procedure Laterality Date     ABDOMEN SURGERY  2017          SECTION  2017    complicated be dehis     COLONOSCOPY  2017     ESOPHAGOSCOPY, GASTROSCOPY, DUODENOSCOPY (EGD), COMBINED N/A 2019    Procedure: " ESOPHAGOGASTRODUODENOSCOPY (EGD);  Surgeon: Zoran Swanson MD;  Location: [facility] GI     LAPAROSCOPIC GASTRIC SLEEVE N/A 8/28/2018    Procedure: LAPAROSCOPIC GASTRIC SLEEVE;  Laparoscopic Sleeve Gastrectomy;  Surgeon: Zoran Swanson MD;  Location: [facility] OR       Patient Plan:    CT reviewed: yes    Patient needs to be smoke free for 6 months then return to clinic to see both Dr. Katz and Dr. Finch to determine laparascopic or open, panniculectomy at the same time?    Nictotine/Continine Test needed: yes          Patient was contacted by: Liam Buchanan RN regarding plan of care. Patient verbalizes understanding of what is being required.

## 2019-06-04 DIAGNOSIS — K43.2 INCISIONAL HERNIA, WITHOUT OBSTRUCTION OR GANGRENE: Primary | ICD-10-CM

## 2019-06-04 DIAGNOSIS — Z98.84 S/P LAPAROSCOPIC SLEEVE GASTRECTOMY: ICD-10-CM

## 2019-09-26 DIAGNOSIS — Z98.84 STATUS POST BARIATRIC SURGERY: ICD-10-CM

## 2019-09-29 NOTE — TELEPHONE ENCOUNTER
cyanocobalamin (VITAMIN B12) 1000 MCG/ML injection    Last Written Prescription Date:  10/4/18  Last Fill Quantity: 1ml,   # refills: 11  Last Office Visit : 5/9/19  Future Office visit: none      Routing refill request to provider for review/approval because: not on protocol. no f/u  appt.

## 2019-09-30 RX ORDER — CYANOCOBALAMIN 1000 UG/ML
1 INJECTION, SOLUTION INTRAMUSCULAR; SUBCUTANEOUS
Qty: 1 ML | Refills: 11 | Status: SHIPPED | OUTPATIENT
Start: 2019-09-30 | End: 2020-05-19

## 2020-01-20 NOTE — LETTER
"2018       RE: Brianna Brewer  [address] [facility] [address]     Dear Colleague,    Thank you for referring your patient, Brianna Brewer, to the [facility] MEDICAL WEIGHT MANAGEMENT at [facility]. Please see a copy of my visit note below.    Return Medical Weight Management Note     Brianna Brewer  MRN:  [id number]  :  1980  SINCERE:  2018    Dear Yaya Kramer,    I had the pleasure of seeing your patient Brianna Brewer.  She is a 37 year old female who I am continuing to see for treatment of obesity related to:       2017   I have the following co-morbidities associated with obesity: Lower Extremity Edema, GERD (Reflux)     \"37 year old female h/o BMI 53, hypothyroidism, who underwent a low transverse  in August of this year, complicated by dehisence and recurrent C diff. Went on to develop a low ventral hernia without obstructive symptoms.\"    INTERVAL HISTORY:  Return [facility] visit.  Last saw Dr Finch in Dec and plan for weight loss before ventral hernia repair.  Saw Dr Huang 17 and topiramate started.    CURRENT WEIGHT:   307 lbs 9.6 oz    Wt Readings from Last 4 Encounters:   18 (!) 307 lb 9.6 oz   17 295 lb   17 294 lb 9.6 oz   17 294 lb 9.6 oz       Height:  5' 2\"  Body Mass Index:  Body mass index is 56.26 kg/(m^2).  Vitals:  /82  Pulse 68  Ht 5' 2\"  Wt (!) 307 lb 9.6 oz  LMP 2017 (Exact Date)  SpO2 100%  BMI 56.26 kg/m2    Initial consult weight was 290 on 17.  Weight change since last seen is up 17 pounds.   Total gain is 17 pounds.    Diet and Activity Changes Since Last Visit Reviewed With Patient 2018   I have made the following changes to my diet since my last visit: Stopped drinking soda completely   With regards to my diet, I am still struggling with: Eating snack foods   For breakfast, I typically eat: Peanut butter toast   For lunch, I " typically eat: Smart one microwave meal   For supper, I typically eat: Snack food   For snack(s), I typically eat: Chips and dip   I have made the following changes to my activity/exercise since my last visit: None   With regards to my activity/exercise, I am still struggling with: I can't exercise       Review of Systems     Constitutional:  Positive for weight gain, fatigue, decreased appetite, night sweats, recent stressors, post-operative complications, incisional pain and increased energy. Negative for fever, chills, weight loss, height loss, hallucinations, hyperactivity and confused.   HENT:  Negative for ear pain, hearing loss, tinnitus, nosebleeds, trouble swallowing, hoarse voice, mouth sores, sore throat, ear discharge, tooth pain, gum tenderness, taste disturbance, smell disturbance, hearing aid, bleeding gums, dry mouth, sinus pain, sinus congestion and neck mass.    Eyes:  Positive for double vision, spots and floaters. Negative for pain, redness, eye pain, decreased vision, eye watering, eye bulging, eye dryness, flashing lights, strabismus, tunnel vision, jaundice and eye irritation.   Respiratory:   Negative for cough, hemoptysis, sputum production, shortness of breath, wheezing, sleep disturbances due to breathing, snores loudly, respiratory pain, dyspnea on exertion, cough disturbing sleep and postural dyspnea.    Cardiovascular:  Negative for chest pain, dyspnea on exertion, palpitations, orthopnea, claudication, leg swelling, fingers/toes turn blue, hypertension, hypotension, syncope, history of heart murmur, chest pain on exertion, chest pain at rest, pacemaker, few scattered varicosities, leg pain, sleep disturbances due to breathing, tachycardia, light-headedness, exercise intolerance and edema.   Gastrointestinal:  Positive for heartburn, nausea, vomiting, abdominal pain, diarrhea, blood in stool, rectal pain, bloating, bowel incontinence and change in stool. Negative for constipation,  melena, jaundice and coffee ground emesis.   Genitourinary:  Negative for bladder incontinence, dysuria, urgency, hematuria, flank pain, vaginal discharge, difficulty urinating, genital sores, dyspareunia, decreased libido, nocturia, voiding less frequently, arousal difficulty, abnormal vaginal bleeding, excessive menstruation, menstrual changes, hot flashes, vaginal dryness and postmenopausal bleeding.   Musculoskeletal:  Negative for myalgias, back pain, joint swelling, arthralgias, stiffness, muscle cramps, neck pain, bone pain, muscle weakness and fracture.   Skin:  Negative for nail changes, itching, poor wound healing, rash, hair changes, skin changes, acne, warts, poor wound healing, scarring, flaky skin, Raynaud's phenomenon, sensitivity to sunlight and skin thickening.   Neurological:  Negative for dizziness, tingling, tremors, speech change, seizures, loss of consciousness, weakness, light-headedness, numbness, headaches, disturbances in coordination, extremity numbness, memory loss, difficulty walking and paralysis.   Endo/Heme:  Negative for anemia, swollen glands and bruises/bleeds easily.   Psychiatric/Behavioral:  Positive for depression, decreased concentration, mood swings and panic attacks. Negative for hallucinations and memory loss.    Breast:  Negative for breast discharge, breast mass, breast pain and nipple retraction.   Endocrine:  Positive for altered temperature regulation, polyphagia and polydipsia.Negative for unwanted hair growth and change in facial hair.      MEDICATIONS:   Current Outpatient Prescriptions   Medication     topiramate (TOPAMAX) 25 MG tablet     topiramate (TOPAMAX) 25 MG tablet     Cholecalciferol (D3-1000) 1000 UNITS CAPS     FLUoxetine (PROZAC) 40 MG capsule     levothyroxine (SYNTHROID/LEVOTHROID) 75 MCG tablet     Prenatal MV-Min-Fe Fum-FA-DHA (PRENATAL MULTIVITAMIN + DHA) 28-0.8 & 200 MG MISC     acetaminophen (TYLENOL) 325 MG tablet     hydrOXYzine (ATARAX) 50 MG  tablet     No current facility-administered medications for this visit.        Weight Loss Medication History Reviewed With Patient 1/9/2018   Which weight loss medications are you currently taking on a regular basis?  Topamax (topiramate)   Are you having any side effects from the weight loss medication that we have prescribed you? No       ASSESSMENT:   37 y.o. Female here for MW follow up and to discuss possible sleeve gastrectomy.  She has gained weight and hasn't noticed a difference in hunger with the topiramate but she has stopped drinking soda completely.      PLAN:   Increase topiramate to 50mg twice daily  Start phentermine 15mg every morning. Risks and side effects reviewed  Need to watch bariatric seminar [email or web address].[email or web address]  See dietitian in 1 month  See Renetta Rossi in 1 month for NBS visit   Goal weight is 290 lbs.   Psychiatric evaluation, schedule as soon as possible  Surgery timing will be dependent on weight loss, see RD today and possible partial liquid diet?      FOLLOW-UP:    4 weeks.    Time: 20 min spent on evaluation, management, counseling, education, & motivational interviewing with greater than 50 % of the total time was spent on counseling and coordinating care    Sincerely,    Renetta Rossi PA-C     84

## 2020-03-11 ENCOUNTER — HEALTH MAINTENANCE LETTER (OUTPATIENT)
Age: 40
End: 2020-03-11

## 2020-05-08 DIAGNOSIS — Z98.84 STATUS POST BARIATRIC SURGERY: ICD-10-CM

## 2020-05-13 RX ORDER — SYRINGE WITH NEEDLE, 1 ML 25GX5/8"
SYRINGE, EMPTY DISPOSABLE MISCELLANEOUS
OUTPATIENT
Start: 2020-05-13

## 2020-05-13 NOTE — TELEPHONE ENCOUNTER
Pt needs appt  LCV: 5-9-19  NCV none  FYI to clinic RN  Scheduling has been notified to contact the pt for appointment.

## 2020-05-19 ENCOUNTER — VIRTUAL VISIT (OUTPATIENT)
Dept: SURGERY | Facility: CLINIC | Age: 40
End: 2020-05-19
Payer: MEDICAID

## 2020-05-19 VITALS — WEIGHT: 185 LBS | HEIGHT: 62 IN | BODY MASS INDEX: 34.04 KG/M2

## 2020-05-19 DIAGNOSIS — K21.9 HIATAL HERNIA WITH GERD: ICD-10-CM

## 2020-05-19 DIAGNOSIS — E03.9 HYPOTHYROIDISM, UNSPECIFIED TYPE: ICD-10-CM

## 2020-05-19 DIAGNOSIS — K44.9 HIATAL HERNIA WITH GERD: ICD-10-CM

## 2020-05-19 DIAGNOSIS — Z98.84 S/P LAPAROSCOPIC SLEEVE GASTRECTOMY: Primary | ICD-10-CM

## 2020-05-19 RX ORDER — BIOTIN 10 MG
1 TABLET ORAL 2 TIMES DAILY
Qty: 60 TABLET | Refills: 11 | Status: SHIPPED | OUTPATIENT
Start: 2020-05-19

## 2020-05-19 RX ORDER — OMEPRAZOLE 40 MG/1
40 CAPSULE, DELAYED RELEASE ORAL DAILY
Qty: 60 CAPSULE | Refills: 3 | Status: SHIPPED | OUTPATIENT
Start: 2020-05-19

## 2020-05-19 RX ORDER — CYANOCOBALAMIN 1000 UG/ML
1 INJECTION, SOLUTION INTRAMUSCULAR; SUBCUTANEOUS
Qty: 1 ML | Refills: 11 | Status: SHIPPED | OUTPATIENT
Start: 2020-05-19

## 2020-05-19 RX ORDER — GLUCOSAMINE HCL 500 MG
1 TABLET ORAL DAILY
Qty: 30 TABLET | Refills: 11 | Status: SHIPPED | OUTPATIENT
Start: 2020-05-19

## 2020-05-19 ASSESSMENT — MIFFLIN-ST. JEOR: SCORE: 1478.78

## 2020-05-19 ASSESSMENT — PAIN SCALES - GENERAL: PAINLEVEL: NO PAIN (0)

## 2020-05-19 NOTE — NURSING NOTE
"(   Chief Complaint   Patient presents with     RECHECK     Bariatric surgery follow up.    )    ( Weight: 83.9 kg (185 lb)(Pt reported) )  ( Height: 158.5 cm (5' 2.4\")(Pt reported) )  ( BMI (Calculated): 33.4 )  (   )  ( Cumulative weight loss (lbs): 102.3 )  ( Last Visits Weight: 97.5 kg (215 lb) )  ( Wt change since last visit (lbs): -30 )  (   )  (   )    (   )  (   )  (   )  (   )  (   )  (   )  (   )    (   Patient Active Problem List   Diagnosis     Ventral hernia without obstruction or gangrene     Morbid obesity (H)     Depression     Morbid (severe) obesity due to excess calories (H)     Dehydration     S/P laparoscopic sleeve gastrectomy    )  (   Current Outpatient Medications   Medication Sig Dispense Refill     Cholecalciferol (D3-1000) 1000 UNITS CAPS Take 3,000 Units by mouth every morning        cyanocobalamin (CYANOCOBALAMIN) 1000 MCG/ML injection INJECT 1 ML (1,000 MCG) SUBCUTANEOUS EVERY 30 DAYS 1 mL 11     FIBER PO Take 5 capsules by mouth every morning       FLUoxetine (PROZAC) 40 MG capsule Take 40 mg by mouth every morning        hydrOXYzine (ATARAX) 50 MG tablet Take  mg by mouth as needed        levothyroxine (SYNTHROID/LEVOTHROID) 75 MCG tablet Take 75 mcg by mouth every morning        oxyCODONE IR (ROXICODONE) 5 MG tablet Take 5 mg by mouth every 8 hours as needed   0     Syringe/Needle, Disp, (BD ECLIPSE SYRINGE) 25G X 5/8\" 3 ML MISC 1 Syringe every 30 days 1 each 11     acetaminophen (TYLENOL) 325 MG tablet Take 2 tablets (650 mg) by mouth every 6 hours as needed for other (multimodal surgical pain management along with NSAIDS and opioid medication as indicated based on pain control and physical function.) (Patient not taking: Reported on 5/9/2019) 30 tablet 0     omeprazole (PRILOSEC) 20 MG CR capsule Take 1 capsule (20 mg) by mouth 2 times daily (Patient not taking: Reported on 5/9/2019) 60 capsule 0     ondansetron (ZOFRAN-ODT) 4 MG ODT tab Take 1 tablet (4 mg) by mouth every 4 " hours as needed for nausea or vomiting (Patient not taking: Reported on 5/9/2019) 30 tablet 0     ranitidine (ZANTAC) 150 MG tablet Take 150 mg by mouth 2 times daily       senna-docusate (SENOKOT-S/PERICOLACE) 8.6-50 MG tablet Take 1 tablet by mouth daily as needed for constipation (Patient not taking: Reported on 5/9/2019) 90 tablet 3     sucralfate (CARAFATE) 1 GM tablet Take 1 tablet (1 g) by mouth 4 times daily (Patient not taking: Reported on 5/9/2019) 120 tablet 3     topiramate (TOPAMAX) 25 MG tablet Take 2 tablets (50 mg) by mouth 2 times daily (Patient not taking: Reported on 5/9/2019) 360 tablet 3    )  ( Diabetes Eval:    )    ( Pain Eval:  No Pain (0) )    ( Wound Eval:       )    (   History   Smoking Status     Current Every Day Smoker     Packs/day: 0.50     Years: 20.00     Types: Cigarettes     Start date: 1/1/1998   Smokeless Tobacco     Never Used    )    ( Signed By:  Catherine Latham CMA; May 19, 2020; 8:58 AM )

## 2020-05-19 NOTE — PROGRESS NOTES
"Brianna Brewer is a 39 year old female who is being evaluated via a billable video visit.      The patient has been notified of following:     \"This video visit will be conducted via a call between you and your physician/provider. We have found that certain health care needs can be provided without the need for an in-person physical exam.  This service lets us provide the care you need with a video conversation.  If a prescription is necessary we can send it directly to your pharmacy.  If lab work is needed we can place an order for that and you can then stop by our lab to have the test done at a later time.    Video visits are billed at different rates depending on your insurance coverage.  Please reach out to your insurance provider with any questions.    If during the course of the call the physician/provider feels a video visit is not appropriate, you will not be charged for this service.\"    Patient has given verbal consent for Video visit? Yes    How would you like to obtain your AVS? MyChart    Patient would like the video invitation sent by: cell phone    Will anyone else be joining your video visit? No          Return Bariatric Surgery Note    RE: Brianna Brewer  MR#: [id number]  : 1980  VISIT DATE: May 19, 2020    Dear Yaya Kramer,    I had the pleasure of seeing your patient, Brianna Brewer, in my post-bariatric surgery assessment clinic.    CHIEF COMPLAINT: Post-bariatric surgery follow-up    HISTORY OF PRESENT ILLNESS:  Questions Regarding Prior Weight Loss Surgery Reviewed With Patient 2020   I had the following weight loss procedure: Sleeve Gastrectomy   What year was your surgery? 2018   How has your weight changed since your last visit? I have lost weight   Are you currently taking any weight loss medications? No   Do you currently have any of the following: Heartburn, acid reflux, or GERD (acid reflux disease)?   Have you been to the Emergency room since your last " visit with us? Yes   Were you in the hospital since your last visit with us? No   Do you have any concerns today? Issues with hiatal hernia     Last seen 5/9/19 Dr Kiki Busby 2018 to lose weight prior to abd hernia repair  Last year discussed at hernia conference plan for HH repair and abd hernia plan  Plan to stop smoking for 6 months before planning surgery  Chantix caused N/V  She is going to try nicotine patches  She has daily pain GERD and HH.   She has been taking ranitidine, on recall but she has leftover that she is still taking.  Stopped fluoxetine with PCP and changed to Cymbalta but not started it.    Weight History:     5/19/2020   What is your highest lifetime weight? 330   What is your lowest weight since surgery? (In pounds) 175     Initial Weight (lbs): 287.3 lbs  Weight: 83.9 kg (185 lb)(Pt reported)  Last Visits Weight: 97.5 kg (215 lb)  Cumulative weight loss (lbs): 102.3  Weight Loss Percentage: 35.61%    Questions Regarding Co-Morbidities and Health Concerns Reviewed With Patient 5/19/2020   Pre-diabetes: Never   Diabetes II: Never   High Blood Pressure: Never   High cholesterol: Never   Heartburn/Reflux: Worsened   Are you taking daily medication for heartburn, acid reflux, or GERD (acid reflux disease)? Yes   Sleep apnea: Never   PCOS: Never   Back pain: Improved   Joint pain: Improved   Lower leg swelling: Improved       Eating Habits 5/19/2020   How many meals do you eat per day? 2   Do you snack between meals? Sometimes   How much food are you eating at each meal? 1/2 cup to 1 cup   Are you able to separate your meals and liquids by at least 30 minutes? No   Are you able to avoid liquid calories? No       Exercise Questions Reviewed With Patient 5/19/2020   How often do you exercise? 5 to 6 times per week   What is the duration of your exercise (in minutes)? 30 Minutes   What types of exercise do you do? walking   What keeps you from being more active?  Pain       Social History:       "5/19/2020   Are you smoking? Yes   How much are you smoking? 0.5-1 pack a day   Are you drinking alcohol? Yes   How much alcohol? Once a week       Medications:  Current Outpatient Medications   Medication     calcium Citrate-vitamin D 500-400 MG-UNIT CHEW     Cholecalciferol (D3-1000) 1000 UNITS CAPS     Cholecalciferol (VITAMIN D3) 75 MCG (3000 UT) TABS     cyanocobalamin (CYANOCOBALAMIN) 1000 MCG/ML injection     cyanocobalamin (CYANOCOBALAMIN) 1000 MCG/ML injection     FIBER PO     FLUoxetine (PROZAC) 40 MG capsule     hydrOXYzine (ATARAX) 50 MG tablet     levothyroxine (SYNTHROID/LEVOTHROID) 75 MCG tablet     Multiple Vitamins-Minerals (MULTIVITAMIN ADULT) CHEW     omeprazole (PRILOSEC) 40 MG DR capsule     oxyCODONE IR (ROXICODONE) 5 MG tablet     Syringe/Needle, Disp, (BD ECLIPSE SYRINGE) 25G X 5/8\" 3 ML MISC     acetaminophen (TYLENOL) 325 MG tablet     ranitidine (ZANTAC) 150 MG tablet     senna-docusate (SENOKOT-S/PERICOLACE) 8.6-50 MG tablet     sucralfate (CARAFATE) 1 GM tablet     topiramate (TOPAMAX) 25 MG tablet     No current facility-administered medications for this visit.          5/19/2020   Do you avoid NSAIDs such as (Ibuprofen, Aleve, Naproxen, Advil)?   Yes       ROS:  GI:      5/19/2020   Vomiting: Yes   Diarrhea: Yes   Constipation: Yes   Swallowing trouble: Yes   Abdominal pain: Yes   Heartburn: Yes   Rash in skin folds: Yes   Depression: Yes   Stress urinary incontinence No     Skin:   BAR RBS ROS - SKIN 5/19/2020   Rash in skin folds: Yes     Psych:      5/19/2020   Depression: Yes   Anxiety: Yes     Female Only:   BAR RBS ROS - FEMALE ONLY 5/19/2020   Female only: Regular menstrual cycles       LABS/IMAGING/MEDICAL RECORDS REVIEW:     PHYSICAL EXAMINATION:  Ht 1.585 m (5' 2.4\")   Wt 83.9 kg (185 lb)   BMI 33.40 kg/m         ASSESSMENT AND PLAN:      1. 2 years status laparoscopic gastric sleeve. She has lost 102 lbs (35% TBW)  2. Morbid Obesity current BMI: Body mass index is 33.4 " kg/m .  3. Post surgical malabsorption:   Labs ordered per protocol.   Follow food plan per dietitian recommendations.   Continue taking recommended post-op vitamins.  4. Return to clinic in 1 month with Nayeli CAMPBELL to follow up regarding smoking cessation and see how she is doing on new med Cymbalta. Side effects with bupropion and chantix in the past.  Consider restart topiramate in 1 month at this visit.  5. Consider restart topiramate ramp to 75 at next visit.  Tolerated in the past before and after surgery.  6. B12 sent to pharmacy injection  7. Omeprazole 40mg twice daily  8. Start taking bariatric vitamins, sent to pharmacy  9. Check TSH and follow up with PCP after.  Not taking meds since surgery.    Sincerely,    Renetta Rossi PA-C        Video-Visit Details    Type of service:  Video Visit    Video Start Time: 857  Video End Time: 925    Originating Location (pt. Location): Home    Distant Location (provider location):  [facility] SURGICAL WEIGHT MANAGEMENT     Platform used for Video Visit: [employer]    Renetta Rossi PA-C

## 2020-05-19 NOTE — PATIENT INSTRUCTIONS
Thank you for allowing us the privilege of caring for you. We hope we provided you with the excellent service you deserve.   Please let us know if there is anything else we can do for you so that we can be sure you are completely satisfied with your care experience.    Your visit was with Renetta Rossi PA-C today.    Instructions per today's visit:   Return to clinic in 1 month with Nayeli CAMPBELL to follow up regarding smoking cessation and see how she is doing on new med Cymbalta. Side effects with bupropion and chantix in the past.  Consider restart topiramate in 1 month at this visit.  Consider restart topiramate ramp to 75 at next visit.  Tolerated in the past before and after surgery.  B12 sent to pharmacy injection  Omeprazole 40mg twice daily  Start taking bariatric vitamins, sent to pharmacy  Check TSH and follow up with PCP after.  Not taking meds since surgery.  Follow up in 3 months Renetta Rossi PA-C      Please call our contact center at [phone] to schedule your next appointments.    Meal Replacement Products:    Here is the link to our new e-store where you can purchase our meal replacement products    [email or web address]    The one week starter kit is a great way to sample a variety of products and see what works for you.    If you want more information about the product go to: [employer]    Free Shipping for orders over $75     Benefits of meal replacements products:    Portion and calorie control  Improved nutrition  Structured eating  Simplified food choices  Avoid contact with trigger foods    Interested in working with a health ?  Health coaches work with you to improve your overall health and wellbeing.  They look at the whole person, and may involve discussion of different areas of life, including, but not limited to the four pillars of health (sleep, exercise, nutrition, and stress management). Discuss with your care team  if you would like to start working a health .  Health Coaching-3 Pack: Schedule by calling [phone]    $99 for three health coaching visits    Visits may be done in person or via phone    Coaching is a partnership between the  and the client; Coaches do not prescribe or diagnose    Coaching helps inspire the client to reach his/her personal goals     Bluetooth Scale:    We hope to provide you with high quality telephone and virtual healthcare visits while social distancing for COVID-19 is necessary, as well as in the future when virtual visits may be more convenient for you.     Our technology team made it possible for Bluetooth scales to send weight measurements to our electronic medical record. This allows weights from you weighing at home to securely flow into the medical record, which will improve telephone and virtual visits.   Additionally, studies have shown that adults actually lose more weight when their weights are automatically sent to someone else, and also that this process is not stressful for those adults.    Below is a link for purchasing the scale, with a discount code for our patients. You may call your insurance company to see if they will reimburse you for the cost of the scale, as a piece of durable medical equipment. The scales only go up to a weight of 400 pounds. This is an issue and we are working with the developer on increasing this. We found no scales that go over 400lb that have blue-tooth for connecting to [employer].    Scale to purchase: the Tagasauris  Body  Scale: [email or web address]=aw.ds    Discount Code: We have a discount code for our patients to bring the cost down to $50, the code is:  withmed     Steps to link the scale to [employer] via an Android Phone (you can always disconnect at any point in the future):  1. The order must be placed first before the patient can access Track My  Health within [employer].  2. Download Google Fit deepika from the Google Play Store   a. Log in or register using your Google account   3. Download the MyChart deepika from Google Play Store  a. Select add organization   b. Search for [employer] and select it   c. Log into [employer]  d. Select Track My Health   e. Select the green connect my account button   f. When prompted log into your Google account   g. Select okay to confirm the account   4. Download the [facility] Health Mate deepika from Google Play Store  a. [place] for "[employer]   b. Go to profile   c. Tap google fit under the Apps section  d. Select the option to activate Google Fit integration   e. Select the same Google account   f. Select okay to confirm the account  g.   Steps to link the scale to [facility] via an iPhone (you can always disconnect at any point in the future):  **Note [employer] is not available for download on an iPad**  1. The order must be placed first before the patient can access Track [facility] within [facility].  2. Locate the Health deepika on your iPhone.  a. Set up your Apple Health account as prompted  b. The Sources page will show Apps that communicate with your Health deepika. Once all steps are completed, you should see Health Mate and MyChart listed under the Apps section and your iPhone under the devices section.  i. Select Health Mate  1. Under 'ALLOW  HEALTH MATE  TO WRITE DATA ensure the toggle is on for Weight.  2. This will allow the scale to add your weight to the Apple Health  ii. Select MyChart  1. Under 'ALLOW  [employer]  TO READ DATA ensure the toggle is on for Weight.  2. This allows [facility] to grab the weight from [employer] so your provider can see your weights.  3. Download the MyChart deepika from the Deepika Store   a. Select add organization                                                  b. Search for [place] and select it  c. Log into [facility]  d. Select Track My Health   e. Select the green connect my account button   f. Follow prompts  to link your device to [employer].  4. Download the [facility] health mate deepika in the Deepika Store   a. [place] for [employer]   b. Go to profile   c. [place] under the Apps section  d. If prompted to allow access with the Health Deepika, toggle weight on for read and write access.       For any questions/concerns contact Elizabeth Zimmerman LPN at [phone]     To schedule appointments with our team, please call [phone]     Please call during clinic hours Monday through Friday 8:00a - 4:00p if you have questions or you can contact us via [employer] at anytime. ?    Lab results will be communicated through My Chart or letter (if My Chart not used). Please call the clinic if you have not received communication after 1 week or if you have any questions.?    Fax: [phone]    Thank you,  Medical Weight Management Team

## 2020-08-25 NOTE — PATIENT INSTRUCTIONS
It was a pleasure meeting with you today.     Thank you for allowing us the privilege of caring for you. We hope we provided you with the excellent service you deserve.     Please let us know if there is anything else we can do for you so that we can be sure you are leaving completely satisfied with your care experience.      You saw Dr Finch today.     Instructions per today's visit:     Plan to lose 50 lbs from today's weight of 289 lbs. Goal weight is 249 lbs.     This may help with your symptoms.     Please take Ibuprofen for pain. Take stool softeners to help with constipation and discomfort.    Please call to set up with Medical weight management team to assist with weight loss with medications: [phone]- please ask for non-surgical weight management.     Please see the Plastic surgery team to discuss pulling your abdominal wall.    Please see both the plastic surgery team and Dr. Finch on the same day.       **If you have emergent symptoms, please call the hospital at [phone] and ask for the on-call surgeon, or present to the hospital. **    To schedule appointments with our team, please call [phone] option #1    Please call during clinic hours Monday through Friday 8:00a - 4:00p if you have questions or you can contact us via [facility] at anytime.      Nurses: [phone] Option # 3 for nurse advice line.  Fax: [phone]  Surgery Scheduler: [phone]    Please call the hospital at [phone] to speak with our on call MDs if you have urgent needs after hours, during weekends, or holidays.       n/a

## 2020-12-27 ENCOUNTER — HEALTH MAINTENANCE LETTER (OUTPATIENT)
Age: 40
End: 2020-12-27

## 2021-04-25 ENCOUNTER — HEALTH MAINTENANCE LETTER (OUTPATIENT)
Age: 41
End: 2021-04-25

## 2021-07-02 NOTE — PATIENT INSTRUCTIONS
Assessment:  57 Hess     Goals:  1) Once tolerating soft diet, progress to bariatric regular diet.   2) Consume 60 grams of protein/day.  3) Sip on 48-64 oz of fluids/day- between meals only.  4) Eat slowly (>20 min/meal), chewing foods well (to applesauce-like consistency).  5) Limit portions to 1/2 cup/meal.  6) Take the following supplements:    Multivitamin/minerals: adult dose 2 times daily    Iron: 45-60 mg elemental (18-36 mg if low risk) - may partly or fully be covered in multivitamin     Calcium Citrate containing vitamin D: 500 mg 3 times daily or 600 mg 2 times daily    Vitamin B12: sublingual form of at least 500 mcg daily or injection of 1000 mcg monthly     B-50 Complex once daily     Follow up with RD in two months    Bernie Dalton RD, LD  If you need to schedule or reschedule with a dietitian please call [phone].

## 2021-10-09 ENCOUNTER — HEALTH MAINTENANCE LETTER (OUTPATIENT)
Age: 41
End: 2021-10-09

## 2022-05-21 ENCOUNTER — HEALTH MAINTENANCE LETTER (OUTPATIENT)
Age: 42
End: 2022-05-21

## 2022-09-17 ENCOUNTER — HEALTH MAINTENANCE LETTER (OUTPATIENT)
Age: 42
End: 2022-09-17

## 2023-06-04 ENCOUNTER — HEALTH MAINTENANCE LETTER (OUTPATIENT)
Age: 43
End: 2023-06-04

## 2024-02-24 ENCOUNTER — HEALTH MAINTENANCE LETTER (OUTPATIENT)
Age: 44
End: 2024-02-24

## 2025-07-02 NOTE — PROGRESS NOTES
SUBJECTIVE:  Brianna Brewer is a 37 year old female who presents for pre-op evaluation. Planning for Bariatric surgery.    No CAD risk factors. Bese. Not very active. No cardiac symptoms.    Last August had a prolonged  Labour with second child. Had eclampsia and during emergency C section had a vasovagal incident. Also 10 years prior to that had hypotension post Spinal anesthesia.. Recently had removal of adhesions due to C section without  Problem.  Not on any cardiac meds.    Patient Active Problem List    Diagnosis Date Noted     Morbid obesity (H) 2018     Priority: Medium     Depression 2018     Priority: Medium     Ventral hernia without obstruction or gangrene 2017     Priority: Medium    .  Current Outpatient Prescriptions   Medication Sig     Cholecalciferol (D3-1000) 1000 UNITS CAPS Take 3,000 Units by mouth every morning      FIBER PO Take 5 capsules by mouth every morning     FLUoxetine (PROZAC) 40 MG capsule Take 40 mg by mouth every morning      hydrOXYzine (ATARAX) 50 MG tablet Take  mg by mouth as needed      levothyroxine (SYNTHROID/LEVOTHROID) 75 MCG tablet Take 75 mcg by mouth every morning      oxyCODONE IR (ROXICODONE) 5 MG tablet Take 5 mg by mouth every 8 hours as needed      phentermine 15 MG capsule Take 1 capsule (15 mg) by mouth every morning     topiramate (TOPAMAX) 25 MG tablet Take 2 tablets (50 mg) by mouth 2 times daily     No current facility-administered medications for this visit.      Past Medical History:   Diagnosis Date     Anxiety      Chronic diarrhea 2017    Contracted c-diff during surgery     Depressive disorder 2000     History of blood transfusion 2017    3 units of blood during      Hypertension 2017    During pregnancy pre-eclampsia     Hypothyroidism      Morbid obesity with BMI of 50.0-59.9, adult (H)      Preeclampsia      Vasovagal response 2017     Ventral hernia      Past Surgical History:   Procedure Laterality Date      ABDOMEN SURGERY  2017          SECTION  2017     COLONOSCOPY  2017     No Known Allergies  Social History     Social History     Marital status: Single     Spouse name: N/A     Number of children: N/A     Years of education: N/A     Occupational History     Not on file.     Social History Main Topics     Smoking status: Former Smoker     Packs/day: 1.00     Years: 20.00     Types: Cigarettes     Start date: 1998     Quit date: 2016     Smokeless tobacco: Never Used     Alcohol use Yes     Drug use: No     Sexual activity: Not Currently     Partners: Male     Birth control/ protection: Abstinence     Other Topics Concern     Not on file     Social History Narrative     Family History   Problem Relation Age of Onset     Thyroid Disease Mother      Obesity Mother      Thyroid Disease Maternal Grandmother      Cerebrovascular Disease Father           REVIEW OF SYSTEMS:  General: negative, fever, chills, night sweats  Skin: negative, acne, rash and scaling  Eyes: negative, double vision, eye pain and photophobia  Ears/Nose/Throat: negative, nasal congestion and purulent rhinorrhea  Respiratory: No dyspnea on exertion, No cough, No hemoptysis and negative  Cardiovascular: negative, palpitations, tachycardia, irregular heart beat, chest pain, exertional chest pain or pressure, paroxysmal nocturnal dyspnea, dyspnea on exertion and orthopnea  Gastrointestinal: negative, dysphagia, nausea and vomiting  Genitourinary: negative, nocturia, dysuria and frequency  Musculoskeletal: negative, fracture, back pain, neck pain and arthritis  Neurologic: negative, headaches, syncope, stroke, seizures and paralysis  Psychiatric: negative, nervous breakdown, thoughts of self-harm and thoughts of hurting someone else  Hematologic/Lymphatic/Immunologic: negative, bleeding disorder, chills and fever  Endocrine: negative, cold intolerance, heat intolerance and hot flashes       OBJECTIVE:  Blood pressure  "131/74, pulse 94, height 1.585 m (5' 2.4\"), weight 130.3 kg (287 lb 4.8 oz), last menstrual period 07/10/2018, SpO2 98 %.  General Appearance: alert and no distress  Head: Normocephalic. No masses, lesions, tenderness or abnormalities  Eyes: conjuctiva clear, PERRL, EOM intact  Ears: External ears normal. Canals clear. TM's normal.  Nose: Nares normal  Mouth: normal  Neck: Supple, no cervical adenopathy, no thyromegaly  Lungs: clear to auscultation  Cardiac: regular rate and rhythm, normal S1 and S2, no murmur  Abdomen: Soft, nontender.  Normal bowel sounds.  No hepatosplenomegaly or abnormal masses  Extremities: no peripheral edema, peripheral pulses normal  Musculoskeletal: negative  Neurological: Cranial nerves 2-12 intact, motor strength intact       ASSESSMENT/PLAN:  Here for pre-op evaluation. Planning for Bariatric surgery.  Not very active.  N cardiac symptoms.  No CAD risk factors.  EKG reviewed. NSR. Normal.  Prior events during surgery are isolated events and unlikely to recur.  Considering her age and lack of risk factors,patient do not need further cardiac workup at this time.  She may proceed with planned surgery.  Per orders.   Return to Clinic PRN.  " 8

## (undated) DEVICE — ENDO POUCH UNIVERSAL RETRIEVAL SYSTEM INZII 12/15MM CD004

## (undated) DEVICE — LINEN TOWEL PACK X30 5481

## (undated) DEVICE — SUCTION MANIFOLD DORNOCH ULTRA CART UL-CL500

## (undated) DEVICE — CLIP APPLIER ENDO 5MM M/L LIGAMAX EL5ML

## (undated) DEVICE — STPL POWERED ECHELON LONG 60MM PLEE60A

## (undated) DEVICE — ANTIFOG SOLUTION W/FOAM PAD 31142527

## (undated) DEVICE — NDL INSUFFLATION 13GA 150MM C2202

## (undated) DEVICE — DRSG PRIMAPORE 02X3" 7133

## (undated) DEVICE — ENDO TROCAR SLEEVE KII Z-THREADED 05X100MM CTS02

## (undated) DEVICE — PREP CHLORAPREP 26ML TINTED ORANGE  260815

## (undated) DEVICE — ENDO TROCAR OPTICAL ACCESS KII Z-THRD 15X100MM C0R37

## (undated) DEVICE — ESU GROUND PAD ADULT W/CORD E7507

## (undated) DEVICE — STPL RELOAD REG TISSUE ECHELON 60 X 3.6MM BLUE GST60B

## (undated) DEVICE — ENDO TROCAR FIRST ENTRY KII FIOS Z-THRD 05X150MM CTF01

## (undated) DEVICE — LINEN TOWEL PACK X6 WHITE 5487

## (undated) DEVICE — STPL SKIN 35W ROTATING HEAD PRW35

## (undated) DEVICE — Device

## (undated) DEVICE — ENDO TROCAR FIRST ENTRY KII FIOS Z-THRD 05X100MM CTF03

## (undated) DEVICE — ESU LIGASURE MARYLAND VESSEL LAP 44CM XLONG LF1944

## (undated) RX ORDER — FENTANYL CITRATE 50 UG/ML
INJECTION, SOLUTION INTRAMUSCULAR; INTRAVENOUS
Status: DISPENSED
Start: 2018-08-28

## (undated) RX ORDER — SODIUM CHLORIDE, SODIUM LACTATE, POTASSIUM CHLORIDE, CALCIUM CHLORIDE 600; 310; 30; 20 MG/100ML; MG/100ML; MG/100ML; MG/100ML
INJECTION, SOLUTION INTRAVENOUS
Status: DISPENSED
Start: 2018-08-28

## (undated) RX ORDER — ONDANSETRON 2 MG/ML
INJECTION INTRAMUSCULAR; INTRAVENOUS
Status: DISPENSED
Start: 2018-08-28

## (undated) RX ORDER — PROPOFOL 10 MG/ML
INJECTION, EMULSION INTRAVENOUS
Status: DISPENSED
Start: 2018-08-28

## (undated) RX ORDER — CYANOCOBALAMIN 1000 UG/ML
INJECTION, SOLUTION INTRAMUSCULAR; SUBCUTANEOUS
Status: DISPENSED
Start: 2018-10-04

## (undated) RX ORDER — ACETAMINOPHEN 325 MG/1
TABLET ORAL
Status: DISPENSED
Start: 2018-08-28

## (undated) RX ORDER — MORPHINE SULFATE 2 MG/ML
INJECTION, SOLUTION INTRAMUSCULAR; INTRAVENOUS
Status: DISPENSED
Start: 2018-08-28

## (undated) RX ORDER — HYDROMORPHONE HYDROCHLORIDE 1 MG/ML
INJECTION, SOLUTION INTRAMUSCULAR; INTRAVENOUS; SUBCUTANEOUS
Status: DISPENSED
Start: 2018-08-28

## (undated) RX ORDER — FENTANYL CITRATE 50 UG/ML
INJECTION, SOLUTION INTRAMUSCULAR; INTRAVENOUS
Status: DISPENSED
Start: 2019-05-01

## (undated) RX ORDER — DEXAMETHASONE SODIUM PHOSPHATE 4 MG/ML
INJECTION, SOLUTION INTRA-ARTICULAR; INTRALESIONAL; INTRAMUSCULAR; INTRAVENOUS; SOFT TISSUE
Status: DISPENSED
Start: 2018-08-28